# Patient Record
Sex: MALE | Race: WHITE | NOT HISPANIC OR LATINO | Employment: OTHER | URBAN - METROPOLITAN AREA
[De-identification: names, ages, dates, MRNs, and addresses within clinical notes are randomized per-mention and may not be internally consistent; named-entity substitution may affect disease eponyms.]

---

## 2017-01-31 ENCOUNTER — GENERIC CONVERSION - ENCOUNTER (OUTPATIENT)
Dept: OTHER | Facility: OTHER | Age: 72
End: 2017-01-31

## 2017-02-13 ENCOUNTER — GENERIC CONVERSION - ENCOUNTER (OUTPATIENT)
Dept: OTHER | Facility: OTHER | Age: 72
End: 2017-02-13

## 2017-05-11 ENCOUNTER — ALLSCRIPTS OFFICE VISIT (OUTPATIENT)
Dept: OTHER | Facility: OTHER | Age: 72
End: 2017-05-11

## 2017-06-29 ENCOUNTER — GENERIC CONVERSION - ENCOUNTER (OUTPATIENT)
Dept: OTHER | Facility: OTHER | Age: 72
End: 2017-06-29

## 2017-07-13 ENCOUNTER — GENERIC CONVERSION - ENCOUNTER (OUTPATIENT)
Dept: OTHER | Facility: OTHER | Age: 72
End: 2017-07-13

## 2017-09-13 ENCOUNTER — GENERIC CONVERSION - ENCOUNTER (OUTPATIENT)
Dept: OTHER | Facility: OTHER | Age: 72
End: 2017-09-13

## 2017-10-23 DIAGNOSIS — J45.909 UNCOMPLICATED ASTHMA: ICD-10-CM

## 2017-10-23 DIAGNOSIS — E78.2 MIXED HYPERLIPIDEMIA: ICD-10-CM

## 2017-10-23 DIAGNOSIS — Z00.00 ENCOUNTER FOR GENERAL ADULT MEDICAL EXAMINATION WITHOUT ABNORMAL FINDINGS: ICD-10-CM

## 2017-10-23 DIAGNOSIS — G47.33 OBSTRUCTIVE SLEEP APNEA: ICD-10-CM

## 2017-10-23 DIAGNOSIS — E11.9 TYPE 2 DIABETES MELLITUS WITHOUT COMPLICATIONS (HCC): ICD-10-CM

## 2017-10-23 DIAGNOSIS — K21.9 GASTRO-ESOPHAGEAL REFLUX DISEASE WITHOUT ESOPHAGITIS: ICD-10-CM

## 2017-10-23 DIAGNOSIS — I73.9 PERIPHERAL VASCULAR DISEASE (HCC): ICD-10-CM

## 2017-10-23 DIAGNOSIS — K76.0 FATTY (CHANGE OF) LIVER, NOT ELSEWHERE CLASSIFIED: ICD-10-CM

## 2017-10-23 DIAGNOSIS — I10 ESSENTIAL (PRIMARY) HYPERTENSION: ICD-10-CM

## 2017-10-23 DIAGNOSIS — H26.9 CATARACT: ICD-10-CM

## 2017-10-24 ENCOUNTER — ALLSCRIPTS OFFICE VISIT (OUTPATIENT)
Dept: OTHER | Facility: OTHER | Age: 72
End: 2017-10-24

## 2017-10-25 NOTE — PROGRESS NOTES
Assessment  1  Preoperative clearance (V72 84) (Z01 818)   2  Cataract, left eye (366 9) (H26 9)   3  Diabetes mellitus, type 2 (250 00) (E11 9)   4  Hypertension (401 9) (I10)   5  Obstructive sleep apnea (327 23) (G47 33)   6  Peripheral arterial disease (443 9) (I73 9)   7  Asthma (493 90) (J45 909)   8  Fatty liver (571 8) (K76 0)   9  Gastroesophageal reflux disease (530 81) (K21 9)    Plan  Asthma, Cataract, left eye, Diabetes mellitus, type 2, Fatty liver, Gastroesophageal refluxdisease, Hypertension, Obstructive sleep apnea, Peripheral arterial disease    · (1) CBC/ PLT (NO DIFF); Status:Active; Requested YXN:86GIU8574;    · (1) COMPREHENSIVE METABOLIC PANEL; Status:Active; Requested ZCD:68ZWK3259;    · (1) HEMOGLOBIN A1C; Status:Active; Requested IYV:55LHT7074;    · (1) LIPID PANEL, FASTING; Status:Active; Requested TTZ:18UVV2402;    · (1) TSH; Status:Active; Requested PIM:89ASI9020;   Diabetes mellitus, type 2    · Eat a normal well-balanced diet ; Status:Complete;   Done: 55BWY8754 11:26AM   · Call [phone] if: Your blood sugar is higher than 250 ; Status:Complete;   Done:24Oct2017 11:26AM   · Seek Immediate Medical Attention if: There are signs that the blood sugar is too low(hypoglycemia) ; Status:Complete;   Done: 95AWX3042 11:26AM  Hypertension    · Eat no more than 30 grams of fat per day ; Status:Complete;   Done: 43BNU9586 11:26AM   · We encourage you to begin to make lifestyle changes to help control your bloodpressure    These may include losing weight, increasing your activity level, limiting salt inyour diet, decreasing alcohol intake, and eating a diet low in fat and rich in fruitsand vegetables ; Status:Complete;   Done: 98EUH1625 11:26AM  Need for prophylactic vaccination and inoculation against influenza    · Fluzone High-Dose 0 5 ML Intramuscular Suspension Prefilled Syringe    Discussion/Summary  Surgical Clearance: He is at a LOW risk from a cardiovascular standpoint at this time without any additional cardiac testing  Reevaluation needed, if he should present with symptoms prior to surgery/procedure  Surgical clearance faxed to Dr Ceci Castellanos   Hold metformin on the day of surgery  Patient instructed to take his blood pressure medications on the morning of surgery with sips of water  HD flu vaccine today  repeat labs 11/2017  OV 6 months  History of Present Illness  Pre-Op Visit (Brief): The patient is being seen for a preoperative visit  The procedure is a(n) cataract surgery OS scheduled for 10/31/2017 with Dr Arin Chanel  The indication for surgery is cataract OS  Surgical Risk Assessment:    HPI: 81year old male here for pre operative evaluation  cataract surgery OS  medications reviewed  05/2017 see note  history of resistant hypertension blood pressures stable on current multi drug regimen  creatinine 1 04  electrolytes normal  mixed hyperlipidemia on 2 fish oil capsules a day  lipid profile cholesterol 145  TGs 308 increased from 258  TGs 501 in 2015  HDL 30  LDL 53  type 2 DM on Metformin 500 mg BID  A1c 7 0 urine microalbumin 5 4 decreased from 45 on ARB  no hypoglycemic events  mild neuropathy symptoms numbness no pain  last eye exam > 1 year ago  admission 08/2015 for right-sided chest pain associated with increased shortness of breath and accelerated hypertension  He ruled out for MI  EKG no acute EKG changes  nuclear stress test normal  no perfusion abnormalities  EF 67%  He was seen by nephrology  workup for secondary causes of hypertension metanephrine normal at 47  normetanephrine elevated at 183  renin 19 27  aldosterone 2 2 renal artery dopplers 03/2015 no ISIAH  kidneys hypertrophic  2 8 cm cyst left kidney  Review of Systems   Constitutional: no recent weight gain-- and-- no recent weight loss  Eyes: no eyesight problems  ENT: + nasal congestion  , but-- no nasal discharge  Cardiovascular: intermittent leg claudication-- and-- PAD no rest pain   pain left hip and calf pain after walking long distances  11/2016 arterial dopplers LEs  Diffuse heterogeneous plaque throughout both legs  Evidence of right lower extremity occlusive disease in mid SFA  aortoiliac study normal but limited views of proximal common iliac arteries due to body habitus, but-- no chest pain,-- no palpitations-- and-- no extremity edema  Respiratory: asthma stable on Advair  FIOR on CPAP with auto adjustment feature  , but-- no cough,-- no orthopnea,-- no wheezing,-- no shortness of breath during exertion-- and-- no PND  Gastrointestinal: GERD stable on Omeprazole  no dysphagia  EGD 09/2015  colonoscopy 06/2014  1 colon polyp descending colon, diverticulosis and internal hemorrhoids  history of fatty liver with mildly elevated AST and ALT  05/2017 LFTs normal  05/2016 AST 46  ALT 77 , but-- no abdominal pain,-- no nausea,-- no vomiting,-- no constipation,-- no diarrhea-- and-- no blood in stools  Genitourinary: 05/2017 PSA 1 06, but-- no dysuria,-- no urinary hesitancy,-- no incontinence-- and-- no nocturia  Musculoskeletal: no arthralgias-- and-- no myalgias  Integumentary: s/p removal BCC left forehead  Neurological: no headache-- and-- no dizziness  Active Problems  1  Asthma (493 90) (J45 909)   2  Benign positional vertigo (386 11) (H81 10)   3  Degenerative joint disease of hand (715 94) (M19 049)   4  Diabetes mellitus, type 2 (250 00) (E11 9)   5  Eczema (692 9) (L30 9)   6  Fatty liver (571 8) (K76 0)   7  Gastroesophageal reflux disease (530 81) (K21 9)   8  Hypertension (401 9) (I10)   9  Lower back pain (724 2) (M54 5)   10  Lumbar radiculopathy (724 4) (M54 16)   11  Mixed hyperlipidemia (272 2) (E78 2)   12  Obstructive sleep apnea (327 23) (G47 33)   13   Peripheral arterial disease (443 9) (I73 9)    Past Medical History   · History of Accelerated essential hypertension (401 0) (I10)   · History of Diverticulosis (562 10) (K57 90)   · History of chest pain (V13 89) (C51 963)   · History of prostatitis (V13 89) (L15 669)    Surgical History   · History of Cardiovascular Stress Test    Family History  Father    · Family history of Emphysema lung   · Family history of Stroke    Social History   · Former smoker (E10 53) (Z43 834)    Current Meds   1  Advair Diskus 250-50 MCG/DOSE Inhalation Aerosol Powder Breath Activated; USE 1 INHALATION ORALLY    TWICE DAILY; Therapy: 63ZCD5117 to (Evaluate:77Opo4051)  Requested for: 57YRS4990; Last Rx:14Feb2017 Ordered   2  AmLODIPine Besylate 10 MG Oral Tablet; TAKE 1 TABLET DAILY  Requested for: 45Dps2474; Last Rx:14Aug2017 Ordered   3  Aspirin 81 MG TABS; Take 1 tablet daily Recorded   4  Carvedilol 25 MG Oral Tablet; TAKE 1 TABLET TWICE DAILY  Requested for: 01Aug2017; Last Rx:01Aug2017 Ordered   5  Chlorthalidone 25 MG Oral Tablet; TAKE 1 AND 1/2 TABLETS DAILY; Therapy: 11UFY6342 to (Last Rx:85Grz3278)  Requested for: 50LCA6989 Ordered   6  Fish Oil 1000 MG Oral Capsule; TAKE 1 CAPSULE Daily Recorded   7  Folic Acid CAPS; TAKE 1 CAPSULE Daily Recorded   8  Hydrocortisone-Acetic Acid 1-2 % Otic Solution; INSTILL 3 DROPS IN BOTH 4 TIMES DAILY AS NEED; Therapy: 06GNM7080 to (Last Rx:16Jan2017)  Requested for: 02RQK6332 Ordered   9  MetFORMIN HCl - 500 MG Oral Tablet; one tablet twice a day; Therapy: 97CLP1832 to (Evaluate:53Tol6693)  Requested for: 27Ike6609; Last Rx:05Gnw1648 Ordered   10  Omeprazole 40 MG Oral Capsule Delayed Release; Take 1 capsule twice daily  Recorded   11  Systane Ultra SOLN;  Therapy: (Recorded:62Hyl9386) to Recorded   12  Valsartan 320 MG Oral Tablet; TAKE 1 TABLET ONCE DAILY; Therapy: 70Mwi4566 to (Evaluate:68Kbp0323)  Requested for: 01Aug2017; Last  Rx:01Aug2017 Ordered   13  Vitamin B-12 1000 MCG Oral Tablet; Take 1 tablet daily as directed Recorded   14  Vitamin D3 1000 UNIT Oral Tablet; Take as directed Recorded    Allergies  1   No Known Drug Allergies    Vitals   Recorded: 44EFZ8997 10:54AM   Temperature 98 F, Tympanic Heart Rate 54   Respiration 16   Systolic 473, LUE, Sitting   Diastolic 62, LUE, Sitting   Height 5 ft 8 in   Weight 239 lb 4 oz   BMI Calculated 36 38   BSA Calculated 2 21     Physical Exam   Constitutional  General appearance: No acute distress, well appearing and well nourished  Eyes  Conjunctiva and lids: No erythema, swelling or discharge  -- fundi not seen  Ears, Nose, Mouth, and Throat  Otoscopic examination: Tympanic membranes translucent with normal light reflex  Canals patent without erythema  Oropharynx: Normal with no erythema, edema, exudate or lesions  Neck  Neck: Supple, symmetric, trachea midline, no masses  Thyroid: Normal, no thyromegaly  Pulmonary  Auscultation of lungs: Clear to auscultation  no rales or crackles were heard bilaterally  no wheezing  no diminished breath sounds  Cardiovascular  Auscultation of heart: Normal rate and rhythm, normal S1 and S2, no murmurs  Heart sounds: no gallop heard  No pericardial rub  Carotid pulses: 2+ bilaterally  no bruit heard over the right carotid-- and-- no bruit heard over the left carotid  Abdominal aorta: Normal   Abdominal aorta: no bruit heard  Examination of extremities for edema and/or varicosities: Normal    Chest  Chest: Normal   Chest: no tenderness  Abdomen  Abdomen: Non-tender, no masses  Liver and spleen: No hepatomegaly or splenomegaly  Lymphatic  Palpation of lymph nodes in neck: No lymphadenopathy  Musculoskeletal  Inspection/palpation of digits and nails: Normal without clubbing or cyanosis  Skin  Skin and subcutaneous tissue: Normal without rashes or lesions  Psychiatric  Mood and affect: Normal        Results/Data  (1) LYME ANTIBODY PROFILE W/REFLEX TO WESTERN BLOT 22ORM3366 12:00AM Mohinder Aguilar     Test Name Result Flag Reference   LYME IGG 0 38     LYME IGM 0 30       Summary / No summary entered :    No summary entered  Documents attached :    [address] Work - Mohinder Aguilar;  Enc: [id number] - Image Encounter - Mevelyn Faden -    (Family Medicine) (Additional Information Document)  (1) HEMOGLOBIN A1C 53LHO6376 12:00AM Cabreraelyn Faden     Test Name Result Flag Reference   HEMOGLOBIN A1C 7 0     EST  AVG  GLUCOSE 154       Summary / No summary entered :    No summary entered  Documents attached :    [address] Work - Margarita Bosch; Enc: [id number] - Image Encounter - Margarita Roween -    (Family Medicine) (Additional Information Document)  (1) CBC/PLT/DIFF 20NDO0187 12:00AM Mevelyn Faden     Test Name Result Flag Reference   WBC COUNT 7 6     RBC COUNT 5 06     HEMOGLOBIN 14 0     HEMATOCRIT 41 8     MCV 83     MCH 27 7     MCHC 33 6     RDW 13 9     MPV 10 6     PLATELET COUNT 619     NEUTROPHILS RELATIVE PERCENT 54     LYMPHOCYTES RELATIVE PERCENT 34     MONOCYTES RELATIVE PERCENT 5     EOSINOPHILS RELATIVE PERCENT 6     BASOPHILS RELATIVE PERCENT 1     NEUTROPHILS ABSOLUTE COUNT 4 1     LYMPHOCYTES ABSOLUTE COUNT 2 6     MONOCYTES ABSOLUTE COUNT 0 4     EOSINOPHILS ABSOLUTE COUNT 0 4     BASOPHILS ABSOLUTE COUNT 0 1       Summary / No summary entered :    No summary entered  Documents attached :    [address] Work - Margarita Bosch; Enc: [id number] - Image Encounter - Mevelyn Faden -    (Family Medicine) (Additional Information Document)  (1) COMPREHENSIVE METABOLIC PANEL 78RGW8677 50:12YU Cabreraelyn Jaeen     Test Name Result Flag Reference   SODIUM 141     POTASSIUM 4 4     CHLORIDE 101     CARBON DIOXIDE 29     ANION GAP (CALC) 11     BLOOD UREA NITROGEN 22     CREATININE 1 04     CALCIUM 9 5     BILI, TOTAL 0 4     ALK PHOSPHATAS 62     AST(SGOT) 36     ALBUMIN 3 9     TOTAL PROTEIN 7 2     GLUCOSE FASTING 159       Summary / No summary entered :    No summary entered  Documents attached :    [address] Work - Margarita Bosch;  Enc: [id number] - Image Encounter - Mevelyn Jaeen -    (Family Medicine) (Additional Information Document)  (1) LIPID PANEL, FASTING 47EAP7427 12:00AM Mevelyn Faden     Test Name Result Flag Reference CHOLESTEROL 145     HDL,DIRECT 30     LDL CHOLESTEROL CALCULATED 53     TRIGLYCERIDES 308     TCHOL/HDL RATIO 4 83       Summary / No summary entered :    No summary entered  Documents attached :    [address] Work - Donte Stewart; Enc: [id number] - Image Encounter - Donte Jeronimof -    (Family Medicine) (Additional Information Document)  (1) MICROALBUMIN CREATININE RATIO, RANDOM URINE 61EIU9927 12:00AM Donte Stewart     Test Name Result Flag Reference   MICROALBUMIN,URINE 5 4     CREATININE URINE 108 0     MICROALBUMIN/ CREAT RATIO 50       Summary / No summary entered :    No summary entered  Documents attached :    [address] Work - Donte Stewart; Enc: [id number] - Image Encounter - Donte Stewart -    [facility]) (Additional Information Document)  (1) PSA (SCREEN) (Dx V76 44 Screen for Prostate Cancer) 76GHC7516 12:00AM Donte Stewart     Test Name Result Flag Reference   PROSTATE SPECIFIC ANTIGEN 1 06       Summary / No summary entered :    No summary entered  Documents attached :    [address] Work - Donte Stewart; Enc: [id number] - Image Encounter - Donte Jeronimof -    (Family Medicine) (Additional Information Document)  VAS ABDOMINAL AORTA/ILIACS; COMPLETE STUDY 97TZT2960 08:35AM Tanya Tellez Order Number: [id number]   - Patient Instructions: To schedule this appointment, please contact Central Scheduling at [phone]  Test Name Result Flag Reference   VAS ABDOMINAL AORTA/ILIACS; COMPLETE STUDY (Report)     [facility] REPORT  CLINICAL:  Indications: Claudication [I70 219]  Indications: PVD, Unspecified [I73 9]  Pt exp pain left side of abdomen  (like a stitch) which radiated across groin to medial side of left leg while  walking  Symptoms began 2000 but subsided when he stopped smoking  However,  symptoms returned a few years ago and he is now exp  the same discomfort in the  right leg  Risk Factors: The patient has history of COPD and Diabetes (NIDDM (oral  meds))   The patient current BMI is 33, Height (in) is 70 in and Weight (lb) is  230 lb  Small hiatal hernia  Sleep apnea  Clinical  Right Pressure: 155/ mm Hg, Left Pressure: 157/ mm Hg  FINDINGS:    Unilateral    Impression   PSV (cm/s) EDV (cm/s) AP (cm) TRV (cm)   Sup-Amy Ao                95     15   1 5    1 7   Jux Renal Aorta             101      0   1 5    2 0   Ds Inf-Mason Ao              208      0   1 5        Celiac      Not visualized                         SMA Ostial    Not visualized                         Prox  SMA    Not visualized                           Right      Impression   PSV (cm/s) EDV (cm/s) AP (cm)   Prox CHRISTINA                177      0        Dist CHRISTNIA                198      0        Internal Iliac             198      0   0 6   Prox  EIA               217      0   0 5   Dist EIA                148      0   0 6   Prox Renal   Not Visualized                      Left      Impression   PSV (cm/s) EDV (cm/s) AP (cm)   Prox CHRISTINA                182      0   0 9   Dist CHRISTINA                          0 7   Internal Iliac             167      0   0 7   Prox  EIA               148      0   0 6   Dist EIA                106         0 8   Prox Renal   Not Visualized                          CONCLUSION:  Impression  Segments of the aorta and iliac arteries that were visualized appear to be of  normal caliber and patent  The proximal common Iliac arteries were not visualized due to patient body  habitus, over-lying bowel gas, and large abdominal hernia  SIGNATURE:  Electronically Signed by: Leticia Parra MD, RPVI on 2016-12-01 05:36:56 PM     VAS LOWER LIMB ARTERIAL DUPLEX, COMPLETE BILATERAL/GRAFTS [id number] 08:35AM [facility] Order Number: [id number]   - Patient Instructions: To schedule this appointment, please contact Central Scheduling at [phone]       Test Name Result Flag Reference   VAS LOWER LIMB ARTERIAL DUPLEX, COMPLETE BILATERAL/GRAFTS (Report)     [facility] REPORT  CLINICAL:  Indications: PVD, Unspecified [I73 9]  Pt exp pain left side of abdomen  (like a stitch) which radiated across groin to medial side of left leg while  walking  Symptoms began 2000 but subsided when he stopped smoking  However  returned a few years ago and he is now exp  the same discomfort in the right  leg  Risk Factors: The patient has history of COPD and Diabetes (NIDDM (oral meds)  The patient current BMI is 33, Height (in) is 70 in and Weight (lb) is 230 lb  Small hiatal hernia  Sleep apnea    Right Brachial Pressure: 155/ mmHg, Left Brachial Pressure: 157/ mmHg  FINDINGS:    Segment        Right         Left                       Impression PSV EDV PSV EDV   Common Femoral Artery       177  0 203  0   Prox Profunda           212  0 171  0   Prox SFA              147  0 122  0   Mid SFA        <50%    187  0  95  0   Dist SFA              123  0  73  0   Proximal Pop            78  0  71  0   Distal Pop             87  0  64  0   Dist Post Tibial          81  5  76  0   Dist  Ant  Tibial          79  0  39  0         CONCLUSION:  Impression:  RIGHT LOWER LIMB:  This resting evaluation shows evidence of lower extremity arterial occlusive  disease in the mid SFA  There is diffuse heterogeneous plaque throughout the  lower extremity  Ankle/Brachial index:  1 15, normal  Metatarsal pressure 173mmHg  Great toe pressure of  50 mmHg, within the healing range    LEFT LOWER LIMB:  This resting evaluation shows no evidence of significant lower extremity  arterial occlusive disease  There is diffuse heterogeneous plaque throughout the  lower extremity  Ankle/Brachial index:  1 07, normal  Metatarsal pressure 133mmHg  Great toe pressure of  79 mmHg, within the healing range    There is no previous studies      SIGNATURE:  Electronically Signed by: Kathy Jain MD, RPVI on 2016-12-01 05:37:15 PM     Education  Education Items with no Session   Fluzone High-Dose 0 5 ML Intramuscular Suspension Prefilled Syringe; QRHJPHWM:25TXL8847 11:20AM; Counselor: Ronnie Penny; End of Encounter Meds  1  Advair Diskus 250-50 MCG/DOSE Inhalation Aerosol Powder Breath Activated; USE 1 INHALATION ORALLY    TWICE DAILY; Therapy: 41LOT4778 to (Evaluate:64Zjl7828)  Requested for: 95LNY6006; Last Rx:17Mpx4026 Ordered  2  Vitamin D3 1000 UNIT Oral Tablet; Take as directed Recorded  3  MetFORMIN HCl - 500 MG Oral Tablet; one tablet twice a day; Therapy: 19JZV6171 to (Evaluate:50Mxa3364)  Requested for: 85Rsk8297; Last Rx:62Fvg2255 Ordered  4  Hydrocortisone-Acetic Acid 1-2 % Otic Solution; INSTILL 3 DROPS IN BOTH 4 TIMES DAILY AS NEED; Therapy: 88VQE6181 to (Last Rx:16Jan2017)  Requested for: 37VTE8451 Ordered  5  Omeprazole 40 MG Oral Capsule Delayed Release; Take 1 capsule twice daily Recorded  6  Aspirin 81 MG TABS; Take 1 tablet daily Recorded   7  Fish Oil 1000 MG Oral Capsule; TAKE 1 CAPSULE Daily Recorded   8  Folic Acid CAPS; TAKE 1 CAPSULE Daily Recorded   9  Vitamin B-12 1000 MCG Oral Tablet; Take 1 tablet daily as directed Recorded  10  AmLODIPine Besylate 10 MG Oral Tablet; TAKE 1 TABLET DAILY  Requested for:  05Wqi1118; Last Rx:23Qup1741 Ordered   11  Carvedilol 25 MG Oral Tablet; TAKE 1 TABLET TWICE DAILY  Requested for:  38Pfj8939; Last Rx:38Cwr0516 Ordered   12  Chlorthalidone 25 MG Oral Tablet; TAKE 1 AND 1/2 TABLETS DAILY; Therapy: 05ZMY6148 to (Last Rx:25Asq3910)  Requested for: 81BNK5970 Ordered   13  Valsartan 320 MG Oral Tablet; TAKE 1 TABLET ONCE DAILY; Therapy: 94Cid7959 to (Evaluate:72Fhb7075)  Requested for: 80Jgc0597; Last  Rx:64Weg0649 Ordered  14  Systane Ultra SOLN;  Therapy: (Recorded:58Yok9181) to Recorded    Future Appointments    Date/Time Provider Specialty Site   04/30/2018 08:00 AM VARGAS Benedict   Family Medicine [address]     Signatures   Electronically signed by : VARGAS Park ; Oct 24 2017 11:29AM EST                       (Author)

## 2018-01-10 DIAGNOSIS — Z12.5 ENCOUNTER FOR SCREENING FOR MALIGNANT NEOPLASM OF PROSTATE: ICD-10-CM

## 2018-01-10 DIAGNOSIS — E11.9 TYPE 2 DIABETES MELLITUS WITHOUT COMPLICATIONS (HCC): ICD-10-CM

## 2018-01-10 DIAGNOSIS — K76.0 FATTY (CHANGE OF) LIVER, NOT ELSEWHERE CLASSIFIED: ICD-10-CM

## 2018-01-10 DIAGNOSIS — I10 ESSENTIAL (PRIMARY) HYPERTENSION: ICD-10-CM

## 2018-01-10 DIAGNOSIS — Z00.00 ENCOUNTER FOR GENERAL ADULT MEDICAL EXAMINATION WITHOUT ABNORMAL FINDINGS: ICD-10-CM

## 2018-01-10 DIAGNOSIS — G47.33 OBSTRUCTIVE SLEEP APNEA: ICD-10-CM

## 2018-01-10 DIAGNOSIS — K21.9 GASTRO-ESOPHAGEAL REFLUX DISEASE WITHOUT ESOPHAGITIS: ICD-10-CM

## 2018-01-10 DIAGNOSIS — I73.9 PERIPHERAL VASCULAR DISEASE (HCC): ICD-10-CM

## 2018-01-10 DIAGNOSIS — E78.2 MIXED HYPERLIPIDEMIA: ICD-10-CM

## 2018-01-12 VITALS
SYSTOLIC BLOOD PRESSURE: 134 MMHG | DIASTOLIC BLOOD PRESSURE: 62 MMHG | HEIGHT: 68 IN | RESPIRATION RATE: 16 BRPM | TEMPERATURE: 96.6 F | HEART RATE: 68 BPM | WEIGHT: 237 LBS | BODY MASS INDEX: 35.92 KG/M2

## 2018-01-13 VITALS
RESPIRATION RATE: 16 BRPM | HEIGHT: 68 IN | DIASTOLIC BLOOD PRESSURE: 62 MMHG | WEIGHT: 239.25 LBS | BODY MASS INDEX: 36.26 KG/M2 | HEART RATE: 54 BPM | SYSTOLIC BLOOD PRESSURE: 136 MMHG | TEMPERATURE: 98 F

## 2018-01-18 NOTE — CONSULTS
History of Present Illness  Pre-Op Visit (Brief): The patient is being seen for a preoperative visit  The procedure is a(n) cataract surgery OS scheduled for 10/31/2017 with Dr Gianluca Pozo  The indication for surgery is cataract OS  Surgical Risk Assessment:       HPI: 81year old male here for pre operative evaluation  cataract surgery OS  medications reviewed  05/2017 see note  history of resistant hypertension blood pressures stable on current multi drug regimen  creatinine 1 04  electrolytes normal  mixed hyperlipidemia on 2 fish oil capsules a day  lipid profile cholesterol 145  TGs 308 increased from 258  TGs 501 in 2015  HDL 30  LDL 53  type 2 DM on Metformin 500 mg BID  A1c 7 0 urine microalbumin 5 4 decreased from 45 on ARB  no hypoglycemic events  mild neuropathy symptoms numbness no pain  last eye exam > 1 year ago  admission 08/2015 for right-sided chest pain associated with increased shortness of breath and accelerated hypertension  He ruled out for MI  EKG no acute EKG changes  nuclear stress test normal  no perfusion abnormalities  EF 67%  He was seen by nephrology  workup for secondary causes of hypertension metanephrine normal at 47  normetanephrine elevated at 183  renin 19 27  aldosterone 2 2 renal artery dopplers 03/2015 no ISIAH  kidneys hypertrophic  2 8 cm cyst left kidney  Review of Systems    Constitutional: no recent weight gain and no recent weight loss  Eyes: no eyesight problems  ENT: + nasal congestion  , but no nasal discharge  Cardiovascular: intermittent leg claudication and PAD no rest pain  pain left hip and calf pain after walking long distances  11/2016 arterial dopplers LEs  Diffuse heterogeneous plaque throughout both legs  Evidence of right lower extremity occlusive disease in mid SFA  aortoiliac study normal but limited views of proximal common iliac arteries due to body habitus, but no chest pain, no palpitations and no extremity edema     Respiratory: asthma stable on Advair  FIOR on CPAP with auto adjustment feature  , but no cough, no orthopnea, no wheezing, no shortness of breath during exertion and no PND  Gastrointestinal: GERD stable on Omeprazole  no dysphagia  EGD 09/2015  colonoscopy 06/2014  1 colon polyp descending colon, diverticulosis and internal hemorrhoids  history of fatty liver with mildly elevated AST and ALT  05/2017 LFTs normal  05/2016 AST 46  ALT 77 , but no abdominal pain, no nausea, no vomiting, no constipation, no diarrhea and no blood in stools  Genitourinary: 05/2017 PSA 1 06, but no dysuria, no urinary hesitancy, no incontinence and no nocturia  Musculoskeletal: no arthralgias and no myalgias  Integumentary: s/p removal BCC left forehead  Neurological: no headache and no dizziness  Active Problems    1  Asthma (493 90) (J45 909)   2  Benign positional vertigo (386 11) (H81 10)   3  Degenerative joint disease of hand (715 94) (M19 049)   4  Diabetes mellitus, type 2 (250 00) (E11 9)   5  Eczema (692 9) (L30 9)   6  Fatty liver (571 8) (K76 0)   7  Gastroesophageal reflux disease (530 81) (K21 9)   8  Hypertension (401 9) (I10)   9  Lower back pain (724 2) (M54 5)   10  Lumbar radiculopathy (724 4) (M54 16)   11  Mixed hyperlipidemia (272 2) (E78 2)   12  Obstructive sleep apnea (327 23) (G47 33)   13  Peripheral arterial disease (443 9) (I73 9)    Past Medical History    · History of Accelerated essential hypertension (401 0) (I10)   · History of Diverticulosis (562 10) (K57 90)   · History of chest pain (V13 89) (G92 555)   · History of prostatitis (V13 89) (R24 554)    Surgical History    · History of Cardiovascular Stress Test    Family History    · Family history of Emphysema lung   · Family history of Stroke    Social History    · Former smoker (Q55 17) (P37 377)    Current Meds   1  Advair Diskus 250-50 MCG/DOSE Inhalation Aerosol Powder Breath Activated; USE 1   INHALATION ORALLY    TWICE DAILY;    Therapy: 03IBA7072 to (22 016140)  Requested for: 24XQI8474; Last   Rx:92Wlu5298 Ordered   2  AmLODIPine Besylate 10 MG Oral Tablet; TAKE 1 TABLET DAILY  Requested for:   54Yzv6282; Last Rx:75Pxx7029 Ordered   3  Aspirin 81 MG TABS; Take 1 tablet daily Recorded   4  Carvedilol 25 MG Oral Tablet; TAKE 1 TABLET TWICE DAILY  Requested for: 84Fkp5901;   Last Rx:01Aug2017 Ordered   5  Chlorthalidone 25 MG Oral Tablet; TAKE 1 AND 1/2 TABLETS DAILY; Therapy: 07GOZ0199 to (Last Rx:34Cmq0368)  Requested for: 72HBS3511 Ordered   6  Fish Oil 1000 MG Oral Capsule; TAKE 1 CAPSULE Daily Recorded   7  Folic Acid CAPS; TAKE 1 CAPSULE Daily Recorded   8  Hydrocortisone-Acetic Acid 1-2 % Otic Solution; INSTILL 3 DROPS IN BOTH 4 TIMES   DAILY AS NEED; Therapy: 16TQW7660 to (Last Rx:16Jan2017)  Requested for: 85JRV5130 Ordered   9  MetFORMIN HCl - 500 MG Oral Tablet; one tablet twice a day; Therapy: 13ZPT2252 to (Evaluate:57Aww7300)  Requested for: 10Akg2863; Last   Rx:61Vjc0407 Ordered   10  Omeprazole 40 MG Oral Capsule Delayed Release; Take 1 capsule twice daily    Recorded   11  Systane Ultra SOLN;    Therapy: (Recorded:10Aug2015) to Recorded   12  Valsartan 320 MG Oral Tablet; TAKE 1 TABLET ONCE DAILY; Therapy: 51Gtk8424 to (Evaluate:54Bvn1917)  Requested for: 09Eos4553; Last    Rx:01Aug2017 Ordered   13  Vitamin B-12 1000 MCG Oral Tablet; Take 1 tablet daily as directed Recorded   14  Vitamin D3 1000 UNIT Oral Tablet; Take as directed Recorded    Allergies    1  No Known Drug Allergies    Vitals  Signs    Temperature: 98 F, Tympanic  Heart Rate: 54  Respiration: 16  Systolic: 407, LUE, Sitting  Diastolic: 62, LUE, Sitting  Height: 5 ft 8 in  Weight: 239 lb 4 oz  BMI Calculated: 36 38  BSA Calculated: 2 21    Physical Exam    Constitutional   General appearance: No acute distress, well appearing and well nourished  Eyes   Conjunctiva and lids: No erythema, swelling or discharge  fundi not seen     Ears, Nose, Mouth, and Throat Otoscopic examination: Tympanic membranes translucent with normal light reflex  Canals patent without erythema  Oropharynx: Normal with no erythema, edema, exudate or lesions  Neck   Neck: Supple, symmetric, trachea midline, no masses  Thyroid: Normal, no thyromegaly  Pulmonary   Auscultation of lungs: Clear to auscultation  no rales or crackles were heard bilaterally  no wheezing  no diminished breath sounds  Cardiovascular   Auscultation of heart: Normal rate and rhythm, normal S1 and S2, no murmurs  Heart sounds: no gallop heard  No pericardial rub  Carotid pulses: 2+ bilaterally  no bruit heard over the right carotid and no bruit heard over the left carotid  Abdominal aorta: Normal   Abdominal aorta: no bruit heard  Examination of extremities for edema and/or varicosities: Normal     Chest   Chest: Normal   Chest: no tenderness  Abdomen   Abdomen: Non-tender, no masses  Liver and spleen: No hepatomegaly or splenomegaly  Lymphatic   Palpation of lymph nodes in neck: No lymphadenopathy  Musculoskeletal   Inspection/palpation of digits and nails: Normal without clubbing or cyanosis  Skin   Skin and subcutaneous tissue: Normal without rashes or lesions  Psychiatric   Mood and affect: Normal        Results/Data  (1) LYME ANTIBODY PROFILE W/REFLEX TO WESTERN BLOT 49BJH8095 12:00AM [facility]     Test Name Result Flag Reference   LYME IGG 0 38     LYME IGM 0 30       Summary / No summary entered :      No summary entered  Documents attached :      [address] Work - [employer]; Enc: [id number] - Image Encounter - Jeison Hernandez -      (Family Medicine) (Additional Information Document)  (1) HEMOGLOBIN A1C 92VZS2832 12:00AM [facility]     Test Name Result Flag Reference   HEMOGLOBIN A1C 7 0     EST  AVG  GLUCOSE 154       Summary / No summary entered :      No summary entered  Documents attached :      [address] Work - [employer];  Enc: [id number] - Image Encounter - [place] [facility], Tila Jerome -      (Family Medicine) (Additional Information Document)  (1) CBC/PLT/DIFF 04KJY6061 12:00AM [facility]     Test Name Result Flag Reference   WBC COUNT 7 6     RBC COUNT 5 06     HEMOGLOBIN 14 0     HEMATOCRIT 41 8     MCV 83     MCH 27 7     MCHC 33 6     RDW 13 9     MPV 10 6     PLATELET COUNT 214     NEUTROPHILS RELATIVE PERCENT 54     LYMPHOCYTES RELATIVE PERCENT 34     MONOCYTES RELATIVE PERCENT 5     EOSINOPHILS RELATIVE PERCENT 6     BASOPHILS RELATIVE PERCENT 1     NEUTROPHILS ABSOLUTE COUNT 4 1     LYMPHOCYTES ABSOLUTE COUNT 2 6     MONOCYTES ABSOLUTE COUNT 0 4     EOSINOPHILS ABSOLUTE COUNT 0 4     BASOPHILS ABSOLUTE COUNT 0 1       Summary / No summary entered :      No summary entered  Documents attached :      [address] Work - [facility]; Enc: [id number] - Image Encounter - [facility] -      (Family Medicine) (Additional Information Document)  (1) COMPREHENSIVE METABOLIC PANEL 53RPN3101 07:47XH [facility]     Test Name Result Flag Reference   SODIUM 141     POTASSIUM 4 4     CHLORIDE 101     CARBON DIOXIDE 29     ANION GAP (CALC) 11     BLOOD UREA NITROGEN 22     CREATININE 1 04     CALCIUM 9 5     BILI, TOTAL 0 4     ALK PHOSPHATAS 62     AST(SGOT) 36     ALBUMIN 3 9     TOTAL PROTEIN 7 2     GLUCOSE FASTING 159       Summary / No summary entered :      No summary entered  Documents attached :      [address] Work - [facility]; Enc: [id number] - Image Encounter - [facility] -      (Family Medicine) (Additional Information Document)  (1) LIPID PANEL, FASTING 15TUB9476 12:00AM [facility]     Test Name Result Flag Reference   CHOLESTEROL 145     HDL,DIRECT 30     LDL CHOLESTEROL CALCULATED 53     TRIGLYCERIDES 308     TCHOL/HDL RATIO 4 83       Summary / No summary entered :      No summary entered  Documents attached :      [address] Work - [facility];  Enc: [id number] - Image Encounter - [facility] -      [facility]) (Additional Information Document)  (1) MICROALBUMIN CREATININE RATIO, RANDOM URINE 22PBY9003 12:00AM [facility]     Test Name Result Flag Reference   MICROALBUMIN,URINE 5 4     CREATININE URINE 108 0     MICROALBUMIN/ CREAT RATIO 50       Summary / No summary entered :      No summary entered  Documents attached :      [address] Work - [facility]; Enc: [id number] - Image Encounter - [facility] -      [facility]) (Additional Information Document)  (1) PSA (SCREEN) (Dx V76 44 Screen for Prostate Cancer) 35PUC3468 12:00AM [facility]     Test Name Result Flag Reference   PROSTATE SPECIFIC ANTIGEN 1 06       Summary / No summary entered :      No summary entered  Documents attached :      [address] Work - [employer]; Enc: [id number] - Image Encounter - [facility] -      (Family Medicine) (Additional Information Document)  VAS ABDOMINAL AORTA/ILIACS; COMPLETE STUDY 22CVV0758 08:35AM [facility] Order Number: [id number]    - Patient Instructions: To schedule this appointment, please contact Central Scheduling at [phone]  Test Name Result Flag Reference   VAS ABDOMINAL AORTA/ILIACS; COMPLETE STUDY (Report)     [facility] REPORT   CLINICAL:   Indications: Claudication [I70 219]  Indications: PVD, Unspecified [I73 9]  Pt exp pain left side of abdomen   (like a stitch) which radiated across groin to medial side of left leg while   walking  Symptoms began 2000 but subsided when he stopped smoking  However,   symptoms returned a few years ago and he is now exp  the same discomfort in the   right leg  Risk Factors: The patient has history of COPD and Diabetes (NIDDM (oral   meds))  The patient current BMI is 33, Height (in) is 70 in and Weight (lb) is   230 lb  Small hiatal hernia  Sleep apnea  Clinical   Right Pressure: 155/ mm Hg, Left Pressure: 157/ mm Hg        FINDINGS:      Unilateral    Impression   PSV (cm/s) EDV (cm/s) AP (cm) TRV (cm)    Sup-Amy Ao                95     15   1 5    1 7    Jux Renal Aorta             101 0   1 5    2 0    Ds Inf-Mason Ao              208      0   1 5         Celiac      Not visualized                          SMA Ostial    Not visualized                          Prox  SMA    Not visualized                             Right      Impression   PSV (cm/s) EDV (cm/s) AP (cm)    Prox CHRISTINA                177      0         Dist CHRISTINA                198      0         Internal Iliac             198      0   0 6    Prox  EIA               217      0   0 5    Dist EIA                148      0   0 6    Prox Renal   Not Visualized                        Left      Impression   PSV (cm/s) EDV (cm/s) AP (cm)    Prox CHRISTINA                182      0   0 9    Dist CHRISTINA                          0 7    Internal Iliac             167      0   0 7    Prox  EIA               148      0   0 6    Dist EIA                106         0 8    Prox Renal   Not Visualized                              CONCLUSION:   Impression   Segments of the aorta and iliac arteries that were visualized appear to be of   normal caliber and patent  The proximal common Iliac arteries were not visualized due to patient body   habitus, over-lying bowel gas, and large abdominal hernia  SIGNATURE:   Electronically Signed by: Nandini Xiong MD, RPVI on 2016-12-01 05:36:56 PM     VAS LOWER LIMB ARTERIAL DUPLEX, COMPLETE BILATERAL/GRAFTS [id number] 08:35AM [facility] Number: [id number]    - Patient Instructions: To schedule this appointment, please contact Central Scheduling at [phone]  Test Name Result Flag Reference   VAS LOWER LIMB ARTERIAL DUPLEX, COMPLETE BILATERAL/GRAFTS (Report)     [facility] REPORT   CLINICAL:   Indications: PVD, Unspecified [I73 9]  Pt exp pain left side of abdomen   (like a stitch) which radiated across groin to medial side of left leg while   walking  Symptoms began 2000 but subsided when he stopped smoking  However   returned a few years ago and he is now exp   the same discomfort in the right   leg  Risk Factors: The patient has history of COPD and Diabetes (NIDDM (oral meds)  The patient current BMI is 33, Height (in) is 70 in and Weight (lb) is 230 lb  Small hiatal hernia  Sleep apnea      Right Brachial Pressure: 155/ mmHg, Left Brachial Pressure: 157/ mmHg  FINDINGS:      Segment        Right         Left                        Impression PSV EDV PSV EDV    Common Femoral Artery       177  0 203  0    Prox Profunda           212  0 171  0    Prox SFA              147  0 122  0    Mid SFA        <50%    187  0  95  0    Dist SFA              123  0  73  0    Proximal Pop            78  0  71  0    Distal Pop             87  0  64  0    Dist Post Tibial          81  5  76  0    Dist  Ant  Tibial          79  0  39  0             CONCLUSION:   Impression:   RIGHT LOWER LIMB:   This resting evaluation shows evidence of lower extremity arterial occlusive   disease in the mid SFA  There is diffuse heterogeneous plaque throughout the   lower extremity  Ankle/Brachial index:  1 15, normal   Metatarsal pressure 173mmHg  Great toe pressure of  50 mmHg, within the healing range      LEFT LOWER LIMB:   This resting evaluation shows no evidence of significant lower extremity   arterial occlusive disease  There is diffuse heterogeneous plaque throughout the   lower extremity  Ankle/Brachial index:  1 07, normal   Metatarsal pressure 133mmHg   Great toe pressure of  79 mmHg, within the healing range      There is no previous studies  SIGNATURE:   Electronically Signed by: Leticia Parra MD, RPVI on 2016-12-01 05:37:15 PM     Assessment    1  Preoperative clearance (V72 84) (Z01 818)   2  Cataract, left eye (366 9) (H26 9)   3  Diabetes mellitus, type 2 (250 00) (E11 9)   4  Hypertension (401 9) (I10)   5  Obstructive sleep apnea (327 23) (G47 33)   6  Peripheral arterial disease (443 9) (I73 9)   7  Asthma (493 90) (J45 909)   8  Fatty liver (571 8) (K76 0)   9   Gastroesophageal reflux disease (530 81) (K21 9)    Plan  Asthma, Cataract, left eye, Diabetes mellitus, type 2, Fatty liver, Gastroesophageal reflux  disease, Hypertension, Obstructive sleep apnea, Peripheral arterial disease    · (1) CBC/ PLT (NO DIFF); Status:Active; Requested GYF:13KYV2643;    · (1) COMPREHENSIVE METABOLIC PANEL; Status:Active; Requested YED:75RWG2516;    · (1) HEMOGLOBIN A1C; Status:Active; Requested AIU:47WYW2268;    · (1) LIPID PANEL, FASTING; Status:Active; Requested AKS:53MPS7836;    · (1) TSH; Status:Active; Requested QQZ:94QKF7571;   Diabetes mellitus, type 2    · Eat a normal well-balanced diet ; Status:Complete;   Done: 64KDO8677 11:26AM   · Call [phone] if: Your blood sugar is higher than 250 ; Status:Complete;   Done:  03YJG7375 11:26AM   · Seek Immediate Medical Attention if: There are signs that the blood sugar is too low  (hypoglycemia) ; Status:Complete;   Done: 74NOX1818 11:26AM  Hypertension    · Eat no more than 30 grams of fat per day ; Status:Complete;   Done: 20PXP0449 11:26AM   · We encourage you to begin to make lifestyle changes to help control your blood  pressure  These may include losing weight, increasing your activity level, limiting salt in  your diet, decreasing alcohol intake, and eating a diet low in fat and rich in fruits  and vegetables ; Status:Complete;   Done: 66YNZ8011 11:26AM  Need for prophylactic vaccination and inoculation against influenza    · Fluzone High-Dose 0 5 ML Intramuscular Suspension Prefilled Syringe    Discussion/Summary  Surgical Clearance: He is at a LOW risk from a cardiovascular standpoint at this time without any additional cardiac testing  Reevaluation needed, if he should present with symptoms prior to surgery/procedure  Surgical clearance faxed to Dr Timothy Zuniga   Hold metformin on the day of surgery  Patient instructed to take his blood pressure medications on the morning of surgery with sips of water  HD flu vaccine today   repeat labs 11/2017  OV 6 months  Education  Education Items with no Session   Fluzone High-Dose 0 5 ML Intramuscular Suspension Prefilled Syringe;  Provided:  05DJE8341 11:20AM; Counselor: Sravan Bowers; End of Encounter Meds    1  Advair Diskus 250-50 MCG/DOSE Inhalation Aerosol Powder Breath Activated; USE 1   INHALATION ORALLY    TWICE DAILY; Therapy: 07MVL4243 to (Evaluate:65Nww5572)  Requested for: 72FCT9338; Last   Rx:27Ruv4959 Ordered    2  Vitamin D3 1000 UNIT Oral Tablet; Take as directed Recorded    3  MetFORMIN HCl - 500 MG Oral Tablet; one tablet twice a day; Therapy: 41MBL8893 to (Evaluate:89Suw5255)  Requested for: 30Vwd3235; Last   Rx:85Mza3789 Ordered    4  Hydrocortisone-Acetic Acid 1-2 % Otic Solution; INSTILL 3 DROPS IN BOTH 4 TIMES   DAILY AS NEED; Therapy: 36AOM5968 to (Last Rx:16Jan2017)  Requested for: 45GAV0254 Ordered    5  Omeprazole 40 MG Oral Capsule Delayed Release; Take 1 capsule twice daily   Recorded    6  Aspirin 81 MG TABS; Take 1 tablet daily Recorded   7  Fish Oil 1000 MG Oral Capsule; TAKE 1 CAPSULE Daily Recorded   8  Folic Acid CAPS; TAKE 1 CAPSULE Daily Recorded   9  Vitamin B-12 1000 MCG Oral Tablet; Take 1 tablet daily as directed Recorded    10  AmLODIPine Besylate 10 MG Oral Tablet; TAKE 1 TABLET DAILY  Requested for:    17Dij8536; Last Rx:81Lqj4193 Ordered   11  Carvedilol 25 MG Oral Tablet; TAKE 1 TABLET TWICE DAILY  Requested for: 04Ijq8263;    Last Rx:93Xfg8829 Ordered   12  Chlorthalidone 25 MG Oral Tablet; TAKE 1 AND 1/2 TABLETS DAILY; Therapy: 05SMT2504 to (Last Rx:99Kjm1081)  Requested for: 82DNW4070 Ordered   13  Valsartan 320 MG Oral Tablet; TAKE 1 TABLET ONCE DAILY; Therapy: 64Zlg3424 to (Evaluate:64Cyn7717)  Requested for: 67Efn6913; Last    Rx:42Ctc0792 Ordered    14   Systane Ultra SOLN;    Therapy: (Recorded:10Aug2015) to Recorded    Signatures   Electronically signed by : VARGAS Sanders ; Oct 24 2017 11:29AM EST (Author)

## 2018-01-25 ENCOUNTER — TRANSCRIBE ORDERS (OUTPATIENT)
Dept: ADMINISTRATIVE | Facility: HOSPITAL | Age: 73
End: 2018-01-25

## 2018-01-25 DIAGNOSIS — R10.9 STOMACH PAIN: Primary | ICD-10-CM

## 2018-01-31 ENCOUNTER — HOSPITAL ENCOUNTER (OUTPATIENT)
Dept: RADIOLOGY | Facility: MEDICAL CENTER | Age: 73
Discharge: HOME | End: 2018-01-31
Payer: COMMERCIAL

## 2018-01-31 DIAGNOSIS — R10.9 STOMACH PAIN: ICD-10-CM

## 2018-01-31 PROCEDURE — 76705 ECHO EXAM OF ABDOMEN: CPT

## 2018-03-09 ENCOUNTER — TELEPHONE (OUTPATIENT)
Dept: FAMILY MEDICINE CLINIC | Facility: CLINIC | Age: 73
End: 2018-03-09

## 2018-03-09 NOTE — TELEPHONE ENCOUNTER
Patient is requesting refill Amlodipine 10 mg 1 pill daily #90 90 day supply with refills   SWATHI Echavarria

## 2018-03-10 DIAGNOSIS — I10 ESSENTIAL HYPERTENSION: Primary | ICD-10-CM

## 2018-03-10 RX ORDER — AMLODIPINE BESYLATE 10 MG/1
10 TABLET ORAL DAILY
Qty: 90 TABLET | Refills: 3 | Status: SHIPPED | OUTPATIENT
Start: 2018-03-10 | End: 2019-02-15 | Stop reason: SDUPTHER

## 2018-03-10 RX ORDER — AMLODIPINE BESYLATE 10 MG/1
1 TABLET ORAL DAILY
COMMUNITY
End: 2018-03-10 | Stop reason: SDUPTHER

## 2018-04-30 LAB
HBA1C MFR BLD HPLC: 6.5 %
MICROALBUM.,U,RANDOM (HISTORICAL): 1.8 MG/L
MICROALBUMIN/CREATININE RATIO (HISTORICAL): 17 MG/G CREATININE

## 2018-05-07 ENCOUNTER — OFFICE VISIT (OUTPATIENT)
Dept: FAMILY MEDICINE CLINIC | Facility: CLINIC | Age: 73
End: 2018-05-07
Payer: COMMERCIAL

## 2018-05-07 VITALS
TEMPERATURE: 97.9 F | HEART RATE: 64 BPM | HEIGHT: 68 IN | BODY MASS INDEX: 33.92 KG/M2 | SYSTOLIC BLOOD PRESSURE: 140 MMHG | DIASTOLIC BLOOD PRESSURE: 68 MMHG | WEIGHT: 223.8 LBS

## 2018-05-07 DIAGNOSIS — J45.40 MODERATE PERSISTENT ASTHMA WITHOUT COMPLICATION: ICD-10-CM

## 2018-05-07 DIAGNOSIS — Z00.00 MEDICARE ANNUAL WELLNESS VISIT, SUBSEQUENT: Primary | ICD-10-CM

## 2018-05-07 DIAGNOSIS — I10 ESSENTIAL HYPERTENSION: ICD-10-CM

## 2018-05-07 DIAGNOSIS — E11.9 TYPE 2 DIABETES MELLITUS WITHOUT COMPLICATION, WITHOUT LONG-TERM CURRENT USE OF INSULIN (HCC): ICD-10-CM

## 2018-05-07 DIAGNOSIS — E78.2 MIXED HYPERLIPIDEMIA: ICD-10-CM

## 2018-05-07 DIAGNOSIS — J34.2 DEVIATED NASAL SEPTUM: ICD-10-CM

## 2018-05-07 PROBLEM — H26.9 CATARACT, LEFT EYE: Status: RESOLVED | Noted: 2017-10-24 | Resolved: 2018-05-07

## 2018-05-07 PROBLEM — K22.70 BARRETT'S ESOPHAGUS WITHOUT DYSPLASIA: Status: ACTIVE | Noted: 2017-06-05

## 2018-05-07 PROBLEM — K63.5 POLYP OF COLON: Status: RESOLVED | Noted: 2017-06-05 | Resolved: 2018-05-07

## 2018-05-07 PROBLEM — H26.9 CATARACT, LEFT EYE: Status: ACTIVE | Noted: 2017-10-24

## 2018-05-07 PROBLEM — K63.5 POLYP OF COLON: Status: ACTIVE | Noted: 2017-06-05

## 2018-05-07 PROCEDURE — G0439 PPPS, SUBSEQ VISIT: HCPCS | Performed by: FAMILY MEDICINE

## 2018-05-07 PROCEDURE — 1101F PT FALLS ASSESS-DOCD LE1/YR: CPT | Performed by: FAMILY MEDICINE

## 2018-05-07 PROCEDURE — 3725F SCREEN DEPRESSION PERFORMED: CPT | Performed by: FAMILY MEDICINE

## 2018-05-07 PROCEDURE — 99214 OFFICE O/P EST MOD 30 MIN: CPT | Performed by: FAMILY MEDICINE

## 2018-05-07 RX ORDER — FOLIC ACID 20 MG
1 CAPSULE ORAL DAILY
COMMUNITY

## 2018-05-07 RX ORDER — CHLORTHALIDONE 25 MG/1
37.5 TABLET ORAL DAILY
Qty: 135 TABLET | Refills: 3 | Status: SHIPPED | OUTPATIENT
Start: 2018-05-07 | End: 2019-03-25 | Stop reason: SDUPTHER

## 2018-05-07 RX ORDER — CARVEDILOL 25 MG/1
1 TABLET ORAL 2 TIMES DAILY
COMMUNITY
End: 2018-07-10 | Stop reason: SDUPTHER

## 2018-05-07 RX ORDER — EAR PLUGS
1 EACH OTIC (EAR) DAILY
COMMUNITY

## 2018-05-07 RX ORDER — CHLORAL HYDRATE 500 MG
1 CAPSULE ORAL DAILY
COMMUNITY

## 2018-05-07 RX ORDER — VALSARTAN 320 MG/1
1 TABLET ORAL DAILY
COMMUNITY
Start: 2015-08-24 | End: 2018-06-02 | Stop reason: SDUPTHER

## 2018-05-07 RX ORDER — OMEPRAZOLE 40 MG/1
1 CAPSULE, DELAYED RELEASE ORAL 2 TIMES DAILY
COMMUNITY

## 2018-05-07 RX ORDER — CHLORTHALIDONE 25 MG/1
1.5 TABLET ORAL DAILY
COMMUNITY
Start: 2016-02-26 | End: 2018-05-07 | Stop reason: SDUPTHER

## 2018-05-07 RX ORDER — ASPIRIN 81 MG/1
81 TABLET ORAL DAILY
COMMUNITY

## 2018-05-07 NOTE — PROGRESS NOTES
Assessment/Plan:     Diagnoses and all orders for this visit:    Medicare annual wellness visit, subsequent    Essential hypertension  -     chlorthalidone 25 mg tablet; Take 1 5 tablets (37 5 mg total) by mouth daily for 90 days  -     CBC and differential  -     Comprehensive metabolic panel    Type 2 diabetes mellitus without complication, without long-term current use of insulin ([facility])  -     HEMOGLOBIN A1C W/ EAG ESTIMATION    Mixed hyperlipidemia  -     Lipid panel    Moderate persistent asthma without complication    Deviated nasal septum    Other orders  -     fluticasone-salmeterol (ADVAIR DISKUS) 250-50 mcg/dose inhaler; Inhale 1 puff 2 (two) times a day  -     aspirin (ECOTRIN LOW STRENGTH) 81 mg EC tablet; Take 81 mg by mouth daily  -     Omega-3 Fatty Acids (FISH OIL) 1,000 mg; Take 1 capsule by mouth daily  -     fluticasone-salmeterol (ADVAIR) 250-50 mcg/dose inhaler; Inhale 1 puff 2 (two) times a day  -     carvedilol (COREG) 25 mg tablet; Take 1 tablet by mouth 2 (two) times a day  -     Folic Acid 20 MG CAPS; Take 1 capsule by mouth daily  -     metFORMIN (GLUCOPHAGE) 500 mg tablet; Take 1 tablet by mouth 2 (two) times a day  -     omeprazole (PriLOSEC) 40 MG capsule; Take 1 capsule by mouth 2 (two) times a day  -     polyethylene glycol-propylene glycol (SYSTANE ULTRA) 0 4-0 3 %; Apply to eye  -     valsartan (DIOVAN) 320 MG tablet; Take 1 tablet by mouth daily  -     Cyanocobalamin (VITAMIN B-12) 1000 MCG/15ML LIQD; Take 1 tablet by mouth daily  -     Cholecalciferol (VITAMIN D3) 1000 UNIT/SPRAY LIQD; Take by mouth         continue with current medications  I suggested over-the-counter Flonase daily for chronic nasal symptoms  Repeat labs prior to next visit in 6 months  Diet weight loss and exercise  I recommended the shingles vaccine  Yearly flu vaccine  Up-to-date with pneumococcal vaccines  Patient ID: Edilberto Wiggins  is a 72 y o  male  Follow up visit  medications reviewed  history of resistant hypertension blood pressures stable on current multi drug regimen  04/2018 creatinine 1 02  electrolytes normal  Hgb 14  3   mixed hyperlipidemia on 2 fish oil capsules a day  lipid profile cholesterol 161  TGs 300  (TGs 501 in 2015)  HDL 32  LDL 69  LFTs normal  TSH 1 99  type 2 DM on Metformin 500 mg BID  04/2018 A1c 6 5 urine micro albumin 1 8  on ARB  no hypoglycemic events  mild neuropathy symptoms numbness no pain  last eye exam 09/2017  admission 08/2015 for right-sided chest pain associated with increased shortness of breath and accelerated hypertension  He ruled out for MI  EKG no acute EKG changes  nuclear stress test normal  no perfusion abnormalities  EF 67%  He was seen by nephrology  workup for secondary causes of hypertension metanephrine normal at 47  normetanephrine elevated at 183  renin 19 27  aldosterone 2 2 renal artery dopplers 03/2015 no ISIAH  kidneys hypertrophic  2 8 cm cyst left kidney  The following portions of the patient's history were reviewed and updated as appropriate: allergies, current medications, past family history, past medical history, past social history, past surgical history and problem list     Review of Systems   Constitutional: Negative for activity change, appetite change, chills, fever and unexpected weight change  HENT: Negative for congestion, ear pain, postnasal drip, rhinorrhea, sinus pain, sore throat, trouble swallowing and voice change  + chronic nasal congestion  Eyes: Negative for visual disturbance  S/p cataract surgery OS   Respiratory: Negative for cough, shortness of breath and wheezing  Asthma stable on Advair  FIOR on CPAP with auto adjustment feature  Cardiovascular: Negative for chest pain, palpitations and leg swelling  intermittent leg claudication and PAD no rest pain  pain left hip and calf pain after walking long distances  11/2016 arterial dopplers LEs  Diffuse heterogeneous plaque throughout both legs  Evidence of right lower extremity occlusive disease in mid SFA  aortoiliac study normal but limited views of proximal common iliac arteries due to body habitus   Gastrointestinal: Negative for abdominal pain, blood in stool, constipation, diarrhea, nausea and vomiting  GERD stable on Omeprazole  no dysphagia  EGD 09/2015  colonoscopy 06/2014  1 colon polyp descending colon, diverticulosis and internal hemorrhoids  history of fatty liver 04/2018 LFTs normal   01/2018 abdominal ultrasound normal except for mild diffuse fatty infiltration of liver   Genitourinary: Negative for difficulty urinating and urgency  04/2018 PSA 1 34   Musculoskeletal: Negative for arthralgias and myalgias  Skin: Negative for rash  Allergic/Immunologic: Negative for environmental allergies  Neurological: Negative for dizziness, weakness, light-headedness and headaches  Hematological: Negative for adenopathy  Does not bruise/bleed easily  Psychiatric/Behavioral: Negative for dysphoric mood and sleep disturbance  Objective:      /68 (BP Location: Left arm, Patient Position: Sitting, Cuff Size: Large)   Pulse 64   Temp 97 9 °F (36 6 °C)   Ht 5' 7 5" (1 715 m)   Wt 102 kg (223 lb 12 8 oz)   BMI 34 53 kg/m²          Physical Exam   Constitutional: He is oriented to person, place, and time  He appears well-developed and well-nourished  No distress  HENT:   Right Ear: Tympanic membrane normal    Left Ear: Tympanic membrane normal    Mouth/Throat: Oropharynx is clear and moist    + deviated nasal septum    Eyes: Conjunctivae and EOM are normal  Pupils are equal, round, and reactive to light  No scleral icterus  Fundi not seen    Neck: Neck supple  No JVD present  Carotid bruit is not present  No tracheal deviation present  No thyroid mass and no thyromegaly present  Cardiovascular: Normal rate, regular rhythm and normal heart sounds  Exam reveals no gallop    Pulses are weak pulses  No murmur heard  Pulses:       Carotid pulses are 2+ on the right side, and 2+ on the left side  Dorsalis pedis pulses are 1+ on the right side, and 1+ on the left side  Posterior tibial pulses are 1+ on the right side, and 1+ on the left side  Pulmonary/Chest: Effort normal and breath sounds normal  No respiratory distress  He has no wheezes  He has no rales  Abdominal: Soft  Bowel sounds are normal  He exhibits no distension and no mass  There is no hepatosplenomegaly  There is no tenderness  There is no rebound and no guarding  Musculoskeletal: Normal range of motion  He exhibits edema (trace ankle edema  )  Feet:   Right Foot:   Skin Integrity: Negative for ulcer, skin breakdown, erythema, warmth, callus or dry skin  Left Foot:   Skin Integrity: Negative for ulcer, skin breakdown, erythema, warmth, callus or dry skin  Lymphadenopathy:     He has no cervical adenopathy  Neurological: He is alert and oriented to person, place, and time  No cranial nerve deficit  Skin: No rash noted  Psychiatric: He has a normal mood and affect  His behavior is normal    Nursing note and vitals reviewed  Patient's shoes and socks removed  Right Foot/Ankle   Right Foot Inspection  Skin Exam: skin normal and skin intact no dry skin, no warmth, no callus, no erythema, no maceration, no abnormal color, no pre-ulcer, no ulcer and no callus                          Toe Exam: ROM and strength within normal limits  Sensory       Monofilament testing: intact  Vascular  Capillary refills: < 3 seconds  The right DP pulse is 1+  The right PT pulse is 1+       Left Foot/Ankle  Left Foot Inspection  Skin Exam: skin normal and skin intactno dry skin, no warmth, no erythema, no maceration, normal color, no pre-ulcer, no ulcer and no callus                         Toe Exam: ROM and strength within normal limits                   Sensory       Monofilament: intact  Vascular  Capillary refills: < 3 seconds  The left DP pulse is 1+  The left PT pulse is 1+  Assign Risk Category:  No deformity present; No loss of protective sensation; Weak pulses       Risk: 1      Recent Results (from the past 4032 hour(s))   HEMOGLOBIN A1C W/ EAG ESTIMATION    Collection Time: 04/27/18 12:00 AM   Result Value Ref Range    EXT Hemoglobin A1C 6 5    MICROALBUMIN / CREATININE URINE RATIO (HISTORICAL)    Collection Time: 04/27/18 12:00 AM   Result Value Ref Range    MICROALBUM ,U,RANDOM 1 8 mg/L    MICROALBUMIN/CREATININE RATIO 17 mg/g creatinine     AWV Clinical     ISAR:   Previous hospitalizations?:  Yes       Once in a Lifetime Medicare Screening:   EKG performed?:  Yes    AAA screening performed? (if performed, please add date to Health Maintenance): Yes    Results:  11/2016 no AAA       Medicare Screening Tests and Risk Assessment:   AAA Risk Assessment     Tobacco use (males only):   Yes   Age over 72 (males only):  Yes    Osteoporosis Risk Assessment    :  No    Age over 48:  No    HIV Risk Assessment    None indicated:  Yes        Drug and Alcohol Use:   Tobacco use    Cigarettes:  former smoker    Tobacco use duration    Tobacco Cessation Readiness    Alcohol use    Alcohol use:  rare use    Alcohol Treatment Readiness   Illicit Drug Use    Drug use:  never        Diet & Exercise:   Diet   What is your diet?:  Regular   How many servings a day of the following:   Exercise    Do you currently exercise?:  yes    Frequency:  rarely    Type of exercise:  walking       Cognitive Impairment Screening:   Depression screening preformed:  Yes    Geriatric Depression scale score:  5    Depression screening results:  mild to moderate symptoms   Cognitive Impairment Screening    Do you have difficulty learning or retaining new information?:  No Do you have difficulty handling new tasks?:  No   Do you have difficulty with reasoning?:  No Do you have difficulty with spatial ability and orientation?:  No   Do you have difficulty with language?:  No Do you have difficulty with behavior?:  No       Functional Ability/Level of Safety:   Hearing    Hearing difficulties:  No Bilateral:  normal   Left:  normal Right:  normal   Hearing Impairment Assessment    Hearing status:  No impairment   Do your family members ever complain that you turn on the radio or T V  too loudly?:  No Do you find that other people have to repeat themselves when talking to you?:  No   Do you have difficulty hearing while talking on the phone?:  No Has anyone ever told you that you are speaking too loudly when talking with them?:  No   Do you have trouble hearing the doorbell or phone ringing?:  No Do you have difficulty hearing such that you feel frustrated talking to people?:  No   Do you feel sad because you cannot hear well?:  No Do you feel inconvienced due to your hearing problem?:  No   Do you think you would be a happier person if you could hear better?:  No Would you be willing to go for a hearing aid fitting if suggested?:  No   Current Activities    Status:  unlimited ADL's, unlimited driving, unlimited IADL's, unlimited social activities   Help needed with the folllowing:    Using the phone:  No Transportation:  No   Shopping:  No Preparing Meals:  No   Doing Housework:  No Doing Laundry:  No   Managing Medications:  No Managing Money:  No   ADL    Feeding:  Independant   Oral hygiene and Facial grooming:  Independant   Bathing:  Independant   Upper Body Dressing:  Independant   Lower Body Dressing:  Independant   Toileting:  Independant   Bed Mobility:  Independant   Fall Risk   Have you fallen in the last 12 months?:  No Are you unsteady on your feet?:  No    Are you taking any medications that may cause fatigue or dizziness?:  No   Do you have any chronic conditions that may contribute to a fall?:  Diabetes Do you rush to the bathroom potentially risking a fall?:  No   Injury History       Home Safety:   Are there hazards in your environment?: No   If you fell, would you need help to get back up from the ground?:  No Do you have problems or concerns getting in/out of a bed, chair, tub, or toilet?:  No   Do you feel unsteady when walking?:  No Is your activity limited by pain?:  No   Do you have handrails and grab-bars in the home?:  Yes Are emergency numbers kept by the phone and regularly updated?:  Yes   Are you and/or family members aware of the dangers of smoking in bed?:  Yes    Do you have working smoke alarms and fire extinguisher?:  Yes    Have you left the stove on unsupervised?:  No    Home Safety Risk Factors   Unfamilar with surroundings:  No Uneven floors:  No   Stairs or handrail saftey risk:  No Loose rugs:  No   Household clutter:  No Poor household lighting:  No   No grab bars in bathroom:  No Further evaluation needed:  No       Advanced Directives:   Advanced Directives    Living Will:  No Durable POA for healthcare:  No   Advanced directive:  No    Patient's End of Life Decisions        Urinary Incontinence:   Do you have urinary incontinence?:  No        Glaucoma:           Provider Screening     Preventative Screening/Counseling:   Cardiovascular Screening/Counseling:   (Labs Q5 years, EKG optional one-time)   General:  Screening Not Indicated Counseling:  Healthy Diet, Healthy Weight          Diabetes Screening/Counseling:   (2 tests/year if Pre-Diabetes or 1 test/year if no Diabetes)   General:  Screening Current           Colorectal Cancer Screening/Counseling:   (FOBT Q1 yr; Flex Sig Q4 yrs or Q10 yrs after Screening Colonoscopy; Screening Colonoscpy Q2 yrs High Risk or Q10 yrs Low Risk; Barium Enema Q2 yrs High Risk or Q4 yrs Low Risk)   General:  Screening Current           Prostate Cancer Screening/Counseling:   (Annual)    General:  Screening Current          Breast Cancer Screening/Counseling:   (Baseline Age 28 - 43;  Annual Age 36+)         Cervical Cancer Screening/Counseling:   (Annual for High Risk or Childbearing Age with Abnormal Pap in Last 3 yrs; Every 2 all others)         Osteoporosis Screening/Counseling:   (Every 2 Yrs if at risk or more if medically necessary)   General:  Screening Not Indicated           AAA Screening/Counseling:   (Once per Lifetime with risk factors)     Age over 72 (males only):  Yes Tobacco use (males only):  Yes   General:  Screening Current           Glaucoma Screening/Counseling:   (Annual)   General:  Screening Current          HIV Screening/Counseling:   (Voluntary; Once annually for high risk OR 3 times for Pregnancy at diagnosis of IUP; 3rd trimester; and at Labor   General:  Screening Not Indicated           Hepatitis C Screening:             Immunizations:   Influenza (annual):   Influenza UTD This Year   Pneumococcal (Once in a Lifetime):  Lifetime Vaccine Completed   Zostavax (Medicare D Coverage, Pt >72 yo):  Risks & Benefits Discussed       Other Preventative Couseling (Non-Medicare Wellness Visit Required):   nutrition counseling performed, weight reduction was discussed, Increased physical activity counseling given       Referrals (Non-Medicare Wellness Visit Required):       Medical Equipment/Suppliers:             Health Maintenance   Topic Date Due    INFLUENZA VACCINE  09/01/2018    GLAUCOMA SCREENING 67+ YR  09/13/2018    OPHTHALMOLOGY EXAM  09/13/2018    HEMOGLOBIN A1C  10/27/2018    URINE MICROALBUMIN  04/27/2019    Fall Risk  05/07/2019    Depression Screening PHQ-9  05/07/2019    Diabetic Foot Exam  05/07/2019    DTaP,Tdap,and Td Vaccines (2 - Td) 05/07/2028    COLONOSCOPY  05/07/2028    Hepatitis C Screening  Addressed    ABDOMINAL AORTIC ANEURYSM (AAA) SCREEN  Completed    PNEUMOCOCCAL POLYSACCHARIDE VACCINE AGE 72 AND OVER  Completed     Patient Care Team:  Randolph Flores MD as PCP - Sole Robert MD

## 2018-06-02 DIAGNOSIS — I10 ESSENTIAL HYPERTENSION: Primary | ICD-10-CM

## 2018-06-03 PROCEDURE — 4010F ACE/ARB THERAPY RXD/TAKEN: CPT | Performed by: FAMILY MEDICINE

## 2018-06-03 RX ORDER — VALSARTAN 320 MG/1
TABLET ORAL
Qty: 90 TABLET | Refills: 3 | Status: SHIPPED | OUTPATIENT
Start: 2018-06-03 | End: 2019-03-25 | Stop reason: SDUPTHER

## 2018-06-28 DIAGNOSIS — L30.9 DERMATITIS: Primary | ICD-10-CM

## 2018-06-28 RX ORDER — TRIAMCINOLONE ACETONIDE 1 MG/G
CREAM TOPICAL 2 TIMES DAILY
Qty: 80 G | Refills: 3 | Status: SHIPPED | OUTPATIENT
Start: 2018-06-28 | End: 2021-07-19 | Stop reason: SDUPTHER

## 2018-07-10 DIAGNOSIS — I10 ESSENTIAL HYPERTENSION: Primary | ICD-10-CM

## 2018-07-10 RX ORDER — CARVEDILOL 25 MG/1
25 TABLET ORAL 2 TIMES DAILY
Qty: 180 TABLET | Refills: 3 | Status: SHIPPED | OUTPATIENT
Start: 2018-07-10 | End: 2019-06-14 | Stop reason: SDUPTHER

## 2018-07-18 DIAGNOSIS — J44.9 CHRONIC OBSTRUCTIVE PULMONARY DISEASE, UNSPECIFIED COPD TYPE (HCC): Primary | ICD-10-CM

## 2018-08-30 LAB
LEFT EYE DIABETIC RETINOPATHY: NORMAL
RIGHT EYE DIABETIC RETINOPATHY: NORMAL

## 2018-08-30 PROCEDURE — 3072F LOW RISK FOR RETINOPATHY: CPT | Performed by: FAMILY MEDICINE

## 2018-11-12 LAB — HBA1C MFR BLD HPLC: 6.8 %

## 2018-11-19 ENCOUNTER — OFFICE VISIT (OUTPATIENT)
Dept: FAMILY MEDICINE CLINIC | Facility: CLINIC | Age: 73
End: 2018-11-19
Payer: COMMERCIAL

## 2018-11-19 VITALS
DIASTOLIC BLOOD PRESSURE: 58 MMHG | RESPIRATION RATE: 16 BRPM | BODY MASS INDEX: 35.46 KG/M2 | WEIGHT: 234 LBS | TEMPERATURE: 97.4 F | SYSTOLIC BLOOD PRESSURE: 122 MMHG | HEART RATE: 52 BPM | HEIGHT: 68 IN

## 2018-11-19 DIAGNOSIS — G47.33 OBSTRUCTIVE SLEEP APNEA: ICD-10-CM

## 2018-11-19 DIAGNOSIS — I10 ESSENTIAL HYPERTENSION: ICD-10-CM

## 2018-11-19 DIAGNOSIS — K22.70 BARRETT'S ESOPHAGUS WITHOUT DYSPLASIA: ICD-10-CM

## 2018-11-19 DIAGNOSIS — J45.40 MODERATE PERSISTENT ASTHMA WITHOUT COMPLICATION: ICD-10-CM

## 2018-11-19 DIAGNOSIS — K76.0 FATTY LIVER: ICD-10-CM

## 2018-11-19 DIAGNOSIS — I73.9 PERIPHERAL ARTERIAL DISEASE (HCC): ICD-10-CM

## 2018-11-19 DIAGNOSIS — Z23 NEED FOR IMMUNIZATION AGAINST INFLUENZA: ICD-10-CM

## 2018-11-19 DIAGNOSIS — E66.01 CLASS 2 SEVERE OBESITY WITH SERIOUS COMORBIDITY AND BODY MASS INDEX (BMI) OF 36.0 TO 36.9 IN ADULT, UNSPECIFIED OBESITY TYPE (HCC): ICD-10-CM

## 2018-11-19 DIAGNOSIS — E11.42 TYPE 2 DIABETES MELLITUS WITH DIABETIC POLYNEUROPATHY, WITHOUT LONG-TERM CURRENT USE OF INSULIN (HCC): Primary | ICD-10-CM

## 2018-11-19 DIAGNOSIS — K21.9 GASTROESOPHAGEAL REFLUX DISEASE WITHOUT ESOPHAGITIS: ICD-10-CM

## 2018-11-19 DIAGNOSIS — E78.2 MIXED HYPERLIPIDEMIA: ICD-10-CM

## 2018-11-19 DIAGNOSIS — Z12.5 SCREENING FOR PROSTATE CANCER: ICD-10-CM

## 2018-11-19 PROCEDURE — G0008 ADMIN INFLUENZA VIRUS VAC: HCPCS

## 2018-11-19 PROCEDURE — 3078F DIAST BP <80 MM HG: CPT | Performed by: FAMILY MEDICINE

## 2018-11-19 PROCEDURE — 1036F TOBACCO NON-USER: CPT | Performed by: FAMILY MEDICINE

## 2018-11-19 PROCEDURE — 4040F PNEUMOC VAC/ADMIN/RCVD: CPT

## 2018-11-19 PROCEDURE — 3008F BODY MASS INDEX DOCD: CPT | Performed by: FAMILY MEDICINE

## 2018-11-19 PROCEDURE — 90662 IIV NO PRSV INCREASED AG IM: CPT

## 2018-11-19 PROCEDURE — 99214 OFFICE O/P EST MOD 30 MIN: CPT | Performed by: FAMILY MEDICINE

## 2018-11-19 PROCEDURE — 1160F RVW MEDS BY RX/DR IN RCRD: CPT

## 2018-11-19 PROCEDURE — 3074F SYST BP LT 130 MM HG: CPT | Performed by: FAMILY MEDICINE

## 2018-11-19 PROCEDURE — 1160F RVW MEDS BY RX/DR IN RCRD: CPT | Performed by: FAMILY MEDICINE

## 2018-11-19 NOTE — PROGRESS NOTES
Assessment/Plan:         Diagnoses and all orders for this visit:    Type 2 diabetes mellitus with diabetic polyneuropathy, without long-term current use of insulin (HCC)  -     Hemoglobin A1C  -     Microalbumin / creatinine urine ratio    Class 2 severe obesity with serious comorbidity and body mass index (BMI) of 36 0 to 36 9 in adult, unspecified obesity type (HCC)    BMI 36 0-36 9,adult    Essential hypertension  -     CBC and differential  -     Comprehensive metabolic panel    Peripheral arterial disease (HCC)    Mixed hyperlipidemia  -     Lipid panel  -     TSH, 3rd generation with Free T4 reflex    Obstructive sleep apnea    Moderate persistent asthma without complication    Gastroesophageal reflux disease without esophagitis    Kessler's esophagus without dysplasia    Fatty liver    Screening for prostate cancer  -     PSA, Total Screen; Future        Continue with current medications  Repeat labs prior to next visit in 6 months  Flu vaccine today    BMI Counseling: Body mass index is 36 11 kg/m²  Discussed with patient's BMI with him  The BMI is above average  BMI counseling and education was provided to the patient  Nutrition recommendations include reducing portion sizes, decreasing overall calorie intake and reducing intake of cholesterol  Exercise recommendations include exercising 3-5 times per week  Patient ID: Corrinne Crafts  is a 72 y o  male  Follow up visit  medications reviewed  Labs 11/2018 hypertension blood pressures stable on current multi drug regimen  creatinine 1 24  electrolytes normal  Hgb 15 5   mixed hyperlipidemia on 2 fish oil capsules a day  lipid profile cholesterol 179  TGs 326  (TGs 501 in 2015)  HDL 31  LDL 83  LFTs normal except ALT 59  04/2018 TSH 1 99  type 2 DM on Metformin 500 mg BID  A1c 6 8  04/2018  urine micro albumin 1 8  on ARB  no hypoglycemic events  mild neuropathy symptoms numbness no pain   Current with eye exam          The following portions of the patient's history were reviewed and updated as appropriate: allergies, current medications, past family history, past medical history, past social history, past surgical history and problem list     Review of Systems   Constitutional: Negative for appetite change, chills, fever and unexpected weight change  HENT: Positive for congestion  Negative for ear pain, rhinorrhea, sinus pain, sore throat and trouble swallowing          + chronic nasal congestion  Eyes: Negative for visual disturbance  S/p cataract surgery OS   Respiratory: Negative for cough, shortness of breath and wheezing  Asthma stable on Advair  He cut his dose to once a day  FIOR on CPAP with auto adjustment feature  Cardiovascular: Negative for chest pain, palpitations and leg swelling  Admission 08/2015 for right-sided chest pain associated with increased shortness of breath and accelerated hypertension  He ruled out for MI  EKG no acute EKG changes  nuclear stress test normal  no perfusion abnormalities  EF 67%  He was seen by nephrology  workup for secondary causes of hypertension metanephrine normal at 47  normetanephrine elevated at 183  renin 19 27  aldosterone 2 2 renal artery dopplers 03/2015 no ISIAH  kidneys hypertrophic  2 8 cm cyst left kidney  intermittent leg claudication and PAD no rest pain  pain left hip and calf pain after walking long distances  11/2016 arterial dopplers LEs  Diffuse heterogeneous plaque throughout both legs  Evidence of right lower extremity occlusive disease in mid SFA  aortoiliac study normal but limited views of proximal common iliac arteries due to body habitus   Gastrointestinal: Negative for abdominal pain, blood in stool, constipation, diarrhea, nausea and vomiting  GERD stable on Omeprazole  no dysphagia  EGD 09/2015  colonoscopy 06/2014  1 colon polyp descending colon, diverticulosis and internal hemorrhoids   history of fatty liver 04/2018 LFTs normal   01/2018 abdominal ultrasound normal except for mild diffuse fatty infiltration of liver   Genitourinary: Negative for difficulty urinating  04/2018 PSA 1 34   Musculoskeletal: Negative for arthralgias and myalgias  Skin: Negative for rash  Allergic/Immunologic: Negative for environmental allergies  Neurological: Negative for dizziness, weakness and headaches  Hematological: Negative for adenopathy  Does not bruise/bleed easily  Psychiatric/Behavioral: Negative for dysphoric mood and sleep disturbance  Objective:      /58   Pulse (!) 52   Temp (!) 97 4 °F (36 3 °C)   Resp 16   Ht 5' 7 5" (1 715 m)   Wt 106 kg (234 lb)   BMI 36 11 kg/m²          Physical Exam   Constitutional: He is oriented to person, place, and time  He appears well-developed and well-nourished  No distress  HENT:   Right Ear: Tympanic membrane normal    Left Ear: Tympanic membrane normal    Mouth/Throat: Oropharynx is clear and moist    Eyes: Pupils are equal, round, and reactive to light  Conjunctivae and EOM are normal  No scleral icterus  Neck: Normal range of motion  Neck supple  No JVD present  Carotid bruit is not present  No tracheal deviation present  No thyroid mass and no thyromegaly present  Cardiovascular: Normal rate, regular rhythm and normal heart sounds  Exam reveals no gallop  No murmur heard  Pulses:       Carotid pulses are 2+ on the right side, and 2+ on the left side  Pulmonary/Chest: Effort normal and breath sounds normal  No respiratory distress  He has no wheezes  He has no rales  Abdominal: Soft  Bowel sounds are normal  He exhibits no distension and no mass  There is no hepatosplenomegaly  There is no tenderness  There is no rebound and no guarding  Musculoskeletal: Normal range of motion  He exhibits no edema  Lymphadenopathy:     He has no cervical adenopathy  Neurological: He is alert and oriented to person, place, and time  No cranial nerve deficit  Skin: No rash noted  Psychiatric: He has a normal mood and affect  Nursing note and vitals reviewed

## 2018-12-10 DIAGNOSIS — E11.9 TYPE 2 DIABETES MELLITUS WITHOUT COMPLICATION, WITHOUT LONG-TERM CURRENT USE OF INSULIN (HCC): Primary | ICD-10-CM

## 2019-02-15 DIAGNOSIS — I10 ESSENTIAL HYPERTENSION: ICD-10-CM

## 2019-02-15 RX ORDER — AMLODIPINE BESYLATE 10 MG/1
10 TABLET ORAL DAILY
Qty: 90 TABLET | Refills: 3 | Status: SHIPPED | OUTPATIENT
Start: 2019-02-15 | End: 2019-11-22 | Stop reason: SDUPTHER

## 2019-03-25 DIAGNOSIS — I10 ESSENTIAL HYPERTENSION: ICD-10-CM

## 2019-03-25 RX ORDER — VALSARTAN 320 MG/1
320 TABLET ORAL DAILY
Qty: 90 TABLET | Refills: 3 | Status: SHIPPED | OUTPATIENT
Start: 2019-03-25 | End: 2020-03-16 | Stop reason: SDUPTHER

## 2019-03-25 RX ORDER — CHLORTHALIDONE 25 MG/1
37.5 TABLET ORAL DAILY
Qty: 135 TABLET | Refills: 3 | Status: SHIPPED | OUTPATIENT
Start: 2019-03-25 | End: 2020-03-09 | Stop reason: SDUPTHER

## 2019-05-10 LAB
CREAT ?TM UR-SCNC: 240 UMOL/L
EXT MICROALBUMIN URINE RANDOM: 6.4
HBA1C MFR BLD HPLC: 7 %
MICROALBUMIN/CREAT UR: 26.7 MG/G{CREAT}

## 2019-05-10 PROCEDURE — 3061F NEG MICROALBUMINURIA REV: CPT | Performed by: FAMILY MEDICINE

## 2019-05-20 ENCOUNTER — OFFICE VISIT (OUTPATIENT)
Dept: FAMILY MEDICINE CLINIC | Facility: CLINIC | Age: 74
End: 2019-05-20
Payer: COMMERCIAL

## 2019-05-20 VITALS
BODY MASS INDEX: 34.25 KG/M2 | WEIGHT: 226 LBS | SYSTOLIC BLOOD PRESSURE: 114 MMHG | HEART RATE: 64 BPM | TEMPERATURE: 96.8 F | RESPIRATION RATE: 16 BRPM | DIASTOLIC BLOOD PRESSURE: 64 MMHG | HEIGHT: 68 IN

## 2019-05-20 DIAGNOSIS — K21.9 GASTROESOPHAGEAL REFLUX DISEASE WITHOUT ESOPHAGITIS: ICD-10-CM

## 2019-05-20 DIAGNOSIS — K22.70 BARRETT'S ESOPHAGUS WITHOUT DYSPLASIA: ICD-10-CM

## 2019-05-20 DIAGNOSIS — E66.9 OBESITY (BMI 30-39.9): ICD-10-CM

## 2019-05-20 DIAGNOSIS — K76.0 FATTY LIVER: ICD-10-CM

## 2019-05-20 DIAGNOSIS — J06.9 ACUTE URI: Primary | ICD-10-CM

## 2019-05-20 DIAGNOSIS — J45.40 MODERATE PERSISTENT ASTHMA WITHOUT COMPLICATION: ICD-10-CM

## 2019-05-20 DIAGNOSIS — I10 ESSENTIAL HYPERTENSION: ICD-10-CM

## 2019-05-20 DIAGNOSIS — E78.2 MIXED HYPERLIPIDEMIA: ICD-10-CM

## 2019-05-20 DIAGNOSIS — E11.42 TYPE 2 DIABETES MELLITUS WITH DIABETIC POLYNEUROPATHY, WITHOUT LONG-TERM CURRENT USE OF INSULIN (HCC): ICD-10-CM

## 2019-05-20 PROCEDURE — 3078F DIAST BP <80 MM HG: CPT | Performed by: FAMILY MEDICINE

## 2019-05-20 PROCEDURE — 3725F SCREEN DEPRESSION PERFORMED: CPT | Performed by: FAMILY MEDICINE

## 2019-05-20 PROCEDURE — 99214 OFFICE O/P EST MOD 30 MIN: CPT | Performed by: FAMILY MEDICINE

## 2019-05-20 PROCEDURE — 3074F SYST BP LT 130 MM HG: CPT | Performed by: FAMILY MEDICINE

## 2019-05-20 RX ORDER — AZITHROMYCIN 250 MG/1
TABLET, FILM COATED ORAL
Qty: 6 TABLET | Refills: 0 | Status: SHIPPED | OUTPATIENT
Start: 2019-05-20 | End: 2019-05-24

## 2019-05-30 ENCOUNTER — OFFICE VISIT (OUTPATIENT)
Dept: FAMILY MEDICINE CLINIC | Facility: CLINIC | Age: 74
End: 2019-05-30
Payer: COMMERCIAL

## 2019-05-30 VITALS
HEART RATE: 72 BPM | BODY MASS INDEX: 35.16 KG/M2 | SYSTOLIC BLOOD PRESSURE: 124 MMHG | TEMPERATURE: 97.7 F | WEIGHT: 232 LBS | DIASTOLIC BLOOD PRESSURE: 64 MMHG | OXYGEN SATURATION: 94 % | RESPIRATION RATE: 17 BRPM | HEIGHT: 68 IN

## 2019-05-30 DIAGNOSIS — L29.0 PRURITUS ANI: Primary | ICD-10-CM

## 2019-05-30 PROCEDURE — 99213 OFFICE O/P EST LOW 20 MIN: CPT | Performed by: PHYSICIAN ASSISTANT

## 2019-05-30 RX ORDER — DIAPER,BRIEF,INFANT-TODD,DISP
EACH MISCELLANEOUS 2 TIMES DAILY
Qty: 30 G | Refills: 0 | Status: SHIPPED | OUTPATIENT
Start: 2019-05-30 | End: 2019-06-12

## 2019-06-12 ENCOUNTER — OFFICE VISIT (OUTPATIENT)
Dept: FAMILY MEDICINE CLINIC | Facility: CLINIC | Age: 74
End: 2019-06-12
Payer: COMMERCIAL

## 2019-06-12 VITALS
RESPIRATION RATE: 16 BRPM | HEIGHT: 68 IN | SYSTOLIC BLOOD PRESSURE: 164 MMHG | HEART RATE: 57 BPM | DIASTOLIC BLOOD PRESSURE: 66 MMHG | OXYGEN SATURATION: 94 % | TEMPERATURE: 97.9 F | BODY MASS INDEX: 36.07 KG/M2 | WEIGHT: 238 LBS

## 2019-06-12 DIAGNOSIS — K13.70 MOUTH LESION: Primary | ICD-10-CM

## 2019-06-12 PROCEDURE — 3008F BODY MASS INDEX DOCD: CPT | Performed by: FAMILY MEDICINE

## 2019-06-12 PROCEDURE — 99213 OFFICE O/P EST LOW 20 MIN: CPT | Performed by: FAMILY MEDICINE

## 2019-06-12 PROCEDURE — 1101F PT FALLS ASSESS-DOCD LE1/YR: CPT | Performed by: FAMILY MEDICINE

## 2019-06-12 PROCEDURE — 1160F RVW MEDS BY RX/DR IN RCRD: CPT | Performed by: FAMILY MEDICINE

## 2019-06-12 PROCEDURE — 1036F TOBACCO NON-USER: CPT | Performed by: FAMILY MEDICINE

## 2019-06-14 DIAGNOSIS — E11.9 TYPE 2 DIABETES MELLITUS WITHOUT COMPLICATION, WITHOUT LONG-TERM CURRENT USE OF INSULIN (HCC): ICD-10-CM

## 2019-06-14 DIAGNOSIS — I10 ESSENTIAL HYPERTENSION: ICD-10-CM

## 2019-06-14 RX ORDER — CARVEDILOL 25 MG/1
25 TABLET ORAL 2 TIMES DAILY
Qty: 180 TABLET | Refills: 3 | Status: SHIPPED | OUTPATIENT
Start: 2019-06-14 | End: 2020-03-05 | Stop reason: SDUPTHER

## 2019-06-21 DIAGNOSIS — K13.70 MOUTH LESION: ICD-10-CM

## 2019-09-20 ENCOUNTER — TELEPHONE (OUTPATIENT)
Dept: FAMILY MEDICINE CLINIC | Facility: CLINIC | Age: 74
End: 2019-09-20

## 2019-09-20 DIAGNOSIS — K13.79 MOUTH SORE: Primary | ICD-10-CM

## 2019-09-20 NOTE — TELEPHONE ENCOUNTER
Sent to [facility] in error  Called and cancelled RX   Verbally called into [facility]

## 2019-09-20 NOTE — TELEPHONE ENCOUNTER
Patient called stating he was in for an office visit  on 06- for issues with his mouth  He stated the mouth wash that was prescribed for him worked  But now  all  The symptoms came back  Patient is asking if that same mouth wash can be prescribed to him with refills  He would like it sent to [facility] in [address]  Please advise patient when prescription is sent to pharmacy

## 2019-11-13 LAB — HBA1C MFR BLD HPLC: 7 %

## 2019-11-22 ENCOUNTER — OFFICE VISIT (OUTPATIENT)
Dept: FAMILY MEDICINE CLINIC | Facility: CLINIC | Age: 74
End: 2019-11-22
Payer: COMMERCIAL

## 2019-11-22 VITALS
RESPIRATION RATE: 16 BRPM | WEIGHT: 239 LBS | SYSTOLIC BLOOD PRESSURE: 122 MMHG | TEMPERATURE: 97.6 F | DIASTOLIC BLOOD PRESSURE: 60 MMHG | HEIGHT: 68 IN | BODY MASS INDEX: 36.22 KG/M2 | HEART RATE: 56 BPM

## 2019-11-22 DIAGNOSIS — E11.9 TYPE 2 DIABETES MELLITUS WITHOUT COMPLICATION, WITHOUT LONG-TERM CURRENT USE OF INSULIN (HCC): Primary | ICD-10-CM

## 2019-11-22 DIAGNOSIS — K76.0 FATTY LIVER: ICD-10-CM

## 2019-11-22 DIAGNOSIS — K22.70 BARRETT'S ESOPHAGUS WITHOUT DYSPLASIA: ICD-10-CM

## 2019-11-22 DIAGNOSIS — I10 ESSENTIAL HYPERTENSION: ICD-10-CM

## 2019-11-22 DIAGNOSIS — G47.33 OBSTRUCTIVE SLEEP APNEA: ICD-10-CM

## 2019-11-22 DIAGNOSIS — I73.9 PERIPHERAL ARTERIAL DISEASE (HCC): ICD-10-CM

## 2019-11-22 DIAGNOSIS — Z00.00 MEDICARE ANNUAL WELLNESS VISIT, SUBSEQUENT: ICD-10-CM

## 2019-11-22 DIAGNOSIS — K21.9 GASTROESOPHAGEAL REFLUX DISEASE WITHOUT ESOPHAGITIS: ICD-10-CM

## 2019-11-22 DIAGNOSIS — E66.01 CLASS 2 SEVERE OBESITY WITH SERIOUS COMORBIDITY AND BODY MASS INDEX (BMI) OF 36.0 TO 36.9 IN ADULT, UNSPECIFIED OBESITY TYPE (HCC): ICD-10-CM

## 2019-11-22 DIAGNOSIS — J44.9 CHRONIC OBSTRUCTIVE PULMONARY DISEASE, UNSPECIFIED COPD TYPE (HCC): ICD-10-CM

## 2019-11-22 DIAGNOSIS — E78.2 MIXED HYPERLIPIDEMIA: ICD-10-CM

## 2019-11-22 PROBLEM — E66.812 CLASS 2 SEVERE OBESITY WITH SERIOUS COMORBIDITY AND BODY MASS INDEX (BMI) OF 36.0 TO 36.9 IN ADULT (HCC): Status: ACTIVE | Noted: 2019-11-22

## 2019-11-22 PROCEDURE — 1036F TOBACCO NON-USER: CPT | Performed by: FAMILY MEDICINE

## 2019-11-22 PROCEDURE — 99214 OFFICE O/P EST MOD 30 MIN: CPT | Performed by: FAMILY MEDICINE

## 2019-11-22 PROCEDURE — G0439 PPPS, SUBSEQ VISIT: HCPCS | Performed by: FAMILY MEDICINE

## 2019-11-22 PROCEDURE — 1160F RVW MEDS BY RX/DR IN RCRD: CPT | Performed by: FAMILY MEDICINE

## 2019-11-22 RX ORDER — AMLODIPINE BESYLATE 10 MG/1
10 TABLET ORAL DAILY
Qty: 90 TABLET | Refills: 3 | Status: SHIPPED | OUTPATIENT
Start: 2019-11-22 | End: 2021-02-05 | Stop reason: SDUPTHER

## 2019-11-22 NOTE — PROGRESS NOTES
Assessment and Plan:     Problem List Items Addressed This Visit        Digestive    Kessler's esophagus without dysplasia    Gastroesophageal reflux disease    Fatty liver       Endocrine    Diabetes mellitus, type 2 ([facility] [address] ) - Primary    Relevant Orders    Hemoglobin A1C    Microalbumin / creatinine urine ratio       Respiratory    Obstructive sleep apnea    Chronic obstructive pulmonary disease (HCC)       Cardiovascular and Mediastinum    Hypertension    Relevant Medications    amLODIPine (NORVASC) 10 mg tablet    Other Relevant Orders    CBC and differential    Comprehensive metabolic panel    Peripheral arterial disease (HCC)       Other    Mixed hyperlipidemia    Relevant Orders    Lipid panel    Class 2 severe obesity with serious comorbidity and body mass index (BMI) of 36 0 to 36 9 in [facility])      Other Visit Diagnoses     Medicare annual wellness visit, subsequent               Preventive health issues were discussed with patient, and age appropriate screening tests were ordered as noted in patient's After Visit Summary  Personalized health advice and appropriate referrals for health education or preventive services given if needed, as noted in patient's After Visit Summary       History of Present Illness:     Patient presents for Medicare Annual Wellness visit    Patient Care Team:  Cherelle oCsby MD as PCP - MD KAYLAN Brown MD (General Surgery)     Problem List:     Patient Active Problem List   Diagnosis    Diabetes mellitus, type 2 ([facility] [address] )    Mixed hyperlipidemia    Hypertension    Obstructive sleep apnea    Kessler's esophagus without dysplasia    Gastroesophageal reflux disease    Fatty liver    Eczema    Degenerative joint disease of hand    Peripheral arterial disease ([facility] [address] )    Asthma    Chronic obstructive pulmonary disease ([facility] [address] )    Class 2 severe obesity with serious comorbidity and body mass index (BMI) of 36 0 to 36 9 in [facility])      Past Medical and Surgical History:     Past Medical History:   Diagnosis Date    Accelerated essential hypertension     LAST ASSESSED 8/27/15    Benign positional vertigo 8/5/2016    Cataract, left eye 10/24/2017    Diverticulosis     Lumbar radiculopathy 12/7/2016    Polyp of colon 6/5/2017    Overview:  Added automatically from request for surgery [id number]    Prostatitis     LAST ASSESSED 3/17/15     Past Surgical History:   Procedure Laterality Date    CARDIOVASCULAR STRESS TEST        Family History:     Family History   Problem Relation Age of Onset    Emphysema Father         LUNG    Stroke Father       Social History:     Social History     Socioeconomic History    Marital status: /Civil Union     Spouse name: None    Number of children: None    Years of education: None    Highest education level: None   Occupational History    None   Social Needs    Financial resource strain: None    Food insecurity:     Worry: None     Inability: None    Transportation needs:     Medical: None     Non-medical: None   Tobacco Use    Smoking status: Former Smoker    Smokeless tobacco: Never Used   Substance and Sexual Activity    Alcohol use: No    Drug use: No    Sexual activity: None   Lifestyle    Physical activity:     Days per week: None     Minutes per session: None    Stress: None   Relationships    Social connections:     Talks on phone: None     Gets together: None     Attends Taoism service: None     Active member of club or organization: None     Attends meetings of clubs or organizations: None     Relationship status: None    Intimate partner violence:     Fear of current or ex partner: None     Emotionally abused: None     Physically abused: None     Forced sexual activity: None   Other Topics Concern    None   Social History Narrative    None       Medications and Allergies:     Current Outpatient Medications   Medication Sig Dispense Refill    amLODIPine (NORVASC) 10 mg tablet Take 1 tablet (10 mg total) by mouth daily 90 tablet 3    aspirin (ECOTRIN LOW STRENGTH) 81 mg EC tablet Take 81 mg by mouth daily      carvedilol (COREG) 25 mg tablet Take 1 tablet (25 mg total) by mouth 2 (two) times a day 180 tablet 3    chlorthalidone 25 mg tablet Take 1 5 tablets (37 5 mg total) by mouth daily 135 tablet 3    Cholecalciferol (VITAMIN D3) 1000 UNIT/SPRAY LIQD Take by mouth      Cyanocobalamin (VITAMIN B-12) 1000 MCG/15ML LIQD Take 1 tablet by mouth daily      fluticasone-salmeterol (ADVAIR DISKUS) 250-50 mcg/dose inhaler Inhale 1 puff 2 (two) times a day 3 each 3    Folic Acid 20 MG CAPS Take 1 capsule by mouth daily      metFORMIN (GLUCOPHAGE) 500 mg tablet Take 1 tablet (500 mg total) by mouth 2 (two) times a day 90 tablet 3    Omega-3 Fatty Acids (FISH OIL) 1,000 mg Take 1 capsule by mouth daily      omeprazole (PriLOSEC) 40 MG capsule Take 1 capsule by mouth 2 (two) times a day      polyethylene glycol-propylene glycol (SYSTANE ULTRA) 0 4-0 3 % Apply to eye      triamcinolone (KENALOG) 0 1 % cream Apply topically 2 (two) times a day 80 g 3    valsartan (DIOVAN) 320 MG tablet Take 1 tablet (320 mg total) by mouth daily 90 tablet 3     No current facility-administered medications for this visit  No Known Allergies   Immunizations:     Immunization History   Administered Date(s) Administered    H1N1, All Formulations 02/09/2010    Influenza Split High Dose Preservative Free IM 09/23/2013, 10/01/2014, 11/15/2016, 10/24/2017, 11/05/2019    Influenza TIV (IM) 09/06/2011, 09/11/2012    Influenza, high dose seasonal 0 5 mL 11/19/2018, 11/05/2019    Pneumococcal Conjugate 13-Valent 10/12/2015    Pneumococcal Polysaccharide PPV23 09/06/2011, 11/15/2016      Health Maintenance:         Topic Date Due    CRC Screening: Colonoscopy  05/07/2028    Hepatitis C Screening  Addressed     There are no preventive care reminders to display for this patient     Medicare Health Risk Assessment: /60   Pulse 56   Temp 97 6 °F (36 4 °C)   Resp 16   Ht 5' 7 5" (1 715 m)   Wt 108 kg (239 lb)   BMI 36 88 kg/m²      Becca Lovett is here for his Subsequent Wellness visit  Last Medicare Wellness visit information reviewed, patient interviewed and updates made to the record today  Health Risk Assessment:   Patient rates overall health as good  Patient feels that their physical health rating is same  Eyesight was rated as same  Hearing was rated as same  Patient feels that their emotional and mental health rating is same  Pain experienced in the last 7 days has been some  Patient's pain rating has been 3/10  Patient states that he has experienced no weight loss or gain in last 6 months  Fall Risk Screening: In the past year, patient has experienced: no history of falling in past year      Home Safety:  Patient does not have trouble with stairs inside or outside of their home  Patient has working smoke alarms and has no working carbon monoxide detector  Home safety hazards include: not having non-slip bath and/or shower mats  Nutrition:   Current diet is Regular, Diabetic and Limited junk food  Medications:   Patient is currently taking over-the-counter supplements  OTC medications include: see medication list  Patient is able to manage medications  Activities of Daily Living (ADLs)/Instrumental Activities of Daily Living (IADLs):   Walk and transfer into and out of bed and chair?: Yes  Dress and groom yourself?: Yes    Bathe or shower yourself?: Yes    Feed yourself?  Yes  Do your laundry/housekeeping?: Yes  Manage your money, pay your bills and track your expenses?: Yes  Make your own meals?: Yes    Do your own shopping?: Yes    Previous Hospitalizations:   Any hospitalizations or ED visits within the last 12 months?: No      Advance Care Planning:   Living will: No    Advanced directive: No      Cognitive Screening:   Provider or family/friend/caregiver concerned regarding cognition?: No    PREVENTIVE SCREENINGS      Cardiovascular Screening:    General: Screening Not Indicated and History Lipid Disorder      Diabetes Screening:     General: Screening Not Indicated and History Diabetes      Colorectal Cancer Screening:     General: Screening Current      Osteoporosis Screening:    General: Screening Not Indicated      Abdominal Aortic Aneurysm (AAA) Screening:    Risk factors include: age between 73-67 yo and tobacco use        General: Screening Current      Lung Cancer Screening:     General: Risks and Benefits Discussed and Patient Declines      Hepatitis C Screening:    General: Screening Current    Other Counseling Topics:   Calcium and vitamin D intake and regular weightbearing exercise         Drew Cheatham MD

## 2019-11-22 NOTE — PATIENT INSTRUCTIONS

## 2019-11-22 NOTE — PROGRESS NOTES
Assessment/Plan:         Diagnoses and all orders for this visit:    Type 2 diabetes mellitus without complication, without long-term current use of insulin (HCC)  -     Hemoglobin A1C  -     Microalbumin / creatinine urine ratio    Essential hypertension  -     amLODIPine (NORVASC) 10 mg tablet; Take 1 tablet (10 mg total) by mouth daily  -     CBC and differential  -     Comprehensive metabolic panel    Mixed hyperlipidemia  -     Lipid panel    Chronic obstructive pulmonary disease, unspecified COPD type (HCC)    Obstructive sleep apnea    Class 2 severe obesity with serious comorbidity and body mass index (BMI) of 36 0 to 36 9 in adult, unspecified obesity type (HCC)    Peripheral arterial disease (HCC)    Gastroesophageal reflux disease without esophagitis    Kessler's esophagus without dysplasia    Fatty liver    Medicare annual wellness visit, subsequent        Increase fish oil capsules to 4/day  OV 6 months with repeat labs in 6 months  Up to date with flu vaccine  Patient ID: Federico Gonzalez  is a 69 y o  male  Follow up visit  Medications reviewed  Labs 11/2019 see note  Hypertension blood pressures have been stable on current multi drug regimen  Creatinine 1 13  Electrolytes normal  Hgb 14 3  Mixed hyperlipidemia on 2 fish oil capsules a day  11/2019 Lipid profile cholesterol 161, TGs 451 increased from 319  HDL 26  LFTs normal except for AST 49 and  ALT 86 increased from 59  05/2019 TSH 1 52  Type 2 DM on Metformin 500 mg BID    A1c 7 0   05/2019  urine micro albumin 6 4  on ARB  No hypoglycemic events  Mild neuropathy symptoms numbness no pain   Current with eye exam        The following portions of the patient's history were reviewed and updated as appropriate: allergies, current medications, past family history, past medical history, past social history, past surgical history and problem list     Review of Systems   Constitutional: Negative for appetite change, chills, fever and unexpected weight change  HENT: Negative for congestion, ear pain, rhinorrhea, sinus pain, sore throat and trouble swallowing          + chronic nasal congestion  Eyes: Negative for visual disturbance  S/p cataract surgery OS   Respiratory: Negative for cough, shortness of breath and wheezing  Asthma stable on Advair once a day  FIOR on CPAP with auto adjustment feature  Cardiovascular: Negative for chest pain, palpitations and leg swelling  Admission 08/2015 for right-sided chest pain associated with increased shortness of breath and accelerated hypertension  He ruled out for MI  EKG no acute EKG changes  nuclear stress test normal  no perfusion abnormalities  EF 67%  He was seen by nephrology  workup for secondary causes of hypertension metanephrine normal at 47  normetanephrine elevated at 183  renin 19 27  aldosterone 2 2 renal artery dopplers 03/2015 no ISIAH  kidneys hypertrophic  2 8 cm cyst left kidney  intermittent leg claudication and PAD no rest pain  pain left hip and calf pain after walking long distances  11/2016 arterial dopplers LEs  Diffuse heterogeneous plaque throughout both legs  Evidence of right lower extremity occlusive disease in mid SFA  aortoiliac study normal but limited views of proximal common iliac arteries due to body habitus   Gastrointestinal: Negative for abdominal pain, blood in stool, constipation, diarrhea, nausea and vomiting  GERD stable on Omeprazole  no dysphagia  EGD 12/2018  HH and Kessler's   colonoscopy 06/2014  1 colon polyp descending colon, diverticulosis and internal hemorrhoids  history of fatty liver 05/2019 LFTs normal except for ALT 59     01/2018 abdominal ultrasound normal except for mild diffuse fatty infiltration of liver   Endocrine: Negative for polydipsia and polyuria  Genitourinary: Negative for difficulty urinating  05/2019 PSA 1 47   Musculoskeletal: Negative for arthralgias and myalgias     Skin: Negative for rash    Allergic/Immunologic: Negative for environmental allergies  Neurological: Negative for dizziness, weakness, light-headedness and headaches  Hematological: Negative for adenopathy  Does not bruise/bleed easily  Psychiatric/Behavioral: Negative for dysphoric mood and sleep disturbance  Objective:      /60   Pulse 56   Temp 97 6 °F (36 4 °C)   Resp 16   Ht 5' 7 5" (1 715 m)   Wt 108 kg (239 lb)   BMI 36 88 kg/m²          Physical Exam   Constitutional: He is oriented to person, place, and time  He appears well-developed and well-nourished  No distress  HENT:   Right Ear: Tympanic membrane normal    Left Ear: Tympanic membrane normal    Mouth/Throat: Oropharynx is clear and moist    Eyes: Pupils are equal, round, and reactive to light  Conjunctivae and EOM are normal  No scleral icterus  Neck: No JVD present  Carotid bruit is not present  No tracheal deviation present  No thyroid mass and no thyromegaly present  Cardiovascular: Normal rate, regular rhythm and normal heart sounds  Exam reveals no gallop  No murmur heard  Pulses:       Carotid pulses are 2+ on the right side, and 2+ on the left side  Pulmonary/Chest: Effort normal and breath sounds normal  No respiratory distress  He has no wheezes  He has no rales  Abdominal: Soft  Bowel sounds are normal  He exhibits no distension, no abdominal bruit and no mass  There is no hepatosplenomegaly  There is no tenderness  There is no rebound and no guarding  Musculoskeletal: Normal range of motion  He exhibits no edema  Lymphadenopathy:     He has no cervical adenopathy  Neurological: He is alert and oriented to person, place, and time  No cranial nerve deficit  Skin: No rash noted  No cyanosis  Nails show no clubbing  Psychiatric: He has a normal mood and affect  Nursing note and vitals reviewed

## 2019-12-19 DIAGNOSIS — E11.9 TYPE 2 DIABETES MELLITUS WITHOUT COMPLICATION, WITHOUT LONG-TERM CURRENT USE OF INSULIN (HCC): ICD-10-CM

## 2019-12-19 NOTE — TELEPHONE ENCOUNTER
rx sent to  for approval   When filled in June, quantity was incorrect, so I cancelled rx and resent it correctly

## 2019-12-19 NOTE — TELEPHONE ENCOUNTER
Patient called requesting refill on Metformin 500 mg 180 tabs with refills  He stated last time it was sent as 90 tabs and its suppose to be 180 tabs   [facility] [employer]

## 2020-01-08 DIAGNOSIS — J44.9 CHRONIC OBSTRUCTIVE PULMONARY DISEASE, UNSPECIFIED COPD TYPE (HCC): ICD-10-CM

## 2020-02-17 ENCOUNTER — TRANSCRIBE ORDERS (OUTPATIENT)
Dept: ADMINISTRATIVE | Facility: HOSPITAL | Age: 75
End: 2020-02-17

## 2020-02-17 DIAGNOSIS — R10.9 ACUTE ABDOMINAL PAIN: Primary | ICD-10-CM

## 2020-02-27 ENCOUNTER — HOSPITAL ENCOUNTER (OUTPATIENT)
Dept: RADIOLOGY | Facility: MEDICAL CENTER | Age: 75
Discharge: HOME | End: 2020-02-27
Payer: COMMERCIAL

## 2020-02-27 DIAGNOSIS — R10.9 ACUTE ABDOMINAL PAIN: ICD-10-CM

## 2020-02-27 PROCEDURE — 74177 CT ABD & PELVIS W/CONTRAST: CPT

## 2020-02-27 RX ADMIN — IOHEXOL 100 ML: 350 INJECTION, SOLUTION INTRAVENOUS at 10:15

## 2020-03-05 DIAGNOSIS — I10 ESSENTIAL HYPERTENSION: ICD-10-CM

## 2020-03-05 RX ORDER — CARVEDILOL 25 MG/1
25 TABLET ORAL 2 TIMES DAILY
Qty: 180 TABLET | Refills: 3 | Status: SHIPPED | OUTPATIENT
Start: 2020-03-05 | End: 2021-03-10 | Stop reason: SDUPTHER

## 2020-03-05 NOTE — TELEPHONE ENCOUNTER
Patient called requesting refill on Carcedilol 25 mg 90 day with refill [id number] [place]

## 2020-03-09 DIAGNOSIS — I10 ESSENTIAL HYPERTENSION: ICD-10-CM

## 2020-03-09 RX ORDER — CHLORTHALIDONE 25 MG/1
37.5 TABLET ORAL DAILY
Qty: 135 TABLET | Refills: 3 | Status: SHIPPED | OUTPATIENT
Start: 2020-03-09 | End: 2021-04-05 | Stop reason: SDUPTHER

## 2020-03-16 DIAGNOSIS — I10 ESSENTIAL HYPERTENSION: ICD-10-CM

## 2020-03-16 RX ORDER — VALSARTAN 320 MG/1
320 TABLET ORAL DAILY
Qty: 90 TABLET | Refills: 3 | Status: SHIPPED | OUTPATIENT
Start: 2020-03-16 | End: 2020-03-18 | Stop reason: SDUPTHER

## 2020-03-18 DIAGNOSIS — I10 ESSENTIAL HYPERTENSION: ICD-10-CM

## 2020-03-18 PROCEDURE — 4010F ACE/ARB THERAPY RXD/TAKEN: CPT | Performed by: FAMILY MEDICINE

## 2020-03-18 RX ORDER — VALSARTAN 320 MG/1
320 TABLET ORAL DAILY
Qty: 90 TABLET | Refills: 3 | Status: SHIPPED | OUTPATIENT
Start: 2020-03-18 | End: 2021-02-17 | Stop reason: SDUPTHER

## 2020-03-18 NOTE — TELEPHONE ENCOUNTER
Patient is asking for a 90 day supply of Valsartan 320 mg to go to [address] [place] [address] [id number]  He called [facility] and they have valsartan available  Mail order was on back order

## 2020-05-26 ENCOUNTER — APPOINTMENT (OUTPATIENT)
Dept: LAB | Facility: MEDICAL CENTER | Age: 75
End: 2020-05-26
Payer: COMMERCIAL

## 2020-05-26 LAB
ALBUMIN SERPL BCP-MCNC: 4 G/DL (ref 3.5–5)
ALP SERPL-CCNC: 47 U/L (ref 46–116)
ALT SERPL W P-5'-P-CCNC: 72 U/L (ref 12–78)
ANION GAP SERPL CALCULATED.3IONS-SCNC: 6 MMOL/L (ref 4–13)
AST SERPL W P-5'-P-CCNC: 39 U/L (ref 5–45)
BASOPHILS # BLD AUTO: 0.08 THOUSANDS/ΜL (ref 0–0.1)
BASOPHILS NFR BLD AUTO: 1 % (ref 0–1)
BILIRUB SERPL-MCNC: 0.6 MG/DL (ref 0.2–1)
BUN SERPL-MCNC: 26 MG/DL (ref 5–25)
CALCIUM SERPL-MCNC: 10 MG/DL (ref 8.3–10.1)
CHLORIDE SERPL-SCNC: 103 MMOL/L (ref 100–108)
CHOLEST SERPL-MCNC: 177 MG/DL (ref 50–200)
CO2 SERPL-SCNC: 26 MMOL/L (ref 21–32)
CREAT SERPL-MCNC: 1.12 MG/DL (ref 0.6–1.3)
CREAT UR-MCNC: 68.5 MG/DL
EOSINOPHIL # BLD AUTO: 0.39 THOUSAND/ΜL (ref 0–0.61)
EOSINOPHIL NFR BLD AUTO: 6 % (ref 0–6)
ERYTHROCYTE [DISTWIDTH] IN BLOOD BY AUTOMATED COUNT: 13.8 % (ref 11.6–15.1)
EST. AVERAGE GLUCOSE BLD GHB EST-MCNC: 151 MG/DL
GFR SERPL CREATININE-BSD FRML MDRD: 65 ML/MIN/1.73SQ M
GLUCOSE P FAST SERPL-MCNC: 152 MG/DL (ref 65–99)
HBA1C MFR BLD: 6.9 %
HCT VFR BLD AUTO: 40.9 % (ref 36.5–49.3)
HDLC SERPL-MCNC: 33 MG/DL
HGB BLD-MCNC: 13.7 G/DL (ref 12–17)
IMM GRANULOCYTES # BLD AUTO: 0.05 THOUSAND/UL (ref 0–0.2)
IMM GRANULOCYTES NFR BLD AUTO: 1 % (ref 0–2)
LDLC SERPL CALC-MCNC: 67 MG/DL (ref 0–100)
LYMPHOCYTES # BLD AUTO: 1.78 THOUSANDS/ΜL (ref 0.6–4.47)
LYMPHOCYTES NFR BLD AUTO: 28 % (ref 14–44)
MCH RBC QN AUTO: 28.4 PG (ref 26.8–34.3)
MCHC RBC AUTO-ENTMCNC: 33.5 G/DL (ref 31.4–37.4)
MCV RBC AUTO: 85 FL (ref 82–98)
MICROALBUMIN UR-MCNC: 42.4 MG/L (ref 0–20)
MICROALBUMIN/CREAT 24H UR: 62 MG/G CREATININE (ref 0–30)
MONOCYTES # BLD AUTO: 0.36 THOUSAND/ΜL (ref 0.17–1.22)
MONOCYTES NFR BLD AUTO: 6 % (ref 4–12)
NEUTROPHILS # BLD AUTO: 3.8 THOUSANDS/ΜL (ref 1.85–7.62)
NEUTS SEG NFR BLD AUTO: 58 % (ref 43–75)
NONHDLC SERPL-MCNC: 144 MG/DL
NRBC BLD AUTO-RTO: 0 /100 WBCS
PLATELET # BLD AUTO: 158 THOUSANDS/UL (ref 149–390)
PMV BLD AUTO: 12.4 FL (ref 8.9–12.7)
POTASSIUM SERPL-SCNC: 3.8 MMOL/L (ref 3.5–5.3)
PROT SERPL-MCNC: 7.7 G/DL (ref 6.4–8.2)
RBC # BLD AUTO: 4.82 MILLION/UL (ref 3.88–5.62)
SODIUM SERPL-SCNC: 135 MMOL/L (ref 136–145)
TRIGL SERPL-MCNC: 383 MG/DL
WBC # BLD AUTO: 6.46 THOUSAND/UL (ref 4.31–10.16)

## 2020-05-26 PROCEDURE — 36415 COLL VENOUS BLD VENIPUNCTURE: CPT | Performed by: FAMILY MEDICINE

## 2020-05-26 PROCEDURE — 85025 COMPLETE CBC W/AUTO DIFF WBC: CPT | Performed by: FAMILY MEDICINE

## 2020-05-26 PROCEDURE — 80061 LIPID PANEL: CPT | Performed by: FAMILY MEDICINE

## 2020-05-26 PROCEDURE — 80053 COMPREHEN METABOLIC PANEL: CPT | Performed by: FAMILY MEDICINE

## 2020-05-26 PROCEDURE — 83036 HEMOGLOBIN GLYCOSYLATED A1C: CPT | Performed by: FAMILY MEDICINE

## 2020-05-26 PROCEDURE — 3044F HG A1C LEVEL LT 7.0%: CPT | Performed by: FAMILY MEDICINE

## 2020-05-26 PROCEDURE — 3060F POS MICROALBUMINURIA REV: CPT | Performed by: FAMILY MEDICINE

## 2020-05-26 PROCEDURE — 82043 UR ALBUMIN QUANTITATIVE: CPT | Performed by: FAMILY MEDICINE

## 2020-05-26 PROCEDURE — 82570 ASSAY OF URINE CREATININE: CPT | Performed by: FAMILY MEDICINE

## 2020-06-02 ENCOUNTER — OFFICE VISIT (OUTPATIENT)
Dept: FAMILY MEDICINE CLINIC | Facility: CLINIC | Age: 75
End: 2020-06-02
Payer: COMMERCIAL

## 2020-06-02 VITALS
TEMPERATURE: 96.6 F | BODY MASS INDEX: 36.53 KG/M2 | HEART RATE: 60 BPM | SYSTOLIC BLOOD PRESSURE: 132 MMHG | RESPIRATION RATE: 16 BRPM | HEIGHT: 68 IN | WEIGHT: 241 LBS | DIASTOLIC BLOOD PRESSURE: 66 MMHG

## 2020-06-02 DIAGNOSIS — J44.9 CHRONIC OBSTRUCTIVE PULMONARY DISEASE, UNSPECIFIED COPD TYPE (HCC): ICD-10-CM

## 2020-06-02 DIAGNOSIS — E78.2 MIXED HYPERLIPIDEMIA: ICD-10-CM

## 2020-06-02 DIAGNOSIS — I25.10 CORONARY ARTERY CALCIFICATION SEEN ON CT SCAN: ICD-10-CM

## 2020-06-02 DIAGNOSIS — I10 ESSENTIAL HYPERTENSION: ICD-10-CM

## 2020-06-02 DIAGNOSIS — K22.70 BARRETT'S ESOPHAGUS WITHOUT DYSPLASIA: ICD-10-CM

## 2020-06-02 DIAGNOSIS — K76.0 FATTY LIVER: ICD-10-CM

## 2020-06-02 DIAGNOSIS — G47.33 OBSTRUCTIVE SLEEP APNEA: ICD-10-CM

## 2020-06-02 DIAGNOSIS — E11.42 TYPE 2 DIABETES MELLITUS WITH DIABETIC POLYNEUROPATHY, WITHOUT LONG-TERM CURRENT USE OF INSULIN (HCC): Primary | ICD-10-CM

## 2020-06-02 DIAGNOSIS — K21.9 GASTROESOPHAGEAL REFLUX DISEASE WITHOUT ESOPHAGITIS: ICD-10-CM

## 2020-06-02 DIAGNOSIS — I73.9 PERIPHERAL ARTERIAL DISEASE (HCC): ICD-10-CM

## 2020-06-02 DIAGNOSIS — R80.9 MICROALBUMINURIA DUE TO TYPE 2 DIABETES MELLITUS (HCC): ICD-10-CM

## 2020-06-02 DIAGNOSIS — E11.29 MICROALBUMINURIA DUE TO TYPE 2 DIABETES MELLITUS (HCC): ICD-10-CM

## 2020-06-02 PROCEDURE — 4040F PNEUMOC VAC/ADMIN/RCVD: CPT | Performed by: FAMILY MEDICINE

## 2020-06-02 PROCEDURE — 1036F TOBACCO NON-USER: CPT | Performed by: FAMILY MEDICINE

## 2020-06-02 PROCEDURE — 99214 OFFICE O/P EST MOD 30 MIN: CPT | Performed by: FAMILY MEDICINE

## 2020-06-02 PROCEDURE — 1160F RVW MEDS BY RX/DR IN RCRD: CPT | Performed by: FAMILY MEDICINE

## 2020-06-02 PROCEDURE — 3066F NEPHROPATHY DOC TX: CPT | Performed by: FAMILY MEDICINE

## 2020-06-02 PROCEDURE — 3008F BODY MASS INDEX DOCD: CPT | Performed by: FAMILY MEDICINE

## 2020-06-02 PROCEDURE — 3078F DIAST BP <80 MM HG: CPT | Performed by: FAMILY MEDICINE

## 2020-06-02 PROCEDURE — 3044F HG A1C LEVEL LT 7.0%: CPT | Performed by: FAMILY MEDICINE

## 2020-06-02 PROCEDURE — 3060F POS MICROALBUMINURIA REV: CPT | Performed by: FAMILY MEDICINE

## 2020-06-02 PROCEDURE — 3075F SYST BP GE 130 - 139MM HG: CPT | Performed by: FAMILY MEDICINE

## 2020-06-02 RX ORDER — ATORVASTATIN CALCIUM 40 MG/1
40 TABLET, FILM COATED ORAL DAILY
Qty: 90 TABLET | Refills: 3 | Status: SHIPPED | OUTPATIENT
Start: 2020-06-02 | End: 2021-05-27

## 2020-09-21 ENCOUNTER — TELEMEDICINE (OUTPATIENT)
Dept: FAMILY MEDICINE CLINIC | Facility: CLINIC | Age: 75
End: 2020-09-21
Payer: COMMERCIAL

## 2020-09-21 DIAGNOSIS — R10.9 ABDOMINAL PRESSURE: ICD-10-CM

## 2020-09-21 DIAGNOSIS — Z12.5 SCREENING FOR PROSTATE CANCER: Primary | ICD-10-CM

## 2020-09-21 PROCEDURE — 99214 OFFICE O/P EST MOD 30 MIN: CPT | Performed by: PHYSICIAN ASSISTANT

## 2020-09-21 NOTE — PROGRESS NOTES
Virtual Brief Visit    Assessment/Plan:    Problem List Items Addressed This Visit     None      Visit Diagnoses     Screening for prostate cancer    -  Primary    Relevant Orders    PSA, Total Screen    Abdominal pressure          Discussed possible etiology including inguinal hernias, prostatitis, diverticulitis  Seen at [facility] 7 months prior for similar pain  CT (2/2020) showed incidental prostate enlargement , small fat containing umbilical hernia and inguinal hernias  Discussed possible Adverse drug effects with statins  Discussed unlikely for statins to be increasing his BP  - Patient will try 2 week drug holiday from lipitor  - Ordered repeat PSA test  PSA of 1 4 (5/2019)  Patient will complete previously ordered A1c, CMP, CBC, lipid panel and microalbumin creatinine ratio  Reason for visit is   Chief Complaint   Patient presents with    Virtual Brief Visit        Encounter provider Karis Jim PA-C    Provider located at [place] [address]  [phone]    Recent Visits  No visits were found meeting these conditions  Showing recent visits within past 7 days and meeting all other requirements     Today's Visits  Date Type Provider Dept   09/21/20 Telemedicine Karis Jim PA-C [facility]   Showing today's visits and meeting all other requirements     Future Appointments  No visits were found meeting these conditions  Showing future appointments within next 150 days and meeting all other requirements        After connecting through telephone, the patient was identified by name and date of birth  Chinmay Fields  was informed that this is a telemedicine visit and that the visit is being conducted through telephone  My office door was closed  No one else was in the room  He acknowledged consent and understanding of privacy and security of the platform   The patient has agreed to participate and understands he can discontinue the visit at any time  Patient is aware this is a billable service  Subjective    Chuck Cho  is a 70 y o  male for Intermittent lower abdominal pressure for 3 weeks  Worse with standing, resolved with sitting  The pressure radiates into back and hips  Occasinally feeling SOB during same 3 weeks  Denies any burning with urination, odor or blood  No change in BM, blood in stool or coffee ground appearance  He is concerned with his prostate due to a recent CT showing an enlarged prostate  He is requesting PSA test to be completed with his labwork  3 days ago he stopped Lipitor and the pressure/pain has largely resolved  BP today of 140/78, Previously elevated BP reading in 180s/80s         Past Medical History:   Diagnosis Date    Accelerated essential hypertension     LAST ASSESSED 8/27/15    Benign positional vertigo 8/5/2016    Cataract, left eye 10/24/2017    Diverticulosis     Lumbar radiculopathy 12/7/2016    Polyp of colon 6/5/2017    Overview:  Added automatically from request for surgery [id number]    Prostatitis     LAST ASSESSED 3/17/15       Past Surgical History:   Procedure Laterality Date    CARDIOVASCULAR STRESS TEST         Current Outpatient Medications   Medication Sig Dispense Refill    amLODIPine (NORVASC) 10 mg tablet Take 1 tablet (10 mg total) by mouth daily 90 tablet 3    aspirin (ECOTRIN LOW STRENGTH) 81 mg EC tablet Take 81 mg by mouth daily      atorvastatin (LIPITOR) 40 mg tablet Take 1 tablet (40 mg total) by mouth daily 90 tablet 3    carvedilol (COREG) 25 mg tablet Take 1 tablet (25 mg total) by mouth 2 (two) times a day 180 tablet 3    chlorthalidone 25 mg tablet Take 1 5 tablets (37 5 mg total) by mouth daily 135 tablet 3    Cholecalciferol (VITAMIN D3) 1000 UNIT/SPRAY LIQD Take by mouth      Cyanocobalamin (VITAMIN B-12) 1000 MCG/15ML LIQD Take 1 tablet by mouth daily      fluticasone-salmeterol (ADVAIR DISKUS) 250-50 mcg/dose inhaler Inhale 1 puff 2 (two) times a day 3 each 3    Folic Acid 20 MG CAPS Take 1 capsule by mouth daily      metFORMIN (GLUCOPHAGE) 500 mg tablet Take 1 tablet (500 mg total) by mouth 2 (two) times a day 180 tablet 3    Omega-3 Fatty Acids (FISH OIL) 1,000 mg Take 1 capsule by mouth daily      omeprazole (PriLOSEC) 40 MG capsule Take 1 capsule by mouth 2 (two) times a day      polyethylene glycol-propylene glycol (SYSTANE ULTRA) 0 4-0 3 % Apply to eye      triamcinolone (KENALOG) 0 1 % cream Apply topically 2 (two) times a day 80 g 3    valsartan (DIOVAN) 320 MG tablet Take 1 tablet (320 mg total) by mouth daily 90 tablet 3     No current facility-administered medications for this visit  No Known Allergies    Review of Systems   Constitutional: Negative for activity change, fatigue, fever and unexpected weight change  HENT: Negative for rhinorrhea and sore throat  Respiratory: Negative for cough, shortness of breath and wheezing  Cardiovascular: Negative for chest pain, palpitations and leg swelling  Gastrointestinal: Negative for abdominal pain, constipation, diarrhea, nausea and vomiting  Genitourinary: Negative for decreased urine volume, discharge, dysuria, frequency, hematuria, penile pain, testicular pain and urgency  Musculoskeletal: Negative for back pain, neck pain and neck stiffness  Skin: Negative for rash  There were no vitals filed for this visit  I spent 20 minutes directly with the patient during this visit    [id number]  acknowledges that he has consented to an online visit or consultation  He understands that the online visit is based solely on information provided by him, and that, in the absence of a face-to-face physical evaluation by the physician, the diagnosis he receives is both limited and provisional in terms of accuracy and completeness   This is not intended to replace a full medical face-to-face evaluation by the physician  Bud Loo  understands and accepts these terms

## 2020-09-22 ENCOUNTER — APPOINTMENT (OUTPATIENT)
Dept: LAB | Facility: MEDICAL CENTER | Age: 75
End: 2020-09-22
Payer: COMMERCIAL

## 2020-09-22 DIAGNOSIS — Z12.5 SCREENING FOR PROSTATE CANCER: ICD-10-CM

## 2020-09-22 LAB
ALBUMIN SERPL BCP-MCNC: 4 G/DL (ref 3.5–5)
ALP SERPL-CCNC: 55 U/L (ref 46–116)
ALT SERPL W P-5'-P-CCNC: 70 U/L (ref 12–78)
ANION GAP SERPL CALCULATED.3IONS-SCNC: 10 MMOL/L (ref 4–13)
AST SERPL W P-5'-P-CCNC: 40 U/L (ref 5–45)
BASOPHILS # BLD AUTO: 0.1 THOUSANDS/ΜL (ref 0–0.1)
BASOPHILS NFR BLD AUTO: 1 % (ref 0–1)
BILIRUB SERPL-MCNC: 0.66 MG/DL (ref 0.2–1)
BUN SERPL-MCNC: 25 MG/DL (ref 5–25)
CALCIUM SERPL-MCNC: 10.3 MG/DL (ref 8.3–10.1)
CHLORIDE SERPL-SCNC: 101 MMOL/L (ref 100–108)
CHOLEST SERPL-MCNC: 146 MG/DL (ref 50–200)
CO2 SERPL-SCNC: 28 MMOL/L (ref 21–32)
CREAT SERPL-MCNC: 1.18 MG/DL (ref 0.6–1.3)
CREAT UR-MCNC: 116 MG/DL
EOSINOPHIL # BLD AUTO: 0.51 THOUSAND/ΜL (ref 0–0.61)
EOSINOPHIL NFR BLD AUTO: 6 % (ref 0–6)
ERYTHROCYTE [DISTWIDTH] IN BLOOD BY AUTOMATED COUNT: 14.1 % (ref 11.6–15.1)
EST. AVERAGE GLUCOSE BLD GHB EST-MCNC: 252 MG/DL
GFR SERPL CREATININE-BSD FRML MDRD: 61 ML/MIN/1.73SQ M
GLUCOSE P FAST SERPL-MCNC: 219 MG/DL (ref 65–99)
HBA1C MFR BLD: 10.4 %
HCT VFR BLD AUTO: 42.3 % (ref 36.5–49.3)
HDLC SERPL-MCNC: 36 MG/DL
HGB BLD-MCNC: 13.6 G/DL (ref 12–17)
IMM GRANULOCYTES # BLD AUTO: 0.08 THOUSAND/UL (ref 0–0.2)
IMM GRANULOCYTES NFR BLD AUTO: 1 % (ref 0–2)
LDLC SERPL CALC-MCNC: 42 MG/DL (ref 0–100)
LYMPHOCYTES # BLD AUTO: 2.48 THOUSANDS/ΜL (ref 0.6–4.47)
LYMPHOCYTES NFR BLD AUTO: 31 % (ref 14–44)
MCH RBC QN AUTO: 28.5 PG (ref 26.8–34.3)
MCHC RBC AUTO-ENTMCNC: 32.2 G/DL (ref 31.4–37.4)
MCV RBC AUTO: 89 FL (ref 82–98)
MICROALBUMIN UR-MCNC: 172 MG/L (ref 0–20)
MICROALBUMIN/CREAT 24H UR: 148 MG/G CREATININE (ref 0–30)
MONOCYTES # BLD AUTO: 0.51 THOUSAND/ΜL (ref 0.17–1.22)
MONOCYTES NFR BLD AUTO: 6 % (ref 4–12)
NEUTROPHILS # BLD AUTO: 4.32 THOUSANDS/ΜL (ref 1.85–7.62)
NEUTS SEG NFR BLD AUTO: 55 % (ref 43–75)
NONHDLC SERPL-MCNC: 110 MG/DL
NRBC BLD AUTO-RTO: 0 /100 WBCS
PLATELET # BLD AUTO: 167 THOUSANDS/UL (ref 149–390)
PMV BLD AUTO: 12.6 FL (ref 8.9–12.7)
POTASSIUM SERPL-SCNC: 4.5 MMOL/L (ref 3.5–5.3)
PROT SERPL-MCNC: 7.9 G/DL (ref 6.4–8.2)
PSA SERPL-MCNC: 1.5 NG/ML (ref 0–4)
RBC # BLD AUTO: 4.77 MILLION/UL (ref 3.88–5.62)
SODIUM SERPL-SCNC: 139 MMOL/L (ref 136–145)
TRIGL SERPL-MCNC: 339 MG/DL
WBC # BLD AUTO: 8 THOUSAND/UL (ref 4.31–10.16)

## 2020-09-22 PROCEDURE — G0103 PSA SCREENING: HCPCS

## 2020-09-22 PROCEDURE — 36415 COLL VENOUS BLD VENIPUNCTURE: CPT | Performed by: FAMILY MEDICINE

## 2020-09-22 PROCEDURE — 80053 COMPREHEN METABOLIC PANEL: CPT | Performed by: FAMILY MEDICINE

## 2020-09-22 PROCEDURE — 82043 UR ALBUMIN QUANTITATIVE: CPT | Performed by: FAMILY MEDICINE

## 2020-09-22 PROCEDURE — 85025 COMPLETE CBC W/AUTO DIFF WBC: CPT | Performed by: FAMILY MEDICINE

## 2020-09-22 PROCEDURE — 80061 LIPID PANEL: CPT | Performed by: FAMILY MEDICINE

## 2020-09-22 PROCEDURE — 3046F HEMOGLOBIN A1C LEVEL >9.0%: CPT | Performed by: PHYSICIAN ASSISTANT

## 2020-09-22 PROCEDURE — 82570 ASSAY OF URINE CREATININE: CPT | Performed by: FAMILY MEDICINE

## 2020-09-22 PROCEDURE — 83036 HEMOGLOBIN GLYCOSYLATED A1C: CPT | Performed by: FAMILY MEDICINE

## 2020-09-22 PROCEDURE — 3060F POS MICROALBUMINURIA REV: CPT | Performed by: PHYSICIAN ASSISTANT

## 2020-09-24 ENCOUNTER — TELEPHONE (OUTPATIENT)
Dept: FAMILY MEDICINE CLINIC | Facility: CLINIC | Age: 75
End: 2020-09-24

## 2020-09-24 NOTE — TELEPHONE ENCOUNTER
Patient called back and I gave message  Patient did move up his apt to November  He stated he did have a lot of sugar in the last three months  He stated can he try to cut out sugar for the next couple of months instead of increasing dosage or does he have to increase  Please advise  He stated he wanted to try and bring that number down on his own

## 2020-09-24 NOTE — TELEPHONE ENCOUNTER
36+3    Presenting for NST and BP check for concerns of developing pre-eclampsia. BP normal today. Asymptomatic, denies headache, blurry vision, shortness of breath, epigastric pain.  Labs on 4/28 significant for protein:creatinine 1,075, other labs within normal limits with Hgb 11.9, plt 210, Cr 0.71    NST reactive and reassuring today.    RTC on Tuesday. Discussed if her BP were to be elevated at that time would recommend induction of labor    Rachelle Magaña MD    Call re labs  His A1c increased from 6 9 to 10 4-goal less than 7 0  he is on Metformin 500 mg BID  Dose of Metformin can be increased to 1,000 mg BID with food and/or add a 2nd medication for his diabetes  He has an appt 12/2019  This could be moved up if he wants to go over treatment options       Recent Results (from the past 168 hour(s))   CBC and differential    Collection Time: 09/22/20  7:09 AM   Result Value Ref Range    WBC 8 00 4 31 - 10 16 Thousand/uL    RBC 4 77 3 88 - 5 62 Million/uL    Hemoglobin 13 6 12 0 - 17 0 g/dL    Hematocrit 42 3 36 5 - 49 3 %    MCV 89 82 - 98 fL    MCH 28 5 26 8 - 34 3 pg    MCHC 32 2 31 4 - 37 4 g/dL    RDW 14 1 11 6 - 15 1 %    MPV 12 6 8 9 - 12 7 fL    Platelets 478 763 - 346 Thousands/uL    nRBC 0 /100 WBCs    Neutrophils Relative 55 43 - 75 %    Immat GRANS % 1 0 - 2 %    Lymphocytes Relative 31 14 - 44 %    Monocytes Relative 6 4 - 12 %    Eosinophils Relative 6 0 - 6 %    Basophils Relative 1 0 - 1 %    Neutrophils Absolute 4 32 1 85 - 7 62 Thousands/µL    Immature Grans Absolute 0 08 0 00 - 0 20 Thousand/uL    Lymphocytes Absolute 2 48 0 60 - 4 47 Thousands/µL    Monocytes Absolute 0 51 0 17 - 1 22 Thousand/µL    Eosinophils Absolute 0 51 0 00 - 0 61 Thousand/µL    Basophils Absolute 0 10 0 00 - 0 10 Thousands/µL   Comprehensive metabolic panel    Collection Time: 09/22/20  7:09 AM   Result Value Ref Range    Sodium 139 136 - 145 mmol/L    Potassium 4 5 3 5 - 5 3 mmol/L    Chloride 101 100 - 108 mmol/L    CO2 28 21 - 32 mmol/L    ANION GAP 10 4 - 13 mmol/L    BUN 25 5 - 25 mg/dL    Creatinine 1 18 0 60 - 1 30 mg/dL    Glucose, Fasting 219 (H) 65 - 99 mg/dL    Calcium 10 3 (H) 8 3 - 10 1 mg/dL    AST 40 5 - 45 U/L    ALT 70 12 - 78 U/L    Alkaline Phosphatase 55 46 - 116 U/L    Total Protein 7 9 6 4 - 8 2 g/dL    Albumin 4 0 3 5 - 5 0 g/dL    Total Bilirubin 0 66 0 20 - 1 00 mg/dL    eGFR 61 ml/min/1 73sq m   Hemoglobin A1C    Collection Time: 09/22/20  7:09 AM   Result Value Ref Range    Hemoglobin A1C 10 4 (H) Normal 3 8-5 6%; PreDiabetic 5 7-6 4%; Diabetic >=6 5%; Glycemic control for adults with diabetes <7 0% %     mg/dl   Lipid panel    Collection Time: 09/22/20  7:09 AM   Result Value Ref Range    Cholesterol 146 50 - 200 mg/dL    Triglycerides 339 (H) <=150 mg/dL    HDL, Direct 36 (L) >=40 mg/dL    LDL Calculated 42 0 - 100 mg/dL    Non-HDL-Chol (CHOL-HDL) 110 mg/dl   Microalbumin / creatinine urine ratio    Collection Time: 09/22/20  7:09 AM   Result Value Ref Range    Creatinine, Ur 116 0 mg/dL    Microalbum  ,U,Random 172 0 (H) 0 0 - 20 0 mg/L    Microalb Creat Ratio 148 (H) 0 - 30 mg/g creatinine   PSA, Total Screen    Collection Time: 09/22/20  7:09 AM   Result Value Ref Range    PSA 1 5 0 0 - 4 0 ng/mL     Hemoglobin A1C   Date Value Ref Range Status   09/22/2020 10 4 (H) Normal 3 8-5 6%; PreDiabetic 5 7-6 4%; Diabetic >=6 5%; Glycemic control for adults with diabetes <7 0% % Final   05/26/2020 6 9 (H) Normal 3 8-5 6%; PreDiabetic 5 7-6 4%;  Diabetic >=6 5%; Glycemic control for adults with diabetes <7 0% % Final   11/13/2019 7 0  Final   05/10/2019 7 0 (H) <5 7 % Final     Comment:     Reference Range  Non-diabetic                     <5 7  Pre-diabetic                     5 7-6 4  Diabetic                         >=6 5  ADA target for diabetic control  <=7   05/10/2019 7 0  Final   11/12/2018 6 8 (H) <5 7 % Final     Comment:     Reference Range  Non-diabetic                     <5 7  Pre-diabetic                     5 7-6 4  Diabetic                         >=6 5  ADA target for diabetic control  <=7   11/12/2018 6 8  Final   04/27/2018 6 5 (H) <5 7 % Final     Comment:     Reference Range  Non-diabetic                     <5 7  Pre-diabetic                     5 7-6 4  Diabetic                         >=6 5  ADA target for diabetic control  <=7       Current Outpatient Medications:     amLODIPine (NORVASC) 10 mg tablet, Take 1 tablet (10 mg total) by mouth daily, Disp: 90 tablet, Rfl: 3    aspirin (ECOTRIN LOW STRENGTH) 81 mg EC tablet, Take 81 mg by mouth daily, Disp: , Rfl:     atorvastatin (LIPITOR) 40 mg tablet, Take 1 tablet (40 mg total) by mouth daily, Disp: 90 tablet, Rfl: 3    carvedilol (COREG) 25 mg tablet, Take 1 tablet (25 mg total) by mouth 2 (two) times a day, Disp: 180 tablet, Rfl: 3    chlorthalidone 25 mg tablet, Take 1 5 tablets (37 5 mg total) by mouth daily, Disp: 135 tablet, Rfl: 3    Cholecalciferol (VITAMIN D3) 1000 UNIT/SPRAY LIQD, Take by mouth, Disp: , Rfl:     Cyanocobalamin (VITAMIN B-12) 1000 MCG/15ML LIQD, Take 1 tablet by mouth daily, Disp: , Rfl:     fluticasone-salmeterol (ADVAIR DISKUS) 250-50 mcg/dose inhaler, Inhale 1 puff 2 (two) times a day, Disp: 3 each, Rfl: 3    Folic Acid 20 MG CAPS, Take 1 capsule by mouth daily, Disp: , Rfl:     metFORMIN (GLUCOPHAGE) 500 mg tablet, Take 1 tablet (500 mg total) by mouth 2 (two) times a day, Disp: 180 tablet, Rfl: 3    Omega-3 Fatty Acids (FISH OIL) 1,000 mg, Take 1 capsule by mouth daily, Disp: , Rfl:     omeprazole (PriLOSEC) 40 MG capsule, Take 1 capsule by mouth 2 (two) times a day, Disp: , Rfl:     polyethylene glycol-propylene glycol (SYSTANE ULTRA) 0 4-0 3 %, Apply to eye, Disp: , Rfl:     triamcinolone (KENALOG) 0 1 % cream, Apply topically 2 (two) times a day, Disp: 80 g, Rfl: 3    valsartan (DIOVAN) 320 MG tablet, Take 1 tablet (320 mg total) by mouth daily, Disp: 90 tablet, Rfl: 3

## 2020-09-25 ENCOUNTER — TELEPHONE (OUTPATIENT)
Dept: FAMILY MEDICINE CLINIC | Facility: CLINIC | Age: 75
End: 2020-09-25

## 2020-09-25 DIAGNOSIS — E11.42 TYPE 2 DIABETES MELLITUS WITH DIABETIC POLYNEUROPATHY, WITHOUT LONG-TERM CURRENT USE OF INSULIN (HCC): Primary | ICD-10-CM

## 2020-09-25 NOTE — TELEPHONE ENCOUNTER
Patient has agreed to the increased dose of Metformin to 1000 mg,BID  He states he thought about it and agrees with the change

## 2020-09-29 ENCOUNTER — TELEPHONE (OUTPATIENT)
Dept: FAMILY MEDICINE CLINIC | Facility: CLINIC | Age: 75
End: 2020-09-29

## 2020-09-29 NOTE — TELEPHONE ENCOUNTER
Call he was on Metformin 500 mg BID  His dose was increased to 1,000 mg BID  He can go back on Metformin 500 mg BID and we could add a second medication  He could be referred for diabetic education as well         Lab Results   Component Value Date    HGBA1C 10 4 (H) 09/22/2020

## 2020-09-29 NOTE — TELEPHONE ENCOUNTER
Patient phoned stating since increasing dose of Metformin he has developed diarrhea  So far this morning he took one 500 mg tab  Asked if he should just resume one tablet q d

## 2020-09-29 NOTE — TELEPHONE ENCOUNTER
Spoke with patient, gave message  He will go back on the metformin 500 BID, he does not want to add another medication or see diabetic education  He said he has an appointment with you 11/03/2020 and he wants to try to lower it on his own  He has been "eating anything and everything" and he is going to stop that

## 2020-10-28 ENCOUNTER — VBI (OUTPATIENT)
Dept: ADMINISTRATIVE | Facility: OTHER | Age: 75
End: 2020-10-28

## 2020-11-23 PROCEDURE — 88305 TISSUE EXAM BY PATHOLOGIST: CPT | Performed by: PATHOLOGY

## 2020-11-24 ENCOUNTER — TELEPHONE (OUTPATIENT)
Dept: FAMILY MEDICINE CLINIC | Facility: CLINIC | Age: 75
End: 2020-11-24

## 2020-11-24 DIAGNOSIS — E11.42 TYPE 2 DIABETES MELLITUS WITH DIABETIC POLYNEUROPATHY, WITHOUT LONG-TERM CURRENT USE OF INSULIN (HCC): ICD-10-CM

## 2020-11-25 ENCOUNTER — LAB REQUISITION (OUTPATIENT)
Dept: LAB | Facility: HOSPITAL | Age: 75
End: 2020-11-25
Payer: COMMERCIAL

## 2020-11-25 ENCOUNTER — OFFICE VISIT (OUTPATIENT)
Dept: FAMILY MEDICINE CLINIC | Facility: CLINIC | Age: 75
End: 2020-11-25
Payer: COMMERCIAL

## 2020-11-25 VITALS
BODY MASS INDEX: 36.68 KG/M2 | HEART RATE: 62 BPM | TEMPERATURE: 97.5 F | HEIGHT: 68 IN | SYSTOLIC BLOOD PRESSURE: 118 MMHG | WEIGHT: 242 LBS | DIASTOLIC BLOOD PRESSURE: 64 MMHG | RESPIRATION RATE: 16 BRPM

## 2020-11-25 DIAGNOSIS — K22.70 BARRETT'S ESOPHAGUS WITHOUT DYSPLASIA: ICD-10-CM

## 2020-11-25 DIAGNOSIS — K76.0 FATTY LIVER: ICD-10-CM

## 2020-11-25 DIAGNOSIS — I73.9 PERIPHERAL ARTERIAL DISEASE (HCC): ICD-10-CM

## 2020-11-25 DIAGNOSIS — K21.9 GASTROESOPHAGEAL REFLUX DISEASE WITHOUT ESOPHAGITIS: ICD-10-CM

## 2020-11-25 DIAGNOSIS — E66.01 CLASS 2 SEVERE OBESITY WITH SERIOUS COMORBIDITY AND BODY MASS INDEX (BMI) OF 36.0 TO 36.9 IN ADULT, UNSPECIFIED OBESITY TYPE (HCC): ICD-10-CM

## 2020-11-25 DIAGNOSIS — E11.42 TYPE 2 DIABETES MELLITUS WITH DIABETIC POLYNEUROPATHY, WITHOUT LONG-TERM CURRENT USE OF INSULIN (HCC): Primary | ICD-10-CM

## 2020-11-25 DIAGNOSIS — I10 ESSENTIAL HYPERTENSION: ICD-10-CM

## 2020-11-25 DIAGNOSIS — E11.29 MICROALBUMINURIA DUE TO TYPE 2 DIABETES MELLITUS (HCC): ICD-10-CM

## 2020-11-25 DIAGNOSIS — R80.9 MICROALBUMINURIA DUE TO TYPE 2 DIABETES MELLITUS (HCC): ICD-10-CM

## 2020-11-25 DIAGNOSIS — E78.2 MIXED HYPERLIPIDEMIA: ICD-10-CM

## 2020-11-25 DIAGNOSIS — D48.5 NEOPLASM OF UNCERTAIN BEHAVIOR OF SKIN: ICD-10-CM

## 2020-11-25 DIAGNOSIS — Z00.00 MEDICARE ANNUAL WELLNESS VISIT, SUBSEQUENT: ICD-10-CM

## 2020-11-25 PROBLEM — J44.9 CHRONIC OBSTRUCTIVE PULMONARY DISEASE (HCC): Status: RESOLVED | Noted: 2019-11-22 | Resolved: 2020-11-25

## 2020-11-25 LAB — SL AMB POCT HEMOGLOBIN AIC: 8.7 (ref ?–6.5)

## 2020-11-25 PROCEDURE — T1015 CLINIC SERVICE: HCPCS | Performed by: FAMILY MEDICINE

## 2020-11-25 PROCEDURE — 3725F SCREEN DEPRESSION PERFORMED: CPT | Performed by: FAMILY MEDICINE

## 2020-11-25 PROCEDURE — 3052F HG A1C>EQUAL 8.0%<EQUAL 9.0%: CPT | Performed by: FAMILY MEDICINE

## 2020-11-25 PROCEDURE — 3288F FALL RISK ASSESSMENT DOCD: CPT | Performed by: FAMILY MEDICINE

## 2020-11-25 PROCEDURE — 1160F RVW MEDS BY RX/DR IN RCRD: CPT | Performed by: FAMILY MEDICINE

## 2020-11-25 PROCEDURE — G0438 PPPS, INITIAL VISIT: HCPCS | Performed by: FAMILY MEDICINE

## 2020-11-25 PROCEDURE — 3008F BODY MASS INDEX DOCD: CPT | Performed by: FAMILY MEDICINE

## 2020-11-25 PROCEDURE — 1036F TOBACCO NON-USER: CPT | Performed by: FAMILY MEDICINE

## 2020-11-25 PROCEDURE — 99214 OFFICE O/P EST MOD 30 MIN: CPT | Performed by: FAMILY MEDICINE

## 2020-11-25 PROCEDURE — 1100F PTFALLS ASSESS-DOCD GE2>/YR: CPT | Performed by: FAMILY MEDICINE

## 2020-11-25 PROCEDURE — 1125F AMNT PAIN NOTED PAIN PRSNT: CPT | Performed by: FAMILY MEDICINE

## 2020-11-25 PROCEDURE — 1170F FXNL STATUS ASSESSED: CPT | Performed by: FAMILY MEDICINE

## 2020-11-25 PROCEDURE — 83036 HEMOGLOBIN GLYCOSYLATED A1C: CPT | Performed by: FAMILY MEDICINE

## 2020-11-25 PROCEDURE — 3078F DIAST BP <80 MM HG: CPT | Performed by: FAMILY MEDICINE

## 2020-11-25 PROCEDURE — 3066F NEPHROPATHY DOC TX: CPT | Performed by: FAMILY MEDICINE

## 2020-11-25 PROCEDURE — 3074F SYST BP LT 130 MM HG: CPT | Performed by: FAMILY MEDICINE

## 2020-12-14 ENCOUNTER — VBI (OUTPATIENT)
Dept: ADMINISTRATIVE | Facility: OTHER | Age: 75
End: 2020-12-14

## 2021-01-08 DIAGNOSIS — J44.9 CHRONIC OBSTRUCTIVE PULMONARY DISEASE, UNSPECIFIED COPD TYPE (HCC): ICD-10-CM

## 2021-02-05 ENCOUNTER — TELEPHONE (OUTPATIENT)
Dept: FAMILY MEDICINE CLINIC | Facility: CLINIC | Age: 76
End: 2021-02-05

## 2021-02-05 DIAGNOSIS — I10 ESSENTIAL HYPERTENSION: ICD-10-CM

## 2021-02-05 RX ORDER — AMLODIPINE BESYLATE 10 MG/1
10 TABLET ORAL DAILY
Qty: 90 TABLET | Refills: 3 | Status: SHIPPED | OUTPATIENT
Start: 2021-02-05 | End: 2022-02-14 | Stop reason: SDUPTHER

## 2021-02-13 DIAGNOSIS — Z23 ENCOUNTER FOR IMMUNIZATION: ICD-10-CM

## 2021-02-17 DIAGNOSIS — I10 ESSENTIAL HYPERTENSION: ICD-10-CM

## 2021-02-17 RX ORDER — VALSARTAN 320 MG/1
320 TABLET ORAL DAILY
Qty: 90 TABLET | Refills: 3 | Status: SHIPPED | OUTPATIENT
Start: 2021-02-17 | End: 2022-03-29 | Stop reason: SDUPTHER

## 2021-02-19 ENCOUNTER — IMMUNIZATIONS (OUTPATIENT)
Dept: FAMILY MEDICINE CLINIC | Facility: HOSPITAL | Age: 76
End: 2021-02-19

## 2021-02-19 DIAGNOSIS — Z23 ENCOUNTER FOR IMMUNIZATION: Primary | ICD-10-CM

## 2021-02-19 PROCEDURE — 0001A SARS-COV-2 / COVID-19 MRNA VACCINE (PFIZER-BIONTECH) 30 MCG: CPT

## 2021-02-19 PROCEDURE — 91300 SARS-COV-2 / COVID-19 MRNA VACCINE (PFIZER-BIONTECH) 30 MCG: CPT

## 2021-03-01 ENCOUNTER — LAB (OUTPATIENT)
Dept: LAB | Facility: MEDICAL CENTER | Age: 76
End: 2021-03-01
Payer: COMMERCIAL

## 2021-03-01 LAB
EST. AVERAGE GLUCOSE BLD GHB EST-MCNC: 192 MG/DL
HBA1C MFR BLD: 8.3 %

## 2021-03-01 PROCEDURE — 36415 COLL VENOUS BLD VENIPUNCTURE: CPT | Performed by: FAMILY MEDICINE

## 2021-03-01 PROCEDURE — 83036 HEMOGLOBIN GLYCOSYLATED A1C: CPT | Performed by: FAMILY MEDICINE

## 2021-03-10 DIAGNOSIS — I10 ESSENTIAL HYPERTENSION: ICD-10-CM

## 2021-03-11 ENCOUNTER — IMMUNIZATIONS (OUTPATIENT)
Dept: FAMILY MEDICINE CLINIC | Facility: HOSPITAL | Age: 76
End: 2021-03-11

## 2021-03-11 DIAGNOSIS — Z23 ENCOUNTER FOR IMMUNIZATION: Primary | ICD-10-CM

## 2021-03-11 PROCEDURE — 91300 SARS-COV-2 / COVID-19 MRNA VACCINE (PFIZER-BIONTECH) 30 MCG: CPT

## 2021-03-11 PROCEDURE — 0002A SARS-COV-2 / COVID-19 MRNA VACCINE (PFIZER-BIONTECH) 30 MCG: CPT

## 2021-03-11 RX ORDER — CARVEDILOL 25 MG/1
25 TABLET ORAL 2 TIMES DAILY
Qty: 180 TABLET | Refills: 3 | Status: SHIPPED | OUTPATIENT
Start: 2021-03-11 | End: 2022-03-21 | Stop reason: SDUPTHER

## 2021-04-05 DIAGNOSIS — I10 ESSENTIAL HYPERTENSION: ICD-10-CM

## 2021-04-05 RX ORDER — CHLORTHALIDONE 25 MG/1
37.5 TABLET ORAL DAILY
Qty: 135 TABLET | Refills: 3 | Status: SHIPPED | OUTPATIENT
Start: 2021-04-05 | End: 2022-04-25 | Stop reason: SDUPTHER

## 2021-04-06 ENCOUNTER — VBI (OUTPATIENT)
Dept: ADMINISTRATIVE | Facility: OTHER | Age: 76
End: 2021-04-06

## 2021-05-14 ENCOUNTER — TELEPHONE (OUTPATIENT)
Dept: SLEEP CENTER | Facility: CLINIC | Age: 76
End: 2021-05-14

## 2021-05-14 ENCOUNTER — TELEPHONE (OUTPATIENT)
Dept: FAMILY MEDICINE CLINIC | Facility: CLINIC | Age: 76
End: 2021-05-14

## 2021-05-14 NOTE — TELEPHONE ENCOUNTER
[facility] voice [facility] from [employer] [facility] looking for [facility] study results for patient  Per [employer], no studies found in Epic or [employer]  [address] [facility] [place] and spoke to [place]  Advised sleep study may be in the old system but I do not have access  I will have one of our staff members who has access check the system and will call her back  Perry Jeffers or Jomar Mcnair, can you check old system for sleep study?

## 2021-05-14 NOTE — TELEPHONE ENCOUNTER
Received fax from  [facility] a copy of Sleep study for patient  Reviewed records in Epic and [employer], no sleep record found  Called patient and he stated that he had sleep study done years ago in [facility]  Left message at [facility], inquiring if they have record of sleep study

## 2021-05-17 NOTE — TELEPHONE ENCOUNTER
5/17 I called [facility] and fax previous sleep study at [phone]  lc

## 2021-05-18 ENCOUNTER — APPOINTMENT (OUTPATIENT)
Dept: LAB | Facility: MEDICAL CENTER | Age: 76
End: 2021-05-18
Payer: COMMERCIAL

## 2021-05-18 ENCOUNTER — TRANSCRIBE ORDERS (OUTPATIENT)
Dept: ADMINISTRATIVE | Facility: HOSPITAL | Age: 76
End: 2021-05-18

## 2021-05-18 DIAGNOSIS — E78.2 MIXED HYPERLIPIDEMIA: ICD-10-CM

## 2021-05-18 DIAGNOSIS — E11.29 MICROALBUMINURIA DUE TO TYPE 2 DIABETES MELLITUS (HCC): ICD-10-CM

## 2021-05-18 DIAGNOSIS — E11.42 TYPE 2 DIABETES MELLITUS WITH DIABETIC POLYNEUROPATHY, WITHOUT LONG-TERM CURRENT USE OF INSULIN (HCC): ICD-10-CM

## 2021-05-18 DIAGNOSIS — R80.9 MICROALBUMINURIA DUE TO TYPE 2 DIABETES MELLITUS (HCC): ICD-10-CM

## 2021-05-18 DIAGNOSIS — I10 HYPERTENSION, ESSENTIAL: Primary | ICD-10-CM

## 2021-05-18 LAB
ALBUMIN SERPL BCP-MCNC: 3.8 G/DL (ref 3.5–5)
ALP SERPL-CCNC: 64 U/L (ref 46–116)
ALT SERPL W P-5'-P-CCNC: 46 U/L (ref 12–78)
ANION GAP SERPL CALCULATED.3IONS-SCNC: 7 MMOL/L (ref 4–13)
AST SERPL W P-5'-P-CCNC: 28 U/L (ref 5–45)
BASOPHILS # BLD AUTO: 0.08 THOUSANDS/ΜL (ref 0–0.1)
BASOPHILS NFR BLD AUTO: 1 % (ref 0–1)
BILIRUB SERPL-MCNC: 0.57 MG/DL (ref 0.2–1)
BUN SERPL-MCNC: 24 MG/DL (ref 5–25)
CALCIUM SERPL-MCNC: 10 MG/DL (ref 8.3–10.1)
CHLORIDE SERPL-SCNC: 104 MMOL/L (ref 100–108)
CHOLEST SERPL-MCNC: 108 MG/DL (ref 50–200)
CO2 SERPL-SCNC: 28 MMOL/L (ref 21–32)
CREAT SERPL-MCNC: 1.15 MG/DL (ref 0.6–1.3)
CREAT UR-MCNC: 89.2 MG/DL
EOSINOPHIL # BLD AUTO: 0.47 THOUSAND/ΜL (ref 0–0.61)
EOSINOPHIL NFR BLD AUTO: 6 % (ref 0–6)
ERYTHROCYTE [DISTWIDTH] IN BLOOD BY AUTOMATED COUNT: 14 % (ref 11.6–15.1)
EST. AVERAGE GLUCOSE BLD GHB EST-MCNC: 200 MG/DL
GFR SERPL CREATININE-BSD FRML MDRD: 62 ML/MIN/1.73SQ M
GLUCOSE P FAST SERPL-MCNC: 202 MG/DL (ref 65–99)
HBA1C MFR BLD: 8.6 %
HCT VFR BLD AUTO: 41.7 % (ref 36.5–49.3)
HDLC SERPL-MCNC: 31 MG/DL
HGB BLD-MCNC: 13.6 G/DL (ref 12–17)
IMM GRANULOCYTES # BLD AUTO: 0.05 THOUSAND/UL (ref 0–0.2)
IMM GRANULOCYTES NFR BLD AUTO: 1 % (ref 0–2)
LDLC SERPL CALC-MCNC: 21 MG/DL (ref 0–100)
LYMPHOCYTES # BLD AUTO: 1.87 THOUSANDS/ΜL (ref 0.6–4.47)
LYMPHOCYTES NFR BLD AUTO: 26 % (ref 14–44)
MCH RBC QN AUTO: 27.5 PG (ref 26.8–34.3)
MCHC RBC AUTO-ENTMCNC: 32.6 G/DL (ref 31.4–37.4)
MCV RBC AUTO: 84 FL (ref 82–98)
MICROALBUMIN UR-MCNC: 73.6 MG/L (ref 0–20)
MICROALBUMIN/CREAT 24H UR: 83 MG/G CREATININE (ref 0–30)
MONOCYTES # BLD AUTO: 0.44 THOUSAND/ΜL (ref 0.17–1.22)
MONOCYTES NFR BLD AUTO: 6 % (ref 4–12)
NEUTROPHILS # BLD AUTO: 4.4 THOUSANDS/ΜL (ref 1.85–7.62)
NEUTS SEG NFR BLD AUTO: 60 % (ref 43–75)
NONHDLC SERPL-MCNC: 77 MG/DL
NRBC BLD AUTO-RTO: 0 /100 WBCS
PLATELET # BLD AUTO: 169 THOUSANDS/UL (ref 149–390)
PMV BLD AUTO: 13 FL (ref 8.9–12.7)
POTASSIUM SERPL-SCNC: 4.9 MMOL/L (ref 3.5–5.3)
PROT SERPL-MCNC: 7.6 G/DL (ref 6.4–8.2)
RBC # BLD AUTO: 4.94 MILLION/UL (ref 3.88–5.62)
SODIUM SERPL-SCNC: 139 MMOL/L (ref 136–145)
TRIGL SERPL-MCNC: 278 MG/DL
TSH SERPL DL<=0.05 MIU/L-ACNC: 2.14 UIU/ML (ref 0.36–3.74)
WBC # BLD AUTO: 7.31 THOUSAND/UL (ref 4.31–10.16)

## 2021-05-18 PROCEDURE — 84443 ASSAY THYROID STIM HORMONE: CPT | Performed by: FAMILY MEDICINE

## 2021-05-18 PROCEDURE — 3066F NEPHROPATHY DOC TX: CPT | Performed by: FAMILY MEDICINE

## 2021-05-18 PROCEDURE — 82043 UR ALBUMIN QUANTITATIVE: CPT | Performed by: FAMILY MEDICINE

## 2021-05-18 PROCEDURE — 80061 LIPID PANEL: CPT | Performed by: FAMILY MEDICINE

## 2021-05-18 PROCEDURE — 36415 COLL VENOUS BLD VENIPUNCTURE: CPT | Performed by: FAMILY MEDICINE

## 2021-05-18 PROCEDURE — 85025 COMPLETE CBC W/AUTO DIFF WBC: CPT | Performed by: FAMILY MEDICINE

## 2021-05-18 PROCEDURE — 3060F POS MICROALBUMINURIA REV: CPT | Performed by: FAMILY MEDICINE

## 2021-05-18 PROCEDURE — 83036 HEMOGLOBIN GLYCOSYLATED A1C: CPT | Performed by: FAMILY MEDICINE

## 2021-05-18 PROCEDURE — 80053 COMPREHEN METABOLIC PANEL: CPT | Performed by: FAMILY MEDICINE

## 2021-05-18 PROCEDURE — 3052F HG A1C>EQUAL 8.0%<EQUAL 9.0%: CPT | Performed by: FAMILY MEDICINE

## 2021-05-18 PROCEDURE — 82570 ASSAY OF URINE CREATININE: CPT | Performed by: FAMILY MEDICINE

## 2021-05-20 ENCOUNTER — RA CDI HCC (OUTPATIENT)
Dept: OTHER | Facility: HOSPITAL | Age: 76
End: 2021-05-20

## 2021-05-20 NOTE — PROGRESS NOTES
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

## 2021-05-24 PROBLEM — E11.29 TYPE 2 DIABETES MELLITUS WITH OTHER DIABETIC KIDNEY COMPLICATION (HCC): Status: ACTIVE | Noted: 2021-05-24

## 2021-05-24 PROBLEM — E66.01 MORBID (SEVERE) OBESITY DUE TO EXCESS CALORIES (HCC): Status: ACTIVE | Noted: 2021-05-24

## 2021-05-24 PROBLEM — E11.65 TYPE 2 DIABETES MELLITUS WITH HYPERGLYCEMIA (HCC): Status: ACTIVE | Noted: 2021-05-24

## 2021-05-24 PROBLEM — R80.9 PROTEINURIA, UNSPECIFIED: Status: ACTIVE | Noted: 2021-05-24

## 2021-05-24 PROBLEM — E11.42 TYPE 2 DIABETES MELLITUS WITH DIABETIC POLYNEUROPATHY (HCC): Status: ACTIVE | Noted: 2021-05-24

## 2021-05-27 ENCOUNTER — OFFICE VISIT (OUTPATIENT)
Dept: FAMILY MEDICINE CLINIC | Facility: CLINIC | Age: 76
End: 2021-05-27

## 2021-05-27 VITALS
WEIGHT: 235 LBS | HEART RATE: 56 BPM | BODY MASS INDEX: 35.61 KG/M2 | HEIGHT: 68 IN | DIASTOLIC BLOOD PRESSURE: 60 MMHG | RESPIRATION RATE: 16 BRPM | SYSTOLIC BLOOD PRESSURE: 128 MMHG | TEMPERATURE: 97.8 F

## 2021-05-27 DIAGNOSIS — J44.9 CHRONIC OBSTRUCTIVE PULMONARY DISEASE, UNSPECIFIED COPD TYPE (HCC): ICD-10-CM

## 2021-05-27 DIAGNOSIS — E11.29 MICROALBUMINURIA DUE TO TYPE 2 DIABETES MELLITUS (HCC): ICD-10-CM

## 2021-05-27 DIAGNOSIS — K22.70 BARRETT'S ESOPHAGUS WITHOUT DYSPLASIA: ICD-10-CM

## 2021-05-27 DIAGNOSIS — E66.01 CLASS 2 SEVERE OBESITY WITH SERIOUS COMORBIDITY AND BODY MASS INDEX (BMI) OF 36.0 TO 36.9 IN ADULT, UNSPECIFIED OBESITY TYPE (HCC): ICD-10-CM

## 2021-05-27 DIAGNOSIS — I73.9 PERIPHERAL ARTERIAL DISEASE (HCC): ICD-10-CM

## 2021-05-27 DIAGNOSIS — R80.9 MICROALBUMINURIA DUE TO TYPE 2 DIABETES MELLITUS (HCC): ICD-10-CM

## 2021-05-27 DIAGNOSIS — I10 ESSENTIAL HYPERTENSION: ICD-10-CM

## 2021-05-27 DIAGNOSIS — K76.0 FATTY LIVER: ICD-10-CM

## 2021-05-27 DIAGNOSIS — E78.2 MIXED HYPERLIPIDEMIA: ICD-10-CM

## 2021-05-27 DIAGNOSIS — E11.42 TYPE 2 DIABETES MELLITUS WITH DIABETIC POLYNEUROPATHY, WITHOUT LONG-TERM CURRENT USE OF INSULIN (HCC): Primary | ICD-10-CM

## 2021-05-27 DIAGNOSIS — K21.9 GASTROESOPHAGEAL REFLUX DISEASE WITHOUT ESOPHAGITIS: ICD-10-CM

## 2021-05-27 PROBLEM — D3A.8 NEUROENDOCRINE TUMOR: Status: ACTIVE | Noted: 2021-05-27

## 2021-05-27 PROBLEM — K44.9 DIAPHRAGMATIC HERNIA: Status: ACTIVE | Noted: 2021-05-27

## 2021-05-27 PROBLEM — K11.6: Status: ACTIVE | Noted: 2021-05-27

## 2021-05-27 PROBLEM — E11.40 DIABETIC NEUROPATHY ASSOCIATED WITH TYPE 2 DIABETES MELLITUS (HCC): Status: ACTIVE | Noted: 2021-05-24

## 2021-05-27 PROBLEM — E11.65 TYPE 2 DIABETES MELLITUS WITH HYPERGLYCEMIA (HCC): Status: RESOLVED | Noted: 2021-05-24 | Resolved: 2021-05-27

## 2021-05-27 PROCEDURE — 3078F DIAST BP <80 MM HG: CPT | Performed by: FAMILY MEDICINE

## 2021-05-27 PROCEDURE — 3725F SCREEN DEPRESSION PERFORMED: CPT | Performed by: FAMILY MEDICINE

## 2021-05-27 PROCEDURE — 99214 OFFICE O/P EST MOD 30 MIN: CPT | Performed by: FAMILY MEDICINE

## 2021-05-27 PROCEDURE — 1036F TOBACCO NON-USER: CPT | Performed by: FAMILY MEDICINE

## 2021-05-27 PROCEDURE — 1160F RVW MEDS BY RX/DR IN RCRD: CPT | Performed by: FAMILY MEDICINE

## 2021-05-27 PROCEDURE — 3074F SYST BP LT 130 MM HG: CPT | Performed by: FAMILY MEDICINE

## 2021-05-27 PROCEDURE — 3008F BODY MASS INDEX DOCD: CPT | Performed by: FAMILY MEDICINE

## 2021-05-27 RX ORDER — ATORVASTATIN CALCIUM 20 MG/1
20 TABLET, FILM COATED ORAL DAILY
Qty: 90 TABLET | Refills: 3 | Status: SHIPPED | OUTPATIENT
Start: 2021-05-27

## 2021-07-02 ENCOUNTER — TELEPHONE (OUTPATIENT)
Dept: FAMILY MEDICINE CLINIC | Facility: CLINIC | Age: 76
End: 2021-07-02

## 2021-07-02 NOTE — TELEPHONE ENCOUNTER
Received fax from [facility] stating patient needs a f/u eval since he recevied a new CPAP for his FIOR   The f/u must be between 8/1/21 and 9

## 2021-07-19 DIAGNOSIS — L30.9 DERMATITIS: ICD-10-CM

## 2021-07-19 RX ORDER — TRIAMCINOLONE ACETONIDE 1 MG/G
CREAM TOPICAL 2 TIMES DAILY
Qty: 80 G | Refills: 3 | Status: SHIPPED | OUTPATIENT
Start: 2021-07-19

## 2021-07-26 DIAGNOSIS — E11.42 TYPE 2 DIABETES MELLITUS WITH DIABETIC POLYNEUROPATHY, WITHOUT LONG-TERM CURRENT USE OF INSULIN (HCC): ICD-10-CM

## 2021-07-26 NOTE — TELEPHONE ENCOUNTER
Patient called  He stated he is taking 3 tabs of the 50 0mg of Metformin  He is asking if 800 mg tabs twice a day can be sent in for him ( 1600 mg total a day)   Please advise

## 2021-09-01 ENCOUNTER — VBI (OUTPATIENT)
Dept: ADMINISTRATIVE | Facility: OTHER | Age: 76
End: 2021-09-01

## 2021-09-15 ENCOUNTER — RA CDI HCC (OUTPATIENT)
Dept: OTHER | Facility: HOSPITAL | Age: 76
End: 2021-09-15

## 2021-09-15 NOTE — PROGRESS NOTES
[facility]  coding opportunities       Chart reviewed, no opportunity found: CHART REVIEWED, NO OPPORTUNITY FOUND                        Patients insurance company: [employer] (Medicare Advantage and Commercial)

## 2021-09-22 ENCOUNTER — OFFICE VISIT (OUTPATIENT)
Dept: FAMILY MEDICINE CLINIC | Facility: CLINIC | Age: 76
End: 2021-09-22
Payer: COMMERCIAL

## 2021-09-22 VITALS
RESPIRATION RATE: 16 BRPM | WEIGHT: 236 LBS | SYSTOLIC BLOOD PRESSURE: 150 MMHG | DIASTOLIC BLOOD PRESSURE: 64 MMHG | HEART RATE: 62 BPM | BODY MASS INDEX: 35.77 KG/M2 | HEIGHT: 68 IN | TEMPERATURE: 97.6 F | OXYGEN SATURATION: 98 %

## 2021-09-22 DIAGNOSIS — Z23 ENCOUNTER FOR IMMUNIZATION: ICD-10-CM

## 2021-09-22 DIAGNOSIS — G47.33 OSA (OBSTRUCTIVE SLEEP APNEA): Primary | ICD-10-CM

## 2021-09-22 PROCEDURE — 3078F DIAST BP <80 MM HG: CPT | Performed by: FAMILY MEDICINE

## 2021-09-22 PROCEDURE — G0008 ADMIN INFLUENZA VIRUS VAC: HCPCS

## 2021-09-22 PROCEDURE — 3008F BODY MASS INDEX DOCD: CPT | Performed by: FAMILY MEDICINE

## 2021-09-22 PROCEDURE — 99213 OFFICE O/P EST LOW 20 MIN: CPT | Performed by: FAMILY MEDICINE

## 2021-09-22 PROCEDURE — 3077F SYST BP >= 140 MM HG: CPT | Performed by: FAMILY MEDICINE

## 2021-09-22 PROCEDURE — 1160F RVW MEDS BY RX/DR IN RCRD: CPT | Performed by: FAMILY MEDICINE

## 2021-09-22 PROCEDURE — 1036F TOBACCO NON-USER: CPT | Performed by: FAMILY MEDICINE

## 2021-09-22 PROCEDURE — 90662 IIV NO PRSV INCREASED AG IM: CPT

## 2021-09-22 NOTE — PROGRESS NOTES
Assessment/Plan:         Diagnoses and all orders for this visit:    FIOR (obstructive sleep apnea)    Encounter for immunization  -     influenza vaccine, high-dose, PF 0 7 mL (FLUZONE HIGH-DOSE)            Continue with current use of CPAP  Office visit 11/2021 with labs at that time  Flu vaccine today  Patient ID: Shelley Lopez  is a 78 y o  male  Follow up visit for update on FIOR/CPAP usage  FIOR on CPAP with auto adjustment feature  He has a new mask and new CPAP device  He is currently benefiting from his current use of CPAP with restorative sleep and no excessive daytime tiredness   Asthma stable on Advair once a day  Last labs 05/2021  Type 2 DM 05/2021 A1c 8 6 increased from 8 3  He was Metformin 500 mg BID at that time  Side effects on 1,000 mg BID  He was switched to 850 mg BID  05/2021 urine micro albumin 73 6 decreased from 172  on ARB  No hypoglycemic events  Mild neuropathy symptoms numbness- no pain  Eye exam 08/2018 no diabetic retinopathy  Hypertension blood pressures have been stable on current multi drug regimen  05/2021 creatinine 1 15  Electrolytes normal  Hgb 13 6  Andrew Watson Mixed hyperlipidemia on 2 fish oil capsules a day  05/2021  Lipid profile cholesterol 108 TGs 278 decreased from 339  HDL 31  LDL 21  LFTs normal  TSH 2 140  02/2020 CT scan abdomen and pelvis ordered by his surgeon for recurrent abdominal pain  Incidental finding significant triple-vessel coronary artery calcification  No pericardial effusion  Scattered sub 3 mm central lobular calcified and noncalcified pulmonary nodules  Atherosclerotic changes of aorta no aneurysm  Patient was started Atorvastatin 40 mg a day but developed side effects- abdominal pain-now on Atorvastatin 20 mg every other day         Recent Results (from the past 3360 hour(s))   Comprehensive metabolic panel    Collection Time: 05/18/21  8:18 AM   Result Value Ref Range    Sodium 139 136 - 145 mmol/L    Potassium 4 9 3 5 - 5 3 mmol/L Chloride 104 100 - 108 mmol/L    CO2 28 21 - 32 mmol/L    ANION GAP 7 4 - 13 mmol/L    BUN 24 5 - 25 mg/dL    Creatinine 1 15 0 60 - 1 30 mg/dL    Glucose, Fasting 202 (H) 65 - 99 mg/dL    Calcium 10 0 8 3 - 10 1 mg/dL    AST 28 5 - 45 U/L    ALT 46 12 - 78 U/L    Alkaline Phosphatase 64 46 - 116 U/L    Total Protein 7 6 6 4 - 8 2 g/dL    Albumin 3 8 3 5 - 5 0 g/dL    Total Bilirubin 0 57 0 20 - 1 00 mg/dL    eGFR 62 ml/min/1 73sq m   CBC and differential    Collection Time: 05/18/21  8:18 AM   Result Value Ref Range    WBC 7 31 4 31 - 10 16 Thousand/uL    RBC 4 94 3 88 - 5 62 Million/uL    Hemoglobin 13 6 12 0 - 17 0 g/dL    Hematocrit 41 7 36 5 - 49 3 %    MCV 84 82 - 98 fL    MCH 27 5 26 8 - 34 3 pg    MCHC 32 6 31 4 - 37 4 g/dL    RDW 14 0 11 6 - 15 1 %    MPV 13 0 (H) 8 9 - 12 7 fL    Platelets 750 547 - 889 Thousands/uL    nRBC 0 /100 WBCs    Neutrophils Relative 60 43 - 75 %    Immat GRANS % 1 0 - 2 %    Lymphocytes Relative 26 14 - 44 %    Monocytes Relative 6 4 - 12 %    Eosinophils Relative 6 0 - 6 %    Basophils Relative 1 0 - 1 %    Neutrophils Absolute 4 40 1 85 - 7 62 Thousands/µL    Immature Grans Absolute 0 05 0 00 - 0 20 Thousand/uL    Lymphocytes Absolute 1 87 0 60 - 4 47 Thousands/µL    Monocytes Absolute 0 44 0 17 - 1 22 Thousand/µL    Eosinophils Absolute 0 47 0 00 - 0 61 Thousand/µL    Basophils Absolute 0 08 0 00 - 0 10 Thousands/µL   Hemoglobin A1C    Collection Time: 05/18/21  8:18 AM   Result Value Ref Range    Hemoglobin A1C 8 6 (H) Normal 3 8-5 6%; PreDiabetic 5 7-6 4%;  Diabetic >=6 5%; Glycemic control for adults with diabetes <7 0% %     mg/dl   Lipid panel    Collection Time: 05/18/21  8:18 AM   Result Value Ref Range    Cholesterol 108 50 - 200 mg/dL    Triglycerides 278 (H) <=150 mg/dL    HDL, Direct 31 (L) >=40 mg/dL    LDL Calculated 21 0 - 100 mg/dL    Non-HDL-Chol (CHOL-HDL) 77 mg/dl   Microalbumin / creatinine urine ratio    Collection Time: 05/18/21  8:18 AM   Result Value Ref Range    Creatinine, Ur 89 2 mg/dL    Microalbum  ,U,Random 73 6 (H) 0 0 - 20 0 mg/L    Microalb Creat Ratio 83 (H) 0 - 30 mg/g creatinine   TSH, 3rd generation with Free T4 reflex    Collection Time: 05/18/21  8:18 AM   Result Value Ref Range    TSH 3RD GENERATON 2 140 0 358 - 3 740 uIU/mL         The following portions of the patient's history were reviewed and updated as appropriate: allergies, current medications, past family history, past medical history, past social history, past surgical history and problem list     Review of Systems   Constitutional: Negative for appetite change, chills, fever and unexpected weight change  HENT: Negative for congestion, ear pain, rhinorrhea, sinus pain, sore throat and trouble swallowing          + chronic nasal congestion  Eyes: Negative for visual disturbance  S/p cataract surgery OS   Respiratory: Positive for apnea  Negative for cough, shortness of breath and wheezing  See HPI    Cardiovascular: Negative for chest pain, palpitations and leg swelling  See HPI  PAD with intermittent leg claudications  no rest pain  pain left hip and calf pain after walking long distances  11/2016 arterial dopplers LEs  Diffuse heterogeneous plaque throughout both legs  Evidence of right lower extremity occlusive disease in mid SFA  aortoiliac study normal but limited views of proximal common iliac arteries due to body habitus admission 08/2015 for right-sided chest pain associated with increased shortness of breath and accelerated hypertension  He ruled out for MI  EKG no acute EKG changes  nuclear stress test normal  no perfusion abnormalities  EF 67%  He was seen by nephrology  workup for secondary causes of hypertension metanephrine normal at 47  normetanephrine elevated at 183  renin 19 27  aldosterone 2 2 renal artery dopplers 03/2015 no ISIAH  kidneys hypertrophic  2 8 cm cyst left kidney      Gastrointestinal: Negative for abdominal pain, blood in stool, constipation, diarrhea, nausea and vomiting  GERD stable on Omeprazole  No dysphagia  EGD 12/2018  HH and Kessler's   03/2020 EGD duodenal polyp  Well-differentiated neuroendocrine tumor low-grade  Status post polypectomy  02/2020 CT scan abdomen and pelvis 3 8 cm exophytic left renal intra pole cyst   Atherosclerotic changes of abdominal aorta no aneurysm  Enlarged prostate  Small fat containing umbilical hernia  Small fat containing inguinal hernias  History of fatty liver 05/2021 LFTs normal   01/2018 abdominal ultrasound normal except for mild diffuse fatty infiltration of liver colonoscopy 06/2014  1 colon polyp descending colon, diverticulosis and internal hemorrhoids  Lab Results       Component                Value               Date                       ALT                      46                  05/18/2021                 AST                      28                  05/18/2021                 ALKPHOS                  64                  05/18/2021                 BILITOT                  0 57                05/18/2021                       AST (U/L)       Date                     Value                 05/26/2020               39                    02/27/2015               53 (H)                09/23/2014               71 (H)                03/18/2014               57 (H)             ALT (U/L)       Date                     Value                 05/26/2020               72                    02/27/2015               87 (H)                09/23/2014               116 (H)               03/18/2014               93 (H)                Endocrine: Negative for polydipsia and polyuria  Genitourinary: Negative for difficulty urinating          Lab Results       Component                Value               Date                       PSA                      1 5                 09/22/2020                 PSA                      0 7                 02/27/2015                 PSA 0 7                 03/18/2014                      Musculoskeletal: Negative for arthralgias and myalgias  Skin: Negative for rash  Allergic/Immunologic: Negative for environmental allergies  Neurological: Negative for dizziness, weakness, light-headedness and headaches  Hematological: Negative for adenopathy  Does not bruise/bleed easily  Psychiatric/Behavioral: Negative for dysphoric mood and sleep disturbance  Objective:    /64   Pulse 62   Temp 97 6 °F (36 4 °C)   Resp 16   Ht 5' 7 5" (1 715 m)   Wt 107 kg (236 lb)   SpO2 98%   BMI 36 42 kg/m²       BP Readings from Last 3 Encounters:   09/22/21 150/64   05/27/21 128/60   11/25/20 118/64       Wt Readings from Last 3 Encounters:   09/22/21 107 kg (236 lb)   05/27/21 107 kg (235 lb)   11/25/20 110 kg (242 lb)        Physical Exam  Vitals and nursing note reviewed  Constitutional:       General: He is not in acute distress  Appearance: He is well-developed  HENT:      Right Ear: Tympanic membrane and ear canal normal       Left Ear: Tympanic membrane and ear canal normal       Mouth/Throat:      Comments: Mallampati class III  Eyes:      General: No scleral icterus  Conjunctiva/sclera: Conjunctivae normal    Neck:      Thyroid: No thyroid mass or thyromegaly  Vascular: No carotid bruit or JVD  Trachea: No tracheal deviation  Cardiovascular:      Rate and Rhythm: Normal rate and regular rhythm  Pulses:           Carotid pulses are 2+ on the right side and 2+ on the left side  Heart sounds: Normal heart sounds  No murmur heard  No gallop  Pulmonary:      Effort: Pulmonary effort is normal  No respiratory distress  Breath sounds: Normal breath sounds  No wheezing or rales  Musculoskeletal:      Right lower leg: No edema  Left lower leg: No edema  Lymphadenopathy:      Cervical: No cervical adenopathy  Upper Body:      Right upper body: No supraclavicular adenopathy  Left upper body: No supraclavicular adenopathy  Skin:     Nails: There is no clubbing  Neurological:      Mental Status: He is alert and oriented to person, place, and time     Psychiatric:         Mood and Affect: Mood normal          Behavior: Behavior normal

## 2021-10-13 ENCOUNTER — VBI (OUTPATIENT)
Dept: ADMINISTRATIVE | Facility: OTHER | Age: 76
End: 2021-10-13

## 2021-11-18 ENCOUNTER — APPOINTMENT (OUTPATIENT)
Dept: LAB | Facility: MEDICAL CENTER | Age: 76
End: 2021-11-18
Payer: COMMERCIAL

## 2021-11-18 ENCOUNTER — RA CDI HCC (OUTPATIENT)
Dept: OTHER | Facility: HOSPITAL | Age: 76
End: 2021-11-18

## 2021-11-18 ENCOUNTER — VBI (OUTPATIENT)
Dept: ADMINISTRATIVE | Facility: OTHER | Age: 76
End: 2021-11-18

## 2021-11-18 LAB
ALBUMIN SERPL BCP-MCNC: 3.8 G/DL (ref 3.5–5)
ALP SERPL-CCNC: 55 U/L (ref 46–116)
ALT SERPL W P-5'-P-CCNC: 41 U/L (ref 12–78)
ANION GAP SERPL CALCULATED.3IONS-SCNC: 7 MMOL/L (ref 4–13)
AST SERPL W P-5'-P-CCNC: 26 U/L (ref 5–45)
BASOPHILS # BLD AUTO: 0.07 THOUSANDS/ΜL (ref 0–0.1)
BASOPHILS NFR BLD AUTO: 1 % (ref 0–1)
BILIRUB SERPL-MCNC: 1.09 MG/DL (ref 0.2–1)
BUN SERPL-MCNC: 20 MG/DL (ref 5–25)
CALCIUM SERPL-MCNC: 9.8 MG/DL (ref 8.3–10.1)
CHLORIDE SERPL-SCNC: 104 MMOL/L (ref 100–108)
CHOLEST SERPL-MCNC: 105 MG/DL (ref 50–200)
CO2 SERPL-SCNC: 28 MMOL/L (ref 21–32)
CREAT SERPL-MCNC: 1.08 MG/DL (ref 0.6–1.3)
CREAT UR-MCNC: 128 MG/DL
EOSINOPHIL # BLD AUTO: 0.51 THOUSAND/ΜL (ref 0–0.61)
EOSINOPHIL NFR BLD AUTO: 7 % (ref 0–6)
ERYTHROCYTE [DISTWIDTH] IN BLOOD BY AUTOMATED COUNT: 14.2 % (ref 11.6–15.1)
EST. AVERAGE GLUCOSE BLD GHB EST-MCNC: 157 MG/DL
GFR SERPL CREATININE-BSD FRML MDRD: 67 ML/MIN/1.73SQ M
GLUCOSE P FAST SERPL-MCNC: 136 MG/DL (ref 65–99)
HBA1C MFR BLD: 7.1 %
HCT VFR BLD AUTO: 41.5 % (ref 36.5–49.3)
HDLC SERPL-MCNC: 31 MG/DL
HGB BLD-MCNC: 13.6 G/DL (ref 12–17)
IMM GRANULOCYTES # BLD AUTO: 0.05 THOUSAND/UL (ref 0–0.2)
IMM GRANULOCYTES NFR BLD AUTO: 1 % (ref 0–2)
LDLC SERPL CALC-MCNC: 32 MG/DL (ref 0–100)
LYMPHOCYTES # BLD AUTO: 1.88 THOUSANDS/ΜL (ref 0.6–4.47)
LYMPHOCYTES NFR BLD AUTO: 27 % (ref 14–44)
MCH RBC QN AUTO: 27.8 PG (ref 26.8–34.3)
MCHC RBC AUTO-ENTMCNC: 32.8 G/DL (ref 31.4–37.4)
MCV RBC AUTO: 85 FL (ref 82–98)
MICROALBUMIN UR-MCNC: 139 MG/L (ref 0–20)
MICROALBUMIN/CREAT 24H UR: 109 MG/G CREATININE (ref 0–30)
MONOCYTES # BLD AUTO: 0.43 THOUSAND/ΜL (ref 0.17–1.22)
MONOCYTES NFR BLD AUTO: 6 % (ref 4–12)
NEUTROPHILS # BLD AUTO: 4.04 THOUSANDS/ΜL (ref 1.85–7.62)
NEUTS SEG NFR BLD AUTO: 58 % (ref 43–75)
NONHDLC SERPL-MCNC: 74 MG/DL
NRBC BLD AUTO-RTO: 0 /100 WBCS
PLATELET # BLD AUTO: 175 THOUSANDS/UL (ref 149–390)
PMV BLD AUTO: 12.5 FL (ref 8.9–12.7)
POTASSIUM SERPL-SCNC: 4.6 MMOL/L (ref 3.5–5.3)
PROT SERPL-MCNC: 7.6 G/DL (ref 6.4–8.2)
RBC # BLD AUTO: 4.9 MILLION/UL (ref 3.88–5.62)
SODIUM SERPL-SCNC: 139 MMOL/L (ref 136–145)
TRIGL SERPL-MCNC: 209 MG/DL
WBC # BLD AUTO: 6.98 THOUSAND/UL (ref 4.31–10.16)

## 2021-11-18 PROCEDURE — 83036 HEMOGLOBIN GLYCOSYLATED A1C: CPT | Performed by: FAMILY MEDICINE

## 2021-11-18 PROCEDURE — 36415 COLL VENOUS BLD VENIPUNCTURE: CPT | Performed by: FAMILY MEDICINE

## 2021-11-18 PROCEDURE — 82043 UR ALBUMIN QUANTITATIVE: CPT | Performed by: FAMILY MEDICINE

## 2021-11-18 PROCEDURE — 82570 ASSAY OF URINE CREATININE: CPT | Performed by: FAMILY MEDICINE

## 2021-11-18 PROCEDURE — 80061 LIPID PANEL: CPT | Performed by: FAMILY MEDICINE

## 2021-11-18 PROCEDURE — 80053 COMPREHEN METABOLIC PANEL: CPT | Performed by: FAMILY MEDICINE

## 2021-11-18 PROCEDURE — 85025 COMPLETE CBC W/AUTO DIFF WBC: CPT | Performed by: FAMILY MEDICINE

## 2021-12-09 PROCEDURE — 4010F ACE/ARB THERAPY RXD/TAKEN: CPT | Performed by: FAMILY MEDICINE

## 2021-12-14 ENCOUNTER — OFFICE VISIT (OUTPATIENT)
Dept: FAMILY MEDICINE CLINIC | Facility: CLINIC | Age: 76
End: 2021-12-14
Payer: COMMERCIAL

## 2021-12-14 VITALS
WEIGHT: 233 LBS | TEMPERATURE: 95.7 F | HEART RATE: 56 BPM | RESPIRATION RATE: 16 BRPM | BODY MASS INDEX: 34.51 KG/M2 | SYSTOLIC BLOOD PRESSURE: 138 MMHG | HEIGHT: 69 IN | DIASTOLIC BLOOD PRESSURE: 60 MMHG

## 2021-12-14 DIAGNOSIS — K22.70 BARRETT'S ESOPHAGUS WITHOUT DYSPLASIA: ICD-10-CM

## 2021-12-14 DIAGNOSIS — I10 PRIMARY HYPERTENSION: ICD-10-CM

## 2021-12-14 DIAGNOSIS — G47.33 OSA (OBSTRUCTIVE SLEEP APNEA): ICD-10-CM

## 2021-12-14 DIAGNOSIS — E11.42 DIABETIC POLYNEUROPATHY ASSOCIATED WITH TYPE 2 DIABETES MELLITUS (HCC): ICD-10-CM

## 2021-12-14 DIAGNOSIS — E78.2 MIXED HYPERLIPIDEMIA: ICD-10-CM

## 2021-12-14 DIAGNOSIS — Z13.89 ENCOUNTER FOR SCREENING FOR OTHER DISORDER: ICD-10-CM

## 2021-12-14 DIAGNOSIS — K21.9 GASTROESOPHAGEAL REFLUX DISEASE WITHOUT ESOPHAGITIS: ICD-10-CM

## 2021-12-14 DIAGNOSIS — K76.0 FATTY LIVER: ICD-10-CM

## 2021-12-14 DIAGNOSIS — Z00.00 MEDICARE ANNUAL WELLNESS VISIT, SUBSEQUENT: ICD-10-CM

## 2021-12-14 DIAGNOSIS — I73.9 PAD (PERIPHERAL ARTERY DISEASE) (HCC): ICD-10-CM

## 2021-12-14 DIAGNOSIS — E11.29 TYPE 2 DIABETES MELLITUS WITH MICROALBUMINURIA, WITHOUT LONG-TERM CURRENT USE OF INSULIN (HCC): Primary | ICD-10-CM

## 2021-12-14 DIAGNOSIS — R80.9 TYPE 2 DIABETES MELLITUS WITH MICROALBUMINURIA, WITHOUT LONG-TERM CURRENT USE OF INSULIN (HCC): Primary | ICD-10-CM

## 2021-12-14 DIAGNOSIS — J44.9 CHRONIC OBSTRUCTIVE PULMONARY DISEASE, UNSPECIFIED COPD TYPE (HCC): ICD-10-CM

## 2021-12-14 PROBLEM — E66.01 CLASS 2 SEVERE OBESITY WITH SERIOUS COMORBIDITY AND BODY MASS INDEX (BMI) OF 36.0 TO 36.9 IN ADULT (HCC): Status: RESOLVED | Noted: 2019-11-22 | Resolved: 2021-12-14

## 2021-12-14 PROBLEM — E66.812 CLASS 2 SEVERE OBESITY WITH SERIOUS COMORBIDITY AND BODY MASS INDEX (BMI) OF 36.0 TO 36.9 IN ADULT (HCC): Status: RESOLVED | Noted: 2019-11-22 | Resolved: 2021-12-14

## 2021-12-14 PROBLEM — E66.01 MORBID (SEVERE) OBESITY DUE TO EXCESS CALORIES (HCC): Status: RESOLVED | Noted: 2021-05-24 | Resolved: 2021-12-14

## 2021-12-14 PROCEDURE — G0439 PPPS, SUBSEQ VISIT: HCPCS | Performed by: FAMILY MEDICINE

## 2021-12-14 PROCEDURE — 3075F SYST BP GE 130 - 139MM HG: CPT | Performed by: FAMILY MEDICINE

## 2021-12-14 PROCEDURE — 1125F AMNT PAIN NOTED PAIN PRSNT: CPT | Performed by: FAMILY MEDICINE

## 2021-12-14 PROCEDURE — 1101F PT FALLS ASSESS-DOCD LE1/YR: CPT | Performed by: FAMILY MEDICINE

## 2021-12-14 PROCEDURE — 1170F FXNL STATUS ASSESSED: CPT | Performed by: FAMILY MEDICINE

## 2021-12-14 PROCEDURE — 3725F SCREEN DEPRESSION PERFORMED: CPT | Performed by: FAMILY MEDICINE

## 2021-12-14 PROCEDURE — 1036F TOBACCO NON-USER: CPT | Performed by: FAMILY MEDICINE

## 2021-12-14 PROCEDURE — 99214 OFFICE O/P EST MOD 30 MIN: CPT | Performed by: FAMILY MEDICINE

## 2021-12-14 PROCEDURE — 1160F RVW MEDS BY RX/DR IN RCRD: CPT | Performed by: FAMILY MEDICINE

## 2021-12-14 PROCEDURE — 3008F BODY MASS INDEX DOCD: CPT | Performed by: FAMILY MEDICINE

## 2021-12-14 PROCEDURE — 3066F NEPHROPATHY DOC TX: CPT | Performed by: FAMILY MEDICINE

## 2021-12-14 PROCEDURE — G0444 DEPRESSION SCREEN ANNUAL: HCPCS | Performed by: FAMILY MEDICINE

## 2021-12-14 PROCEDURE — 3288F FALL RISK ASSESSMENT DOCD: CPT | Performed by: FAMILY MEDICINE

## 2021-12-14 PROCEDURE — 3078F DIAST BP <80 MM HG: CPT | Performed by: FAMILY MEDICINE

## 2021-12-20 ENCOUNTER — VBI (OUTPATIENT)
Dept: ADMINISTRATIVE | Facility: OTHER | Age: 76
End: 2021-12-20

## 2021-12-28 ENCOUNTER — APPOINTMENT (OUTPATIENT)
Dept: LAB | Facility: MEDICAL CENTER | Age: 76
End: 2021-12-28
Payer: COMMERCIAL

## 2021-12-28 DIAGNOSIS — N40.0 BENIGN PROSTATIC HYPERPLASIA, UNSPECIFIED WHETHER LOWER URINARY TRACT SYMPTOMS PRESENT: ICD-10-CM

## 2021-12-28 LAB
BACTERIA UR QL AUTO: NORMAL /HPF
BILIRUB UR QL STRIP: NEGATIVE
CLARITY UR: CLEAR
COLOR UR: YELLOW
CREAT UR-MCNC: 127 MG/DL
GLUCOSE UR STRIP-MCNC: NEGATIVE MG/DL
HGB UR QL STRIP.AUTO: NEGATIVE
HYALINE CASTS #/AREA URNS LPF: NORMAL /LPF
KETONES UR STRIP-MCNC: NEGATIVE MG/DL
LEUKOCYTE ESTERASE UR QL STRIP: NEGATIVE
MICROALBUMIN UR-MCNC: 61.9 MG/L (ref 0–20)
MICROALBUMIN/CREAT 24H UR: 49 MG/G CREATININE (ref 0–30)
NITRITE UR QL STRIP: NEGATIVE
NON-SQ EPI CELLS URNS QL MICRO: NORMAL /HPF
PH UR STRIP.AUTO: 6 [PH]
PROT UR STRIP-MCNC: ABNORMAL MG/DL
PSA SERPL-MCNC: 1.9 NG/ML (ref 0–4)
RBC #/AREA URNS AUTO: NORMAL /HPF
SP GR UR STRIP.AUTO: 1.02 (ref 1–1.03)
UROBILINOGEN UR QL STRIP.AUTO: 0.2 E.U./DL
WBC #/AREA URNS AUTO: NORMAL /HPF

## 2021-12-28 PROCEDURE — 3060F POS MICROALBUMINURIA REV: CPT | Performed by: FAMILY MEDICINE

## 2021-12-28 PROCEDURE — 84153 ASSAY OF PSA TOTAL: CPT

## 2021-12-28 PROCEDURE — 82570 ASSAY OF URINE CREATININE: CPT | Performed by: FAMILY MEDICINE

## 2021-12-28 PROCEDURE — 81001 URINALYSIS AUTO W/SCOPE: CPT

## 2021-12-28 PROCEDURE — 82043 UR ALBUMIN QUANTITATIVE: CPT | Performed by: FAMILY MEDICINE

## 2022-01-04 ENCOUNTER — VBI (OUTPATIENT)
Dept: ADMINISTRATIVE | Facility: OTHER | Age: 77
End: 2022-01-04

## 2022-01-26 DIAGNOSIS — J44.9 CHRONIC OBSTRUCTIVE PULMONARY DISEASE, UNSPECIFIED COPD TYPE (HCC): ICD-10-CM

## 2022-02-14 DIAGNOSIS — I10 ESSENTIAL HYPERTENSION: ICD-10-CM

## 2022-02-14 RX ORDER — AMLODIPINE BESYLATE 10 MG/1
10 TABLET ORAL DAILY
Qty: 90 TABLET | Refills: 3 | Status: SHIPPED | OUTPATIENT
Start: 2022-02-14

## 2022-03-21 DIAGNOSIS — I10 ESSENTIAL HYPERTENSION: ICD-10-CM

## 2022-03-21 RX ORDER — CARVEDILOL 25 MG/1
25 TABLET ORAL 2 TIMES DAILY
Qty: 180 TABLET | Refills: 3 | Status: SHIPPED | OUTPATIENT
Start: 2022-03-21

## 2022-03-29 DIAGNOSIS — I10 ESSENTIAL HYPERTENSION: ICD-10-CM

## 2022-03-29 RX ORDER — VALSARTAN 320 MG/1
320 TABLET ORAL DAILY
Qty: 90 TABLET | Refills: 3 | Status: SHIPPED | OUTPATIENT
Start: 2022-03-29

## 2022-04-25 DIAGNOSIS — I10 ESSENTIAL HYPERTENSION: ICD-10-CM

## 2022-04-25 RX ORDER — CHLORTHALIDONE 25 MG/1
37.5 TABLET ORAL DAILY
Qty: 135 TABLET | Refills: 3 | Status: SHIPPED | OUTPATIENT
Start: 2022-04-25

## 2022-04-29 ENCOUNTER — VBI (OUTPATIENT)
Dept: ADMINISTRATIVE | Facility: OTHER | Age: 77
End: 2022-04-29

## 2022-06-14 ENCOUNTER — APPOINTMENT (OUTPATIENT)
Dept: LAB | Facility: MEDICAL CENTER | Age: 77
End: 2022-06-14
Payer: COMMERCIAL

## 2022-06-14 DIAGNOSIS — E78.2 MIXED HYPERLIPIDEMIA: ICD-10-CM

## 2022-06-14 DIAGNOSIS — R80.9 TYPE 2 DIABETES MELLITUS WITH MICROALBUMINURIA, WITHOUT LONG-TERM CURRENT USE OF INSULIN (HCC): ICD-10-CM

## 2022-06-14 DIAGNOSIS — E11.29 TYPE 2 DIABETES MELLITUS WITH MICROALBUMINURIA, WITHOUT LONG-TERM CURRENT USE OF INSULIN (HCC): ICD-10-CM

## 2022-06-14 DIAGNOSIS — I10 PRIMARY HYPERTENSION: ICD-10-CM

## 2022-06-14 LAB
ALBUMIN SERPL BCP-MCNC: 3.6 G/DL (ref 3.5–5)
ALP SERPL-CCNC: 62 U/L (ref 46–116)
ALT SERPL W P-5'-P-CCNC: 37 U/L (ref 12–78)
ANION GAP SERPL CALCULATED.3IONS-SCNC: 2 MMOL/L (ref 4–13)
AST SERPL W P-5'-P-CCNC: 23 U/L (ref 5–45)
BASOPHILS # BLD AUTO: 0.09 THOUSANDS/ΜL (ref 0–0.1)
BASOPHILS NFR BLD AUTO: 1 % (ref 0–1)
BILIRUB SERPL-MCNC: 0.42 MG/DL (ref 0.2–1)
BUN SERPL-MCNC: 22 MG/DL (ref 5–25)
CALCIUM SERPL-MCNC: 9.8 MG/DL (ref 8.3–10.1)
CHLORIDE SERPL-SCNC: 106 MMOL/L (ref 100–108)
CHOLEST SERPL-MCNC: 114 MG/DL
CO2 SERPL-SCNC: 30 MMOL/L (ref 21–32)
CREAT SERPL-MCNC: 1.14 MG/DL (ref 0.6–1.3)
EOSINOPHIL # BLD AUTO: 0.65 THOUSAND/ΜL (ref 0–0.61)
EOSINOPHIL NFR BLD AUTO: 9 % (ref 0–6)
ERYTHROCYTE [DISTWIDTH] IN BLOOD BY AUTOMATED COUNT: 14.3 % (ref 11.6–15.1)
EST. AVERAGE GLUCOSE BLD GHB EST-MCNC: 154 MG/DL
GFR SERPL CREATININE-BSD FRML MDRD: 62 ML/MIN/1.73SQ M
GLUCOSE P FAST SERPL-MCNC: 154 MG/DL (ref 65–99)
HBA1C MFR BLD: 7 %
HCT VFR BLD AUTO: 41.1 % (ref 36.5–49.3)
HDLC SERPL-MCNC: 31 MG/DL
HGB BLD-MCNC: 13 G/DL (ref 12–17)
IMM GRANULOCYTES # BLD AUTO: 0.05 THOUSAND/UL (ref 0–0.2)
IMM GRANULOCYTES NFR BLD AUTO: 1 % (ref 0–2)
LDLC SERPL CALC-MCNC: 46 MG/DL (ref 0–100)
LYMPHOCYTES # BLD AUTO: 1.89 THOUSANDS/ΜL (ref 0.6–4.47)
LYMPHOCYTES NFR BLD AUTO: 25 % (ref 14–44)
MCH RBC QN AUTO: 26.5 PG (ref 26.8–34.3)
MCHC RBC AUTO-ENTMCNC: 31.6 G/DL (ref 31.4–37.4)
MCV RBC AUTO: 84 FL (ref 82–98)
MONOCYTES # BLD AUTO: 0.45 THOUSAND/ΜL (ref 0.17–1.22)
MONOCYTES NFR BLD AUTO: 6 % (ref 4–12)
NEUTROPHILS # BLD AUTO: 4.47 THOUSANDS/ΜL (ref 1.85–7.62)
NEUTS SEG NFR BLD AUTO: 58 % (ref 43–75)
NONHDLC SERPL-MCNC: 83 MG/DL
NRBC BLD AUTO-RTO: 0 /100 WBCS
PLATELET # BLD AUTO: 176 THOUSANDS/UL (ref 149–390)
PMV BLD AUTO: 12.5 FL (ref 8.9–12.7)
POTASSIUM SERPL-SCNC: 4.3 MMOL/L (ref 3.5–5.3)
PROT SERPL-MCNC: 7.5 G/DL (ref 6.4–8.2)
RBC # BLD AUTO: 4.9 MILLION/UL (ref 3.88–5.62)
SODIUM SERPL-SCNC: 138 MMOL/L (ref 136–145)
TRIGL SERPL-MCNC: 185 MG/DL
TSH SERPL DL<=0.05 MIU/L-ACNC: 1.98 UIU/ML (ref 0.45–4.5)
WBC # BLD AUTO: 7.6 THOUSAND/UL (ref 4.31–10.16)

## 2022-06-14 PROCEDURE — 80061 LIPID PANEL: CPT | Performed by: FAMILY MEDICINE

## 2022-06-14 PROCEDURE — 80053 COMPREHEN METABOLIC PANEL: CPT | Performed by: FAMILY MEDICINE

## 2022-06-14 PROCEDURE — 3060F POS MICROALBUMINURIA REV: CPT | Performed by: FAMILY MEDICINE

## 2022-06-14 PROCEDURE — 85025 COMPLETE CBC W/AUTO DIFF WBC: CPT | Performed by: FAMILY MEDICINE

## 2022-06-14 PROCEDURE — 36415 COLL VENOUS BLD VENIPUNCTURE: CPT | Performed by: FAMILY MEDICINE

## 2022-06-14 PROCEDURE — 3066F NEPHROPATHY DOC TX: CPT | Performed by: FAMILY MEDICINE

## 2022-06-14 PROCEDURE — 83036 HEMOGLOBIN GLYCOSYLATED A1C: CPT | Performed by: FAMILY MEDICINE

## 2022-06-14 PROCEDURE — 3051F HG A1C>EQUAL 7.0%<8.0%: CPT | Performed by: FAMILY MEDICINE

## 2022-06-14 PROCEDURE — 84443 ASSAY THYROID STIM HORMONE: CPT | Performed by: FAMILY MEDICINE

## 2022-06-16 ENCOUNTER — RA CDI HCC (OUTPATIENT)
Dept: OTHER | Facility: HOSPITAL | Age: 77
End: 2022-06-16

## 2022-06-16 NOTE — PROGRESS NOTES
[facility]  coding opportunities          Chart Reviewed number of suggestions sent to Provider: 1     Patients Insurance     Medicare Insurance: [employer] [facility] Advantage        [id number]

## 2022-06-21 ENCOUNTER — VBI (OUTPATIENT)
Dept: ADMINISTRATIVE | Facility: OTHER | Age: 77
End: 2022-06-21

## 2022-06-23 ENCOUNTER — OFFICE VISIT (OUTPATIENT)
Dept: FAMILY MEDICINE CLINIC | Facility: CLINIC | Age: 77
End: 2022-06-23
Payer: COMMERCIAL

## 2022-06-23 VITALS
HEART RATE: 58 BPM | TEMPERATURE: 97.3 F | BODY MASS INDEX: 35.1 KG/M2 | HEIGHT: 69 IN | SYSTOLIC BLOOD PRESSURE: 118 MMHG | DIASTOLIC BLOOD PRESSURE: 72 MMHG | RESPIRATION RATE: 16 BRPM | WEIGHT: 237 LBS

## 2022-06-23 DIAGNOSIS — D3A.8 NEUROENDOCRINE TUMOR: ICD-10-CM

## 2022-06-23 DIAGNOSIS — I10 PRIMARY HYPERTENSION: ICD-10-CM

## 2022-06-23 DIAGNOSIS — G47.33 OSA (OBSTRUCTIVE SLEEP APNEA): ICD-10-CM

## 2022-06-23 DIAGNOSIS — E11.42 DIABETIC POLYNEUROPATHY ASSOCIATED WITH TYPE 2 DIABETES MELLITUS (HCC): ICD-10-CM

## 2022-06-23 DIAGNOSIS — K22.70 BARRETT'S ESOPHAGUS WITHOUT DYSPLASIA: ICD-10-CM

## 2022-06-23 DIAGNOSIS — R80.9 TYPE 2 DIABETES MELLITUS WITH MICROALBUMINURIA, WITHOUT LONG-TERM CURRENT USE OF INSULIN (HCC): Primary | ICD-10-CM

## 2022-06-23 DIAGNOSIS — K76.0 FATTY LIVER: ICD-10-CM

## 2022-06-23 DIAGNOSIS — E78.2 MIXED HYPERLIPIDEMIA: ICD-10-CM

## 2022-06-23 DIAGNOSIS — J44.9 CHRONIC OBSTRUCTIVE PULMONARY DISEASE, UNSPECIFIED COPD TYPE (HCC): ICD-10-CM

## 2022-06-23 DIAGNOSIS — E11.29 TYPE 2 DIABETES MELLITUS WITH MICROALBUMINURIA, WITHOUT LONG-TERM CURRENT USE OF INSULIN (HCC): Primary | ICD-10-CM

## 2022-06-23 DIAGNOSIS — I73.9 PAD (PERIPHERAL ARTERY DISEASE) (HCC): ICD-10-CM

## 2022-06-23 PROCEDURE — 99214 OFFICE O/P EST MOD 30 MIN: CPT | Performed by: FAMILY MEDICINE

## 2022-06-23 PROCEDURE — 1036F TOBACCO NON-USER: CPT | Performed by: FAMILY MEDICINE

## 2022-06-23 PROCEDURE — 3078F DIAST BP <80 MM HG: CPT | Performed by: FAMILY MEDICINE

## 2022-06-23 PROCEDURE — 1160F RVW MEDS BY RX/DR IN RCRD: CPT | Performed by: FAMILY MEDICINE

## 2022-06-23 PROCEDURE — 3074F SYST BP LT 130 MM HG: CPT | Performed by: FAMILY MEDICINE

## 2022-06-23 PROCEDURE — 3008F BODY MASS INDEX DOCD: CPT | Performed by: FAMILY MEDICINE

## 2022-06-23 NOTE — PROGRESS NOTES
Assessment/Plan:         Diagnoses and all orders for this visit:    Type 2 diabetes mellitus with microalbuminuria, without long-term current use of insulin (HCC)  -     Hemoglobin A1C  -     Microalbumin / creatinine urine ratio; Future    Diabetic polyneuropathy associated with type 2 diabetes mellitus (HCC)    Primary hypertension  -     CBC and differential  -     Comprehensive metabolic panel    Mixed hyperlipidemia  -     Lipid panel    PAD (peripheral artery disease) (HCC)    Chronic obstructive pulmonary disease, unspecified COPD type (HCC)    FIOR (obstructive sleep apnea)    Fatty liver    Kessler's esophagus without dysplasia    Neuroendocrine tumor          Continue with current medications  OV 6 months with repeat labs  He is due for an eye exam  He completed 4th COV 19 vaccine  BMI Counseling: Body mass index is 35 kg/m²  The BMI is above normal  Nutrition recommendations include reducing portion sizes, decreasing overall calorie intake, consuming healthier snacks, moderation in carbohydrate intake, reducing intake of saturated fat and trans fat and reducing intake of cholesterol  Exercise recommendations include exercising 3-5 times per week  Patient ID: Aurea Mendoza  is a 78 y o  male  Follow up visit  Medications reviewed  Labs 06/2022 see note  Type 2 DM   on Metformin 850 mg BID  Side effects on 1,000 mg BID    A1c 7 1  Urine micro albumin 96 1 decreased from  139  on ARB  No hypoglycemic events  Mild neuropathy symptoms numbness- no pain  Lat eye exam 08/2018 no diabetic retinopathy  Hypertension blood pressures have been stable on current multi drug regimen creatinine 1 14 GFR 62  Electrolytes normal  Hgb 13 0  Prior Nephrology evaluation and workup for secondary causes of Hypertension- metanephrine normal at 47  normetanephrine elevated at 183  renin 19 27  aldosterone 2 2 renal artery dopplers 03/2015 no ISIAH  kidneys hypertrophic  2 8 cm cyst left kidney    Mixed hyperlipidemia and PAD on Atorvastatin 20 mg/day-side effects from 40 mg dose- and 2 fish oil capsules a day  Lipid profile cholesterol 114  TGs 185 decreased from 209  HDL 31  LDL 46  LFTs normal  TSH 1 980  02/2020 CT scan abdomen and pelvis ordered by his surgeon for recurrent abdominal pain  Incidental finding significant triple-vessel coronary artery calcification  No pericardial effusion  Scattered sub 3 mm central lobular calcified and noncalcified pulmonary nodules  Atherosclerotic changes of aorta no aneurysm        Recent Results (from the past 672 hour(s))   Comprehensive metabolic panel    Collection Time: 06/14/22  8:44 AM   Result Value Ref Range    Sodium 138 136 - 145 mmol/L    Potassium 4 3 3 5 - 5 3 mmol/L    Chloride 106 100 - 108 mmol/L    CO2 30 21 - 32 mmol/L    ANION GAP 2 (L) 4 - 13 mmol/L    BUN 22 5 - 25 mg/dL    Creatinine 1 14 0 60 - 1 30 mg/dL    Glucose, Fasting 154 (H) 65 - 99 mg/dL    Calcium 9 8 8 3 - 10 1 mg/dL    AST 23 5 - 45 U/L    ALT 37 12 - 78 U/L    Alkaline Phosphatase 62 46 - 116 U/L    Total Protein 7 5 6 4 - 8 2 g/dL    Albumin 3 6 3 5 - 5 0 g/dL    Total Bilirubin 0 42 0 20 - 1 00 mg/dL    eGFR 62 ml/min/1 73sq m   CBC and differential    Collection Time: 06/14/22  8:44 AM   Result Value Ref Range    WBC 7 60 4 31 - 10 16 Thousand/uL    RBC 4 90 3 88 - 5 62 Million/uL    Hemoglobin 13 0 12 0 - 17 0 g/dL    Hematocrit 41 1 36 5 - 49 3 %    MCV 84 82 - 98 fL    MCH 26 5 (L) 26 8 - 34 3 pg    MCHC 31 6 31 4 - 37 4 g/dL    RDW 14 3 11 6 - 15 1 %    MPV 12 5 8 9 - 12 7 fL    Platelets 648 647 - 622 Thousands/uL    nRBC 0 /100 WBCs    Neutrophils Relative 58 43 - 75 %    Immat GRANS % 1 0 - 2 %    Lymphocytes Relative 25 14 - 44 %    Monocytes Relative 6 4 - 12 %    Eosinophils Relative 9 (H) 0 - 6 %    Basophils Relative 1 0 - 1 %    Neutrophils Absolute 4 47 1 85 - 7 62 Thousands/µL    Immature Grans Absolute 0 05 0 00 - 0 20 Thousand/uL    Lymphocytes Absolute 1 89 0 60 - 4 47 Thousands/µL    Monocytes Absolute 0 45 0 17 - 1 22 Thousand/µL    Eosinophils Absolute 0 65 (H) 0 00 - 0 61 Thousand/µL    Basophils Absolute 0 09 0 00 - 0 10 Thousands/µL   Lipid panel    Collection Time: 06/14/22  8:44 AM   Result Value Ref Range    Cholesterol 114 See Comment mg/dL    Triglycerides 185 (H) See Comment mg/dL    HDL, Direct 31 (L) >=40 mg/dL    LDL Calculated 46 0 - 100 mg/dL    Non-HDL-Chol (CHOL-HDL) 83 mg/dl   Hemoglobin A1C    Collection Time: 06/14/22  8:44 AM   Result Value Ref Range    Hemoglobin A1C 7 0 (H) Normal 3 8-5 6%; PreDiabetic 5 7-6 4%; Diabetic >=6 5%; Glycemic control for adults with diabetes <7 0% %     mg/dl   TSH, 3rd generation with Free T4 reflex    Collection Time: 06/14/22  8:44 AM   Result Value Ref Range    TSH 3RD GENERATON 1 980 0 450 - 4 500 uIU/mL   Microalbumin / creatinine urine ratio    Collection Time: 06/14/22  8:44 AM   Result Value Ref Range    Creatinine, Ur 83 3 mg/dL    Microalbum  ,U,Random 96 1 (H) 0 0 - 20 0 mg/L    Microalb Creat Ratio 115 (H) 0 - 30 mg/g creatinine             The following portions of the patient's history were reviewed and updated as appropriate: allergies, current medications, past family history, past medical history, past social history, past surgical history and problem list     Review of Systems   Constitutional: Negative for appetite change, chills, fever and unexpected weight change  HENT: Negative for congestion, ear pain, rhinorrhea, sinus pain, sore throat and trouble swallowing          + chronic nasal congestion  Eyes: Negative for visual disturbance  S/p cataract surgery OS   Respiratory: Positive for apnea  Negative for cough, shortness of breath and wheezing  FIOR/CPAP usage  FIOR on CPAP with auto adjustment feature    He is currently benefiting from his current use of CPAP with restorative sleep and no excessive daytime tiredness   COPD stable on Advair once a day   Cardiovascular: Negative for chest pain, palpitations and leg swelling  See HPI  PAD with intermittent leg claudications  no rest pain  pain left hip and calf pain after walking long distances  11/2016 arterial dopplers LEs  Diffuse heterogeneous plaque throughout both legs  Evidence of right lower extremity occlusive disease in mid SFA  aortoiliac study normal but limited views of proximal common iliac arteries due to body habitus admission 08/2015 for right-sided chest pain associated with increased shortness of breath and accelerated hypertension  He ruled out for MI  EKG no acute EKG changes  nuclear stress test normal  no perfusion abnormalities  EF 67%  Gastrointestinal: Negative for abdominal pain, blood in stool, constipation, diarrhea, nausea and vomiting  GERD/Kessler's  stable on Omeprazole  No dysphagia  Repeat EGD 12/2021 02/2020 CT scan abdomen and pelvis 3 8 cm exophytic left renal intra pole cyst   Atherosclerotic changes of abdominal aorta no aneurysm  Enlarged prostate  Small fat containing umbilical hernia  Small fat containing inguinal hernias  Well-differentiated neuroendocrine tumor low-grade  Fatty liver 06/2022 LFTs normal  Albumin 3 6  Platelets 227,747  57/2797 abdominal ultrasound normal except for mild diffuse fatty infiltration of liver  Colonoscopy 06/2014  1 colon polyp descending colon, diverticulosis and internal hemorrhoids  Lab Results       Component                Value               Date                       ALT                      37                  06/14/2022                 AST                      23                  06/14/2022                 ALKPHOS                  62                  06/14/2022                 BILITOT                  0 42                06/14/2022                                                Endocrine: Negative for polydipsia and polyuria  Genitourinary: Negative for difficulty urinating          Lab Results       Component Value               Date                       PSA                      1 5                 09/22/2020                 PSA                      0 7                 02/27/2015                 PSA                      0 7                 03/18/2014                      Musculoskeletal: Negative for arthralgias and myalgias  Skin: Negative for rash  Allergic/Immunologic: Negative for environmental allergies  Neurological: Negative for dizziness, weakness, light-headedness and headaches  Hematological: Negative for adenopathy  Does not bruise/bleed easily  Psychiatric/Behavioral: Negative for dysphoric mood and sleep disturbance  Objective:    /72   Pulse 58   Temp (!) 97 3 °F (36 3 °C)   Resp 16   Ht 5' 9" (1 753 m)   Wt 108 kg (237 lb)   BMI 35 00 kg/m²     BP Readings from Last 3 Encounters:   06/23/22 118/72   12/14/21 138/60   09/22/21 150/64     Wt Readings from Last 3 Encounters:   06/23/22 108 kg (237 lb)   12/14/21 106 kg (233 lb)   09/22/21 107 kg (236 lb)          Physical Exam  Vitals and nursing note reviewed  Constitutional:       General: He is not in acute distress  Appearance: He is well-developed  HENT:      Right Ear: Tympanic membrane and ear canal normal       Left Ear: Tympanic membrane and ear canal normal    Eyes:      General: No scleral icterus  Extraocular Movements: Extraocular movements intact  Conjunctiva/sclera: Conjunctivae normal       Pupils: Pupils are equal, round, and reactive to light  Neck:      Thyroid: No thyroid mass or thyromegaly  Vascular: No carotid bruit or JVD  Trachea: No tracheal deviation  Cardiovascular:      Rate and Rhythm: Normal rate and regular rhythm  Pulses:           Carotid pulses are 2+ on the right side and 2+ on the left side  Heart sounds: Normal heart sounds  No murmur heard  No gallop  Pulmonary:      Effort: Pulmonary effort is normal  No respiratory distress        Breath sounds: Normal breath sounds  No wheezing or rales  Chest:   Breasts:      Right: No supraclavicular adenopathy  Left: No supraclavicular adenopathy  Abdominal:      General: Bowel sounds are normal  There is no distension or abdominal bruit  Palpations: Abdomen is soft  There is no hepatomegaly, splenomegaly or mass  Tenderness: There is no abdominal tenderness  There is no guarding or rebound  Musculoskeletal:      Right lower leg: No edema  Left lower leg: No edema  Lymphadenopathy:      Cervical: No cervical adenopathy  Upper Body:      Right upper body: No supraclavicular adenopathy  Left upper body: No supraclavicular adenopathy  Skin:     Findings: No rash  Nails: There is no clubbing  Neurological:      General: No focal deficit present  Mental Status: He is alert and oriented to person, place, and time     Psychiatric:         Mood and Affect: Mood normal

## 2022-06-23 NOTE — PATIENT INSTRUCTIONS
10% - bad control"> 10% - bad control,Hemoglobin A1c (HbA1c) greater than 10% indicating poor diabetic control,Haemoglobin A1c greater than 10% indicating poor diabetic control">   Diabetes Mellitus Type 2 in Adults, Ambulatory Care   GENERAL INFORMATION:   Diabetes mellitus type 2  is a disease that affects how your body uses glucose (sugar)  Insulin helps move sugar out of the blood so it can be used for energy  Normally, when the blood sugar level increases, the pancreas makes more insulin  Type 2 diabetes develops because either the body cannot make enough insulin, or it cannot use the insulin correctly  After many years, your pancreas may stop making insulin  Common symptoms include the following:   · More hunger or thirst than usual     · Frequent urination     · Weight loss without trying     · Blurred vision  Seek immediate care for the following symptoms:   · Severe abdominal pain, or pain that spreads to your back  You may also be vomiting  · Trouble staying awake or focusing    · Shaking or sweating    · Blurred or double vision    · Breath has a fruity, sweet smell    · Breathing is deep and labored, or rapid and shallow    · Heartbeat is fast and weak  Treatment for diabetes mellitus type 2  includes keeping your blood sugar at a normal level  You must eat the right foods, and exercise regularly  You may also need medicine if you cannot control your blood sugar level with nutrition and exercise  Manage diabetes mellitus type 2:   · Check your blood sugar level  You will be taught how to check a small drop of blood in a glucose monitor  Ask your healthcare provider when and how often to check during the day  Ask your healthcare provider what your blood sugar levels should be when you check them  · Keep track of carbohydrates (sugar and starchy foods)  Your blood sugar level can get too high if you eat too many carbohydrates   Your dietitian will help you plan meals and snacks that have the right amount of carbohydrates  · Eat low-fat foods  Some examples are skinless chicken and low-fat milk  · Eat less sodium (salt)  Some examples of high-sodium foods to limit are soy sauce, potato chips, and soup  Do not add salt to food you cook  Limit your use of table salt  · Eat high-fiber foods  Foods that are a good source of fiber include vegetables, whole grain bread, and beans  · Limit alcohol  Alcohol affects your blood sugar level and can make it harder to manage your diabetes  Women should limit alcohol to 1 drink a day  Men should limit alcohol to 2 drinks a day  A drink of alcohol is 12 ounces of beer, 5 ounces of wine, or 1½ ounces of liquor  · Get regular exercise  Exercise can help keep your blood sugar level steady, decrease your risk of heart disease, and help you lose weight  Exercise for at least 30 minutes, 5 days a week  Include muscle strengthening activities 2 days each week  Work with your healthcare provider to create an exercise plan  · Check your feet each day  for injuries or open sores  Ask your healthcare provider for activities you can do if you have an open sore  · Quit smoking  If you smoke, it is never too late to quit  Smoking can worsen the problems that may occur with diabetes  Ask your healthcare provider for information about how to stop smoking if you are having trouble quitting  · Ask about your weight:  Ask healthcare providers if you need to lose weight, and how much to lose  Ask them to help you with a weight loss program  Even a 10 to 15 pound weight loss can help you manage your blood sugar level  · Carry medical alert identification  Wear medical alert jewelry or carry a card that says you have diabetes  Ask your healthcare provider where to get these items  · Ask about vaccines  Diabetes puts you at risk of serious illness if you get the flu, pneumonia, or hepatitis   Ask your healthcare provider if you should get a flu, pneumonia, or hepatitis B vaccine, and when to get the vaccine  Follow up with your healthcare provider as directed:  Write down your questions so you remember to ask them during your visits  CARE AGREEMENT:   You have the right to help plan your care  Learn about your health condition and how it may be treated  Discuss treatment options with your caregivers to decide what care you want to receive  You always have the right to refuse treatment  The above information is an  only  It is not intended as medical advice for individual conditions or treatments  Talk to your doctor, nurse or pharmacist before following any medical regimen to see if it is safe and effective for you  © 2014 [address] is for End User's use only and may not be sold, redistributed or otherwise used for commercial purposes  All illustrations and images included in CareNotes® are the copyrighted property of [employer] D [employer]  or Lavon Hayes

## 2022-07-18 DIAGNOSIS — E11.42 TYPE 2 DIABETES MELLITUS WITH DIABETIC POLYNEUROPATHY, WITHOUT LONG-TERM CURRENT USE OF INSULIN (HCC): ICD-10-CM

## 2022-08-16 ENCOUNTER — VBI (OUTPATIENT)
Dept: ADMINISTRATIVE | Facility: OTHER | Age: 77
End: 2022-08-16

## 2022-09-01 ENCOUNTER — VBI (OUTPATIENT)
Dept: ADMINISTRATIVE | Facility: OTHER | Age: 77
End: 2022-09-01

## 2022-10-17 DIAGNOSIS — E78.2 MIXED HYPERLIPIDEMIA: ICD-10-CM

## 2022-10-17 DIAGNOSIS — E11.42 TYPE 2 DIABETES MELLITUS WITH DIABETIC POLYNEUROPATHY, WITHOUT LONG-TERM CURRENT USE OF INSULIN (HCC): ICD-10-CM

## 2022-10-17 RX ORDER — ATORVASTATIN CALCIUM 20 MG/1
20 TABLET, FILM COATED ORAL DAILY
Qty: 90 TABLET | Refills: 3 | Status: SHIPPED | OUTPATIENT
Start: 2022-10-17

## 2022-10-28 ENCOUNTER — VBI (OUTPATIENT)
Dept: ADMINISTRATIVE | Facility: OTHER | Age: 77
End: 2022-10-28

## 2022-11-09 ENCOUNTER — VBI (OUTPATIENT)
Dept: ADMINISTRATIVE | Facility: OTHER | Age: 77
End: 2022-11-09

## 2022-12-08 ENCOUNTER — VBI (OUTPATIENT)
Dept: ADMINISTRATIVE | Facility: OTHER | Age: 77
End: 2022-12-08

## 2022-12-23 ENCOUNTER — RA CDI HCC (OUTPATIENT)
Dept: OTHER | Facility: HOSPITAL | Age: 77
End: 2022-12-23

## 2022-12-23 NOTE — PROGRESS NOTES
[place] [id number]  coding opportunities          Chart Reviewed number of suggestions sent to Provider: 1     Patients Insurance     Medicare Insurance: [facility] Advantage          This is a reminder to address all [place] [address]  (risk adjustment) codes for the year 2023 as patient DIANE scores reset to zero      ---------------------------------------------------------------------    E11 51: Type 2 diabetes mellitus with diabetic peripheral angiopathy without gangrene ([facility] ) [500012]    DM & PVD are presumed to have a causal-effect relationship unless documented as unrelated

## 2022-12-27 ENCOUNTER — APPOINTMENT (OUTPATIENT)
Dept: LAB | Facility: MEDICAL CENTER | Age: 77
End: 2022-12-27

## 2022-12-27 DIAGNOSIS — R80.9 TYPE 2 DIABETES MELLITUS WITH MICROALBUMINURIA, WITHOUT LONG-TERM CURRENT USE OF INSULIN (HCC): ICD-10-CM

## 2022-12-27 DIAGNOSIS — E11.29 TYPE 2 DIABETES MELLITUS WITH MICROALBUMINURIA, WITHOUT LONG-TERM CURRENT USE OF INSULIN (HCC): ICD-10-CM

## 2022-12-27 DIAGNOSIS — L30.9 DERMATITIS: ICD-10-CM

## 2022-12-27 LAB
ALBUMIN SERPL BCP-MCNC: 4 G/DL (ref 3.5–5)
ALP SERPL-CCNC: 55 U/L (ref 46–116)
ALT SERPL W P-5'-P-CCNC: 57 U/L (ref 12–78)
ANION GAP SERPL CALCULATED.3IONS-SCNC: 5 MMOL/L (ref 4–13)
AST SERPL W P-5'-P-CCNC: 32 U/L (ref 5–45)
BASOPHILS # BLD AUTO: 0.09 THOUSANDS/ÂΜL (ref 0–0.1)
BASOPHILS NFR BLD AUTO: 1 % (ref 0–1)
BILIRUB SERPL-MCNC: 0.59 MG/DL (ref 0.2–1)
BUN SERPL-MCNC: 28 MG/DL (ref 5–25)
CALCIUM SERPL-MCNC: 10.4 MG/DL (ref 8.3–10.1)
CHLORIDE SERPL-SCNC: 104 MMOL/L (ref 96–108)
CHOLEST SERPL-MCNC: 123 MG/DL
CO2 SERPL-SCNC: 28 MMOL/L (ref 21–32)
CREAT SERPL-MCNC: 1.19 MG/DL (ref 0.6–1.3)
CREAT UR-MCNC: 111 MG/DL
EOSINOPHIL # BLD AUTO: 0.49 THOUSAND/ÂΜL (ref 0–0.61)
EOSINOPHIL NFR BLD AUTO: 7 % (ref 0–6)
ERYTHROCYTE [DISTWIDTH] IN BLOOD BY AUTOMATED COUNT: 15.1 % (ref 11.6–15.1)
EST. AVERAGE GLUCOSE BLD GHB EST-MCNC: 148 MG/DL
GFR SERPL CREATININE-BSD FRML MDRD: 59 ML/MIN/1.73SQ M
GLUCOSE P FAST SERPL-MCNC: 162 MG/DL (ref 65–99)
HBA1C MFR BLD: 6.8 %
HCT VFR BLD AUTO: 40.6 % (ref 36.5–49.3)
HDLC SERPL-MCNC: 33 MG/DL
HGB BLD-MCNC: 12.7 G/DL (ref 12–17)
IMM GRANULOCYTES # BLD AUTO: 0.04 THOUSAND/UL (ref 0–0.2)
IMM GRANULOCYTES NFR BLD AUTO: 1 % (ref 0–2)
LDLC SERPL CALC-MCNC: 41 MG/DL (ref 0–100)
LYMPHOCYTES # BLD AUTO: 2.1 THOUSANDS/ÂΜL (ref 0.6–4.47)
LYMPHOCYTES NFR BLD AUTO: 29 % (ref 14–44)
MCH RBC QN AUTO: 25.8 PG (ref 26.8–34.3)
MCHC RBC AUTO-ENTMCNC: 31.3 G/DL (ref 31.4–37.4)
MCV RBC AUTO: 83 FL (ref 82–98)
MICROALBUMIN UR-MCNC: 118 MG/L (ref 0–20)
MICROALBUMIN/CREAT 24H UR: 106 MG/G CREATININE (ref 0–30)
MONOCYTES # BLD AUTO: 0.45 THOUSAND/ÂΜL (ref 0.17–1.22)
MONOCYTES NFR BLD AUTO: 6 % (ref 4–12)
NEUTROPHILS # BLD AUTO: 4.14 THOUSANDS/ÂΜL (ref 1.85–7.62)
NEUTS SEG NFR BLD AUTO: 56 % (ref 43–75)
NONHDLC SERPL-MCNC: 90 MG/DL
NRBC BLD AUTO-RTO: 0 /100 WBCS
PLATELET # BLD AUTO: 171 THOUSANDS/UL (ref 149–390)
PMV BLD AUTO: 12.5 FL (ref 8.9–12.7)
POTASSIUM SERPL-SCNC: 4 MMOL/L (ref 3.5–5.3)
PROT SERPL-MCNC: 8 G/DL (ref 6.4–8.4)
RBC # BLD AUTO: 4.92 MILLION/UL (ref 3.88–5.62)
SODIUM SERPL-SCNC: 137 MMOL/L (ref 135–147)
TRIGL SERPL-MCNC: 244 MG/DL
WBC # BLD AUTO: 7.31 THOUSAND/UL (ref 4.31–10.16)

## 2022-12-27 RX ORDER — TRIAMCINOLONE ACETONIDE 1 MG/G
CREAM TOPICAL 2 TIMES DAILY
Qty: 80 G | Refills: 3 | Status: SHIPPED | OUTPATIENT
Start: 2022-12-27

## 2022-12-28 ENCOUNTER — VBI (OUTPATIENT)
Dept: ADMINISTRATIVE | Facility: OTHER | Age: 77
End: 2022-12-28

## 2023-01-02 NOTE — PROGRESS NOTES
Name: Laurie Benedict  : 1945      MRN: [id number]  Encounter Provider: Berlin Krabbe, MD  Encounter Date: 2023   Encounter department: [address] [place] [address]     1  Type 2 diabetes mellitus with microalbuminuria, without long-term current use of insulin (HCC)  -     Hemoglobin A1C    2  Diabetic polyneuropathy associated with type 2 diabetes mellitus ([facility] )    3  Primary hypertension  -     Comprehensive metabolic panel  -     CBC and differential    4  Mixed hyperlipidemia  -     Lipid panel  -     TSH, 3rd generation with Free T4 reflex    5  PAD (peripheral artery disease) ([facility] )    6  Chronic obstructive pulmonary disease, unspecified COPD type ([facility] )    7  FIOR (obstructive sleep apnea)    8  Gastroesophageal reflux disease without esophagitis    9  Kessler's esophagus without dysplasia    10  Fatty liver    Continue with current medications  Start Flonase nasal spray daily w/ PRN Coricidan  He is due for an eye exam  OV 6 months with labs  Monitor BP at home  Subjective     Follow up visit  Medications reviewed  Labs 2022 see note  Type 2 DM   on Metformin 850 mg BID  Side effects on 1,000 mg BID    A1c 6 8 improved from  7 1  Urine micro albumin 118 decreased from  139  on ARB  No hypoglycemic events  Mild neuropathy symptoms numbness- no pain  Lat eye exam 2018 no diabetic retinopathy  Hypertension blood pressures have been stable on current multi drug regimen creatinine 1 19  GFR 59  Prior GFRs normal    Electrolytes normal except for Ca++ 10 4    Hgb 12 7   Prior Nephrology evaluation and workup for secondary causes of Hypertension- metanephrine normal at 47  normetanephrine elevated at 183  renin 19 27  aldosterone 2 2 renal artery dopplers 2015 no ISIAH  kidneys hypertrophic  2 8 cm cyst left kidney  Mixed hyperlipidemia and PAD on Atorvastatin 20 mg/day-side effects from 40 mg dose- and 2 fish oil capsules a day   Lipid profile cholesterol 123  TGs 244 increased from 185  HDL 31  LDL 41  LFTs normal  06/2022 TSH 1 980  02/2020 CT scan abdomen and pelvis ordered by his surgeon for recurrent abdominal pain  Incidental finding significant triple-vessel coronary artery calcification  No pericardial effusion  Scattered sub 3 mm central lobular calcified and noncalcified pulmonary nodules  Atherosclerotic changes of aorta no aneurysm        Recent Results (from the past 672 hour(s))   CBC and differential    Collection Time: 12/27/22  8:18 AM   Result Value Ref Range    WBC 7 31 4 31 - 10 16 Thousand/uL    RBC 4 92 3 88 - 5 62 Million/uL    Hemoglobin 12 7 12 0 - 17 0 g/dL    Hematocrit 40 6 36 5 - 49 3 %    MCV 83 82 - 98 fL    MCH 25 8 (L) 26 8 - 34 3 pg    MCHC 31 3 (L) 31 4 - 37 4 g/dL    RDW 15 1 11 6 - 15 1 %    MPV 12 5 8 9 - 12 7 fL    Platelets 246 922 - 665 Thousands/uL    nRBC 0 /100 WBCs    Neutrophils Relative 56 43 - 75 %    Immat GRANS % 1 0 - 2 %    Lymphocytes Relative 29 14 - 44 %    Monocytes Relative 6 4 - 12 %    Eosinophils Relative 7 (H) 0 - 6 %    Basophils Relative 1 0 - 1 %    Neutrophils Absolute 4 14 1 85 - 7 62 Thousands/µL    Immature Grans Absolute 0 04 0 00 - 0 20 Thousand/uL    Lymphocytes Absolute 2 10 0 60 - 4 47 Thousands/µL    Monocytes Absolute 0 45 0 17 - 1 22 Thousand/µL    Eosinophils Absolute 0 49 0 00 - 0 61 Thousand/µL    Basophils Absolute 0 09 0 00 - 0 10 Thousands/µL   Comprehensive metabolic panel    Collection Time: 12/27/22  8:18 AM   Result Value Ref Range    Sodium 137 135 - 147 mmol/L    Potassium 4 0 3 5 - 5 3 mmol/L    Chloride 104 96 - 108 mmol/L    CO2 28 21 - 32 mmol/L    ANION GAP 5 4 - 13 mmol/L    BUN 28 (H) 5 - 25 mg/dL    Creatinine 1 19 0 60 - 1 30 mg/dL    Glucose, Fasting 162 (H) 65 - 99 mg/dL    Calcium 10 4 (H) 8 3 - 10 1 mg/dL    AST 32 5 - 45 U/L    ALT 57 12 - 78 U/L    Alkaline Phosphatase 55 46 - 116 U/L    Total Protein 8 0 6 4 - 8 4 g/dL    Albumin 4 0 3 5 - 5 0 g/dL    Total Bilirubin 0 59 0 20 - 1 00 mg/dL    eGFR 59 ml/min/1 73sq m   Hemoglobin A1C    Collection Time: 12/27/22  8:18 AM   Result Value Ref Range    Hemoglobin A1C 6 8 (H) Normal 3 8-5 6%; PreDiabetic 5 7-6 4%; Diabetic >=6 5%; Glycemic control for adults with diabetes <7 0% %     mg/dl   Lipid panel    Collection Time: 12/27/22  8:18 AM   Result Value Ref Range    Cholesterol 123 See Comment mg/dL    Triglycerides 244 (H) See Comment mg/dL    HDL, Direct 33 (L) >=40 mg/dL    LDL Calculated 41 0 - 100 mg/dL    Non-HDL-Chol (CHOL-HDL) 90 mg/dl   Microalbumin / creatinine urine ratio    Collection Time: 12/27/22  8:18 AM   Result Value Ref Range    Creatinine, Ur 111 0 mg/dL    Microalbum  ,U,Random 118 0 (H) 0 0 - 20 0 mg/L    Microalb Creat Ratio 106 (H) 0 - 30 mg/g creatinine     Lab Results   Component Value Date    [id number] 1 980 06/14/2022             Review of Systems   Constitutional: Positive for unexpected weight change (4 lb weight loss from 06/2022 )  Negative for appetite change, chills and fever  HENT: Positive for congestion  Negative for ear pain, rhinorrhea, sinus pain, sore throat and trouble swallowing          + chronic nasal congestion  He has been using PRN Coricidan    Eyes: Negative for visual disturbance  S/p cataract surgery OS   Respiratory: Positive for apnea  Negative for cough, shortness of breath and wheezing  FIOR/CPAP usage  FIOR on CPAP with auto adjustment feature  He is currently benefiting from his current use of CPAP with restorative sleep and no excessive daytime tiredness   COPD stable on Advair once a day   Cardiovascular: Negative for chest pain, palpitations and leg swelling  See HPI  PAD with intermittent leg claudications  no rest pain  pain left hip and calf pain after walking long distances  11/2016 arterial dopplers LEs  Diffuse heterogeneous plaque throughout both legs  Evidence of right lower extremity occlusive disease in mid SFA   aortoiliac study normal but limited views of proximal common iliac arteries due to body habitus admission 08/2015 for right-sided chest pain associated with increased shortness of breath and accelerated hypertension  He ruled out for MI  EKG no acute EKG changes  nuclear stress test normal  no perfusion abnormalities  EF 67%  Gastrointestinal: Negative for abdominal pain, blood in stool, constipation, diarrhea, nausea and vomiting  GERD/Kessler's  stable on Omeprazole  No dysphagia  Repeat EGD 12/2021 02/2020 CT scan abdomen and pelvis 3 8 cm exophytic left renal intra pole cyst   Atherosclerotic changes of abdominal aorta no aneurysm  Enlarged prostate  Small fat containing umbilical hernia  Small fat containing inguinal hernias  Well-differentiated neuroendocrine tumor low-grade  Fatty liver 12/2022 LFTs normal  Albumin 4 0  Platelets 140,115  31/0905 abdominal ultrasound normal except for mild diffuse fatty infiltration of liver  Colonoscopy 06/2014  1 colon polyp descending colon, diverticulosis and internal hemorrhoids  Endocrine: Negative for polydipsia and polyuria  Genitourinary: Negative for difficulty urinating  Lab Results       Component                Value               Date                       PSA                      1 5                 09/22/2020                 PSA                      0 7                 02/27/2015                 PSA                      0 7                 03/18/2014                      Musculoskeletal: Negative for arthralgias and myalgias  Skin: Negative for rash  Allergic/Immunologic: Negative for environmental allergies  Neurological: Positive for dizziness  Negative for weakness, light-headedness and headaches  Intermittent dizziness    Hematological: Negative for adenopathy  Does not bruise/bleed easily  Psychiatric/Behavioral: Negative for dysphoric mood and sleep disturbance         Past Medical History:   Diagnosis Date   • Accelerated essential hypertension     LAST ASSESSED 8/27/15   • Benign positional vertigo 2016   • Cataract, left eye 10/24/2017   • Diverticulosis    • Lumbar radiculopathy 2016   • Polyp of colon 2017    Overview:  Added automatically from request for surgery [id number]   • Prostatitis     LAST ASSESSED 3/17/15     Past Surgical History:   Procedure Laterality Date   • CARDIOVASCULAR STRESS TEST       Family History   Problem Relation Age of Onset   • Emphysema Father         LUNG   • Stroke Father      Social History     Socioeconomic History   • Marital status: /Civil Union     Spouse name: Not on file   • Number of children: Not on file   • Years of education: Not on file   • Highest education level: Not on file   Occupational History   • Not on file   Tobacco Use   • Smoking status: Former     Packs/day: 2 00     Years: 30 00     Pack years: 60 00     Types: Cigarettes     Quit date:      Years since quittin 0   • Smokeless tobacco: Never   Substance and Sexual Activity   • Alcohol use: No   • Drug use: No   • Sexual activity: Not on file   Other Topics Concern   • Not on file   Social History Narrative   • Not on file     Social Determinants of Health     Financial Resource Strain: Not on file   Food Insecurity: Not on file   Transportation Needs: Not on file   Physical Activity: Not on file   Stress: Not on file   Social Connections: Not on file   Intimate Partner Violence: Not on file   Housing Stability: Not on file     Current Outpatient Medications on File Prior to Visit   Medication Sig   • amLODIPine (NORVASC) 10 mg tablet Take 1 tablet (10 mg total) by mouth daily   • aspirin (ECOTRIN LOW STRENGTH) 81 mg EC tablet Take 81 mg by mouth daily   • atorvastatin (LIPITOR) 20 mg tablet Take 1 tablet (20 mg total) by mouth daily   • carvedilol (COREG) 25 mg tablet Take 1 tablet (25 mg total) by mouth 2 (two) times a day   • chlorthalidone 25 mg tablet Take 1 5 tablets (37 5 mg total) by mouth daily   • Cholecalciferol (VITAMIN D3) 1000 UNIT/SPRAY LIQD Take by mouth   • Cyanocobalamin (VITAMIN B-12) 1000 MCG/15ML LIQD Take 1 tablet by mouth daily   • fluticasone-salmeterol (Advair Diskus) 250-50 mcg/dose inhaler Inhale 1 puff 2 (two) times a day   • Folic Acid 20 MG CAPS Take 1 capsule by mouth daily   • metFORMIN (GLUCOPHAGE) 850 mg tablet Take 1 tablet (850 mg total) by mouth 2 (two) times a day with meals   • Omega-3 Fatty Acids (FISH OIL) 1,000 mg Take 1 capsule by mouth daily   • omeprazole (PriLOSEC) 40 MG capsule Take 1 capsule by mouth 2 (two) times a day   • polyethylene glycol-propylene glycol (SYSTANE) 0 4-0 3 % Apply to eye   • triamcinolone (KENALOG) 0 1 % cream Apply topically 2 (two) times a day   • valsartan (DIOVAN) 320 MG tablet Take 1 tablet (320 mg total) by mouth daily     No Known Allergies  Immunization History   Administered Date(s) Administered   • COVID-19 PFIZER VACCINE 0 3 ML IM 02/19/2021, 03/11/2021, 10/19/2021   • COVID-19 Pfizer Vac BIVALENT Roger-sucrose 12 Yr+ IM (BOOSTER ONLY) 10/08/2022   • H1N1, All Formulations 02/09/2010   • INFLUENZA 11/05/2019   • Influenza Split High Dose Preservative Free IM 09/23/2013, 10/01/2014, 11/15/2016, 10/24/2017, 11/05/2019   • Influenza, high dose seasonal 0 7 mL 11/19/2018, 11/05/2019, 08/24/2020, 09/22/2021   • Influenza, seasonal, injectable 09/06/2011, 09/11/2012   • Pneumococcal Conjugate 13-Valent 10/12/2015   • Pneumococcal Polysaccharide PPV23 09/06/2011, 11/15/2016       Objective     /66 (BP Location: Left arm, Patient Position: Sitting, Cuff Size: Standard)   Pulse 56   Temp (!) 97 °F (36 1 °C)   Ht 5' 9" (1 753 m)   Wt 106 kg (233 lb)   SpO2 96%   BMI 34 41 kg/m²       BP Readings from Last 3 Encounters:   01/04/23 120/66   06/23/22 118/72   12/14/21 138/60       Wt Readings from Last 3 Encounters:   01/04/23 106 kg (233 lb)   06/23/22 108 kg (237 lb)   12/14/21 106 kg (233 lb)     Physical Exam  Vitals and nursing note reviewed  Constitutional:       General: He is not in acute distress  Appearance: He is well-developed  HENT:      Right Ear: Tympanic membrane and ear canal normal       Left Ear: Tympanic membrane and ear canal normal       Nose:      Right Turbinates: Not swollen  Left Turbinates: Not swollen  Right Sinus: No maxillary sinus tenderness or frontal sinus tenderness  Left Sinus: No maxillary sinus tenderness or frontal sinus tenderness  Mouth/Throat:      Mouth: No oral lesions  Pharynx: Oropharynx is clear  Eyes:      General: No scleral icterus  Extraocular Movements: Extraocular movements intact  Conjunctiva/sclera: Conjunctivae normal       Pupils: Pupils are equal, round, and reactive to light  Neck:      Thyroid: No thyroid mass or thyromegaly  Vascular: No carotid bruit or JVD  Trachea: No tracheal deviation  Cardiovascular:      Rate and Rhythm: Normal rate and regular rhythm  Pulses: Pulses are weak  Carotid pulses are 2+ on the right side and 2+ on the left side  Dorsalis pedis pulses are 1+ on the right side and 0 on the left side  Posterior tibial pulses are 0 on the right side and 0 on the left side  Heart sounds: Normal heart sounds  No murmur heard  No gallop  Pulmonary:      Effort: Pulmonary effort is normal  No respiratory distress  Breath sounds: Normal breath sounds  No wheezing or rales  Abdominal:      General: There is no abdominal bruit  Palpations: There is no hepatomegaly or splenomegaly  Hernia: A hernia is present  Comments: Small umbilical hernia    Musculoskeletal:      Right lower leg: No edema  Left lower leg: No edema  Feet:      Right foot:      Skin integrity: No ulcer, skin breakdown, erythema, warmth, callus or dry skin        Left foot:      Skin integrity: No ulcer, skin breakdown, erythema, warmth, callus or dry skin  Lymphadenopathy:      Cervical: No cervical adenopathy  Upper Body:      Right upper body: No supraclavicular adenopathy  Left upper body: No supraclavicular adenopathy  Skin:     Findings: No rash  Nails: There is no clubbing  Neurological:      General: No focal deficit present  Mental Status: He is alert and oriented to person, place, and time  Psychiatric:         Mood and Affect: Mood normal          Behavior: Behavior normal        Patient's shoes and socks removed  Right Foot/Ankle   Right Foot Inspection  Skin Exam: skin normal and skin intact  No dry skin, no warmth, no callus, no erythema, no maceration, no abnormal color, no pre-ulcer, no ulcer and no callus  Toe Exam: ROM and strength within normal limits  No swelling, no tenderness, erythema and  no right toe deformity    Sensory   Monofilament testing: intact    Vascular  Capillary refills: < 3 seconds  The right DP pulse is 1+  The right PT pulse is 0  Left Foot/Ankle  Left Foot Inspection  Skin Exam: skin normal and skin intact  No dry skin, no warmth, no erythema, no maceration, normal color, no pre-ulcer, no ulcer and no callus  Toe Exam: ROM and strength within normal limits  No swelling, no tenderness, no erythema and no left toe deformity  Sensory   Monofilament testing: intact    Vascular  Capillary refills: < 3 seconds  The left DP pulse is 0  The left PT pulse is 0       Assign Risk Category  No deformity present  No loss of protective sensation  Weak pulses  Risk: 2        Kari Samuel MD

## 2023-01-04 ENCOUNTER — OFFICE VISIT (OUTPATIENT)
Dept: FAMILY MEDICINE CLINIC | Facility: CLINIC | Age: 78
End: 2023-01-04

## 2023-01-04 VITALS
OXYGEN SATURATION: 96 % | HEIGHT: 69 IN | WEIGHT: 233 LBS | DIASTOLIC BLOOD PRESSURE: 66 MMHG | BODY MASS INDEX: 34.51 KG/M2 | TEMPERATURE: 97 F | SYSTOLIC BLOOD PRESSURE: 120 MMHG | HEART RATE: 56 BPM

## 2023-01-04 DIAGNOSIS — E11.42 DIABETIC POLYNEUROPATHY ASSOCIATED WITH TYPE 2 DIABETES MELLITUS (HCC): ICD-10-CM

## 2023-01-04 DIAGNOSIS — R80.9 TYPE 2 DIABETES MELLITUS WITH MICROALBUMINURIA, WITHOUT LONG-TERM CURRENT USE OF INSULIN (HCC): Primary | ICD-10-CM

## 2023-01-04 DIAGNOSIS — K21.9 GASTROESOPHAGEAL REFLUX DISEASE WITHOUT ESOPHAGITIS: ICD-10-CM

## 2023-01-04 DIAGNOSIS — I73.9 PAD (PERIPHERAL ARTERY DISEASE) (HCC): ICD-10-CM

## 2023-01-04 DIAGNOSIS — E11.29 TYPE 2 DIABETES MELLITUS WITH MICROALBUMINURIA, WITHOUT LONG-TERM CURRENT USE OF INSULIN (HCC): Primary | ICD-10-CM

## 2023-01-04 DIAGNOSIS — K22.70 BARRETT'S ESOPHAGUS WITHOUT DYSPLASIA: ICD-10-CM

## 2023-01-04 DIAGNOSIS — I10 PRIMARY HYPERTENSION: ICD-10-CM

## 2023-01-04 DIAGNOSIS — G47.33 OSA (OBSTRUCTIVE SLEEP APNEA): ICD-10-CM

## 2023-01-04 DIAGNOSIS — J44.9 CHRONIC OBSTRUCTIVE PULMONARY DISEASE, UNSPECIFIED COPD TYPE (HCC): ICD-10-CM

## 2023-01-04 DIAGNOSIS — E78.2 MIXED HYPERLIPIDEMIA: ICD-10-CM

## 2023-01-04 DIAGNOSIS — K76.0 FATTY LIVER: ICD-10-CM

## 2023-02-08 DIAGNOSIS — I10 ESSENTIAL HYPERTENSION: ICD-10-CM

## 2023-02-08 RX ORDER — AMLODIPINE BESYLATE 10 MG/1
10 TABLET ORAL DAILY
Qty: 90 TABLET | Refills: 3 | Status: SHIPPED | OUTPATIENT
Start: 2023-02-08

## 2023-03-08 DIAGNOSIS — J44.9 CHRONIC OBSTRUCTIVE PULMONARY DISEASE, UNSPECIFIED COPD TYPE (HCC): ICD-10-CM

## 2023-03-08 RX ORDER — FLUTICASONE PROPIONATE AND SALMETEROL 250; 50 UG/1; UG/1
1 POWDER RESPIRATORY (INHALATION) 2 TIMES DAILY
Qty: 180 BLISTER | Refills: 3 | Status: SHIPPED | OUTPATIENT
Start: 2023-03-08

## 2023-03-16 DIAGNOSIS — E11.42 TYPE 2 DIABETES MELLITUS WITH DIABETIC POLYNEUROPATHY, WITHOUT LONG-TERM CURRENT USE OF INSULIN (HCC): ICD-10-CM

## 2023-03-16 DIAGNOSIS — I10 ESSENTIAL HYPERTENSION: ICD-10-CM

## 2023-03-16 RX ORDER — CARVEDILOL 25 MG/1
25 TABLET ORAL 2 TIMES DAILY
Qty: 180 TABLET | Refills: 3 | Status: SHIPPED | OUTPATIENT
Start: 2023-03-16

## 2023-03-28 DIAGNOSIS — I10 ESSENTIAL HYPERTENSION: ICD-10-CM

## 2023-03-28 RX ORDER — VALSARTAN 320 MG/1
320 TABLET ORAL DAILY
Qty: 90 TABLET | Refills: 3 | Status: SHIPPED | OUTPATIENT
Start: 2023-03-28

## 2023-04-24 DIAGNOSIS — I10 ESSENTIAL HYPERTENSION: ICD-10-CM

## 2023-04-24 RX ORDER — CHLORTHALIDONE 25 MG/1
37.5 TABLET ORAL DAILY
Qty: 135 TABLET | Refills: 3 | OUTPATIENT
Start: 2023-04-24

## 2023-04-25 DIAGNOSIS — I10 ESSENTIAL HYPERTENSION: ICD-10-CM

## 2023-04-25 RX ORDER — CHLORTHALIDONE 25 MG/1
37.5 TABLET ORAL DAILY
Qty: 135 TABLET | Refills: 3 | Status: SHIPPED | OUTPATIENT
Start: 2023-04-25

## 2023-04-25 NOTE — TELEPHONE ENCOUNTER
Patient called in yesterday for a refill and it was denied   [facility]

## 2023-07-03 ENCOUNTER — RA CDI HCC (OUTPATIENT)
Dept: OTHER | Facility: HOSPITAL | Age: 78
End: 2023-07-03

## 2023-07-03 PROCEDURE — 88305 TISSUE EXAM BY PATHOLOGIST: CPT | Performed by: STUDENT IN AN ORGANIZED HEALTH CARE EDUCATION/TRAINING PROGRAM

## 2023-07-03 NOTE — PROGRESS NOTES
[address] coding opportunities       Chart Reviewed number of suggestions sent to Provider: 1     Patients Insurance     Medicare Insurance: [employer] Medicare Advantage            E11.51: Type 2 diabetes mellitus with diabetic peripheral angiopathy without gangrene ([address]) [[id number]]     DM & PVD are presumed to have a causal-effect relationship unless documented as unrelated

## 2023-07-06 ENCOUNTER — APPOINTMENT (OUTPATIENT)
Dept: LAB | Facility: MEDICAL CENTER | Age: 78
End: 2023-07-06
Payer: COMMERCIAL

## 2023-07-06 ENCOUNTER — LAB REQUISITION (OUTPATIENT)
Dept: LAB | Facility: HOSPITAL | Age: 78
End: 2023-07-06
Payer: COMMERCIAL

## 2023-07-06 DIAGNOSIS — D48.5 NEOPLASM OF UNCERTAIN BEHAVIOR OF SKIN: ICD-10-CM

## 2023-07-06 LAB
ALBUMIN SERPL BCP-MCNC: 3.7 G/DL (ref 3.5–5)
ALP SERPL-CCNC: 51 U/L (ref 46–116)
ALT SERPL W P-5'-P-CCNC: 40 U/L (ref 12–78)
ANION GAP SERPL CALCULATED.3IONS-SCNC: 7 MMOL/L
AST SERPL W P-5'-P-CCNC: 27 U/L (ref 5–45)
BASOPHILS # BLD AUTO: 0.08 THOUSANDS/ÂΜL (ref 0–0.1)
BASOPHILS NFR BLD AUTO: 1 % (ref 0–1)
BILIRUB SERPL-MCNC: 0.45 MG/DL (ref 0.2–1)
BUN SERPL-MCNC: 27 MG/DL (ref 5–25)
CALCIUM SERPL-MCNC: 9.5 MG/DL (ref 8.3–10.1)
CHLORIDE SERPL-SCNC: 106 MMOL/L (ref 96–108)
CHOLEST SERPL-MCNC: 118 MG/DL
CO2 SERPL-SCNC: 26 MMOL/L (ref 21–32)
CREAT SERPL-MCNC: 1.14 MG/DL (ref 0.6–1.3)
EOSINOPHIL # BLD AUTO: 0.65 THOUSAND/ÂΜL (ref 0–0.61)
EOSINOPHIL NFR BLD AUTO: 8 % (ref 0–6)
ERYTHROCYTE [DISTWIDTH] IN BLOOD BY AUTOMATED COUNT: 15.9 % (ref 11.6–15.1)
GFR SERPL CREATININE-BSD FRML MDRD: 62 ML/MIN/1.73SQ M
GLUCOSE P FAST SERPL-MCNC: 156 MG/DL (ref 65–99)
HCT VFR BLD AUTO: 37.1 % (ref 36.5–49.3)
HDLC SERPL-MCNC: 33 MG/DL
HGB BLD-MCNC: 11.1 G/DL (ref 12–17)
IMM GRANULOCYTES # BLD AUTO: 0.04 THOUSAND/UL (ref 0–0.2)
IMM GRANULOCYTES NFR BLD AUTO: 1 % (ref 0–2)
LDLC SERPL CALC-MCNC: 45 MG/DL (ref 0–100)
LYMPHOCYTES # BLD AUTO: 2.08 THOUSANDS/ÂΜL (ref 0.6–4.47)
LYMPHOCYTES NFR BLD AUTO: 27 % (ref 14–44)
MCH RBC QN AUTO: 24.3 PG (ref 26.8–34.3)
MCHC RBC AUTO-ENTMCNC: 29.9 G/DL (ref 31.4–37.4)
MCV RBC AUTO: 81 FL (ref 82–98)
MONOCYTES # BLD AUTO: 0.41 THOUSAND/ÂΜL (ref 0.17–1.22)
MONOCYTES NFR BLD AUTO: 5 % (ref 4–12)
NEUTROPHILS # BLD AUTO: 4.5 THOUSANDS/ÂΜL (ref 1.85–7.62)
NEUTS SEG NFR BLD AUTO: 58 % (ref 43–75)
NONHDLC SERPL-MCNC: 85 MG/DL
NRBC BLD AUTO-RTO: 0 /100 WBCS
PLATELET # BLD AUTO: 180 THOUSANDS/UL (ref 149–390)
PMV BLD AUTO: 13 FL (ref 8.9–12.7)
POTASSIUM SERPL-SCNC: 3.9 MMOL/L (ref 3.5–5.3)
PROT SERPL-MCNC: 7.3 G/DL (ref 6.4–8.4)
RBC # BLD AUTO: 4.56 MILLION/UL (ref 3.88–5.62)
SODIUM SERPL-SCNC: 139 MMOL/L (ref 135–147)
TRIGL SERPL-MCNC: 199 MG/DL
TSH SERPL DL<=0.05 MIU/L-ACNC: 3.21 UIU/ML (ref 0.45–4.5)
WBC # BLD AUTO: 7.76 THOUSAND/UL (ref 4.31–10.16)

## 2023-07-08 LAB
EST. AVERAGE GLUCOSE BLD GHB EST-MCNC: 163 MG/DL
HBA1C MFR BLD: 7.3 %

## 2023-07-11 PROCEDURE — 88305 TISSUE EXAM BY PATHOLOGIST: CPT | Performed by: STUDENT IN AN ORGANIZED HEALTH CARE EDUCATION/TRAINING PROGRAM

## 2023-07-12 NOTE — PROGRESS NOTES
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 ([address])    Type 2 diabetes mellitus with microalbuminuria, without long-term current use of insulin (HCC)    Relevant Orders    Hemoglobin A1C    Albumin / creatinine urine ratio    Hemoglobin A1C       Respiratory    FIOR (obstructive sleep apnea)    Chronic obstructive pulmonary disease, unspecified (HCC)       Cardiovascular and Mediastinum    Hypertension    Relevant Orders    Comprehensive metabolic panel    CBC and differential    PAD (peripheral artery disease) ([address])       Other    Mixed hyperlipidemia    Relevant Orders    Lipid panel    Obesity, morbid ([address])   Other Visit Diagnoses     Microcytic anemia    -  Primary    Relevant Orders    Iron Panel (Includes Ferritin, Iron Sat%, Iron, and TIBC)    Vitamin B12    Reticulocytes    Protein electrophoresis, serum    Folate    Medicare annual wellness visit, subsequent            Continue with current medications. Additional labs for anemia work-up. Repeat A1c in 3 months. Office visit 6 months with labs. He is due for an eye exam.     Preventive health issues were discussed with patient, and age appropriate screening tests were ordered as noted in patient's After Visit Summary. Personalized health advice and appropriate referrals for health education or preventive services given if needed, as noted in patient's After Visit Summary. History of Present Illness:     Patient presents for a Medicare Wellness Visit    Follow up visit. Medications reviewed. Labs 07/2023 see note. Type 2 DM   on Metformin 850 mg BID. Side effects on 1,000 mg BID. . A1c 7.3 increased from 6.8 12/2022 Urine micro albumin 118 decreased from  139  on ARB. No hypoglycemic events. Mild neuropathy symptoms numbness- no pain. Lat eye exam 08/2018 no diabetic retinopathy.   Hypertension blood pressures have been stable on current multi drug regimen creatinine 1.14. GFR 62. Electrolytes normal . Hgb 11.1 . Prior Nephrology evaluation and workup for secondary causes of Hypertension- metanephrine normal at 47. normetanephrine elevated at 183. renin 19.27. aldosterone 2.2. Renal artery dopplers 03/2015 no ISIAH. kidneys hypertrophic. 2.8 cm cyst left kidney. Mixed hyperlipidemia and PAD on Atorvastatin 20 mg/day-side effects from 40 mg dose- and 2 fish oil capsules a day. Lipid profile cholesterol 118. TGs 199 decreased from 244. HDL 33. LDL 45. LFTs normal. 06/2022 TSH 1.980. 02/2020 CT scan abdomen and pelvis ordered by his surgeon for recurrent abdominal pain. Incidental finding significant triple-vessel coronary artery calcification. No pericardial effusion. Scattered sub 3 mm central lobular calcified and noncalcified pulmonary nodules. Atherosclerotic changes of aorta no aneurysm. Recent Results (from the past 672 hour(s))   Tissue Exam    Collection Time: 07/03/23  4:00 PM   Result Value Ref Range    Case Report       Surgical Pathology Report                         Case: [id number]                                   Authorizing Provider:  Shama Segovia MD      Collected:           07/03/2023 1600              Ordering Location:     [facility]      Received:            07/06/2023 [address] [facility]                                                                                  Laboratory                                                                   Pathologist:           Gray Marcelino MD                                                           Specimen:    Ear, Left, Left ear lesion                                                                 Final Diagnosis       A. Skin, left ear:    BASAL CELL CARCINOMA (SUPERFICIAL TYPE); extends to peripheral specimen margin.            Additional Information       All reported additional testing was performed with appropriately reactive controls. These tests were developed and their performance characteristics determined by [facility] or appropriate performing facility, though some tests may be performed on tissues which have not been validated for performance characteristics (such as staining performed on alcohol exposed cell blocks and decalcified tissues). Results should be interpreted with caution and in the context of the patients’ clinical condition. These tests may not be cleared or approved by the U.S. Food and Drug Administration, though the FDA has determined that such clearance or approval is not necessary. These tests are used for clinical purposes and they should not be regarded as investigational or for research. This laboratory has been approved by [place], designated as a high-complexity laboratory and is qualified to perform these tests. Lurena Mcburney Description       A. The specimen is received in formalin, labeled with the patient's name and hospital number, and is designated " left ear lesion". The specimen consists of a tan portion of skin measuring 1.8 x 0.8 x 0.1 cm. The epithelial surface exhibits a tan-white papule measuring 0.9 x 0.8 x 0.1 cm which is 0.1 cm from the nearest peripheral margin. The apparent margin of resection is inked green. The specimen is serially sectioned revealing tan-white cut surfaces. Entirely submitted. Three cassettes between sponges. 1: Tips  2-3: Center with papule    Note: The estimated total formalin fixation time based upon information provided by the submitting clinician and the standard processing schedule is under 72 hours.  [facility]       Comprehensive metabolic panel    Collection Time: 07/06/23  7:22 AM   Result Value Ref Range    Sodium 139 135 - 147 mmol/L    Potassium 3.9 3.5 - 5.3 mmol/L    Chloride 106 96 - 108 mmol/L    CO2 26 21 - 32 mmol/L    ANION GAP 7 mmol/L    BUN 27 (H) 5 - 25 mg/dL    Creatinine 1.14 0.60 - 1.30 mg/dL Glucose, Fasting 156 (H) 65 - 99 mg/dL    Calcium 9.5 8.3 - 10.1 mg/dL    AST 27 5 - 45 U/L    ALT 40 12 - 78 U/L    Alkaline Phosphatase 51 46 - 116 U/L    Total Protein 7.3 6.4 - 8.4 g/dL    Albumin 3.7 3.5 - 5.0 g/dL    Total Bilirubin 0.45 0.20 - 1.00 mg/dL    eGFR 62 ml/min/1.73sq m   CBC and differential    Collection Time: 07/06/23  7:22 AM   Result Value Ref Range    WBC 7.76 4.31 - 10.16 Thousand/uL    RBC 4.56 3.88 - 5.62 Million/uL    Hemoglobin 11.1 (L) 12.0 - 17.0 g/dL    Hematocrit 37.1 36.5 - 49.3 %    MCV 81 (L) 82 - 98 fL    MCH 24.3 (L) 26.8 - 34.3 pg    MCHC 29.9 (L) 31.4 - 37.4 g/dL    RDW 15.9 (H) 11.6 - 15.1 %    MPV 13.0 (H) 8.9 - 12.7 fL    Platelets 167 222 - 967 Thousands/uL    nRBC 0 /100 WBCs    Neutrophils Relative 58 43 - 75 %    Immat GRANS % 1 0 - 2 %    Lymphocytes Relative 27 14 - 44 %    Monocytes Relative 5 4 - 12 %    Eosinophils Relative 8 (H) 0 - 6 %    Basophils Relative 1 0 - 1 %    Neutrophils Absolute 4.50 1.85 - 7.62 Thousands/µL    Immature Grans Absolute 0.04 0.00 - 0.20 Thousand/uL    Lymphocytes Absolute 2.08 0.60 - 4.47 Thousands/µL    Monocytes Absolute 0.41 0.17 - 1.22 Thousand/µL    Eosinophils Absolute 0.65 (H) 0.00 - 0.61 Thousand/µL    Basophils Absolute 0.08 0.00 - 0.10 Thousands/µL   Hemoglobin A1C    Collection Time: 07/06/23  7:22 AM   Result Value Ref Range    Hemoglobin A1C 7.3 (H) Normal 3.8-5.6%; PreDiabetic 5.7-6.4%;  Diabetic >=6.5%; Glycemic control for adults with diabetes <7.0% %     mg/dl   Lipid panel    Collection Time: 07/06/23  7:22 AM   Result Value Ref Range    Cholesterol 118 See Comment mg/dL    Triglycerides 199 (H) See Comment mg/dL    HDL, Direct 33 (L) >=40 mg/dL    LDL Calculated 45 0 - 100 mg/dL    Non-HDL-Chol (CHOL-HDL) 85 mg/dl   TSH, 3rd generation with Free T4 reflex    Collection Time: 07/06/23  7:22 AM   Result Value Ref Range    TSH 3RD GENERATON 3.215 0.450 - 4.500 uIU/mL         Patient Care Team:  Zulma Vega, MD as PCP - Chrissie Seay MD as PCP - PCP-Luz Marina Jeronimo MD  T Marciano Argueta MD (General Surgery)     Review of Systems:     Review of Systems   Constitutional: Positive for fatigue. Negative for appetite change, chills, fever and unexpected weight change. HENT: Negative for congestion, ear pain, rhinorrhea, sinus pain, sore throat and trouble swallowing.         + chronic nasal congestion. He has been using PRN Coricidan    Eyes: Negative for visual disturbance. S/p cataract surgery OS   Respiratory: Positive for apnea and shortness of breath (+ GALVAN no changes ). Negative for cough and wheezing. FIOR/CPAP usage. FIOR on CPAP with auto adjustment feature. He is currently benefiting from his current use of CPAP with restorative sleep and no excessive daytime tiredness   COPD stable on Advair once a day   Cardiovascular: Negative for chest pain, palpitations and leg swelling. See HPI. PAD with intermittent leg claudications. no rest pain. pain left hip and calf pain after walking long distances. 11/2016 arterial dopplers LEs. Diffuse heterogeneous plaque throughout both legs. Evidence of right lower extremity occlusive disease in mid SFA. aortoiliac study normal but limited views of proximal common iliac arteries due to body habitus admission 08/2015 for right-sided chest pain associated with increased shortness of breath and accelerated hypertension. He ruled out for MI. EKG no acute EKG changes. nuclear stress test normal. no perfusion abnormalities. EF 67%. Gastrointestinal: Negative for abdominal pain, blood in stool, constipation, diarrhea, nausea and vomiting. GERD/Kessler's  stable on Omeprazole. No dysphagia. Repeat EGD 06/2023. 02/2020 CT scan abdomen and pelvis 3.8 cm exophytic left renal intra pole cyst.  Atherosclerotic changes of abdominal aorta no aneurysm. Enlarged prostate. Small fat containing umbilical hernia.   Small fat containing inguinal hernias. Well-differentiated neuroendocrine tumor low-grade. Fatty liver 07/2023 LFTs normal  01/2018 abdominal ultrasound normal except for mild diffuse fatty infiltration of liver. History of colon polyps- Colonoscopy 09/2022. Endocrine: Negative for polydipsia and polyuria. Genitourinary: Negative for difficulty urinating. Lab Results       Component                Value               Date                       PSA                      1.5                 09/22/2020                 PSA                      0.7                 02/27/2015                 PSA                      0.7                 03/18/2014                      Musculoskeletal: Negative for arthralgias and myalgias. Skin: Negative for rash. 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 Neurological: Positive for light-headedness. Negative for dizziness, weakness and headaches. Hematological: Negative for adenopathy. Does not bruise/bleed easily. 07/2023 CBC shows a drop in hemoglobin. No rectal bleeding or melena. GERD on Omeprazole 40 mg twice daily. EGD 6/2023 gastritis/gastric polyps. Hiatal hernia 3 cm. .9/2022 colonoscopy normal except for diverticulosis.          Component                Value               Date                       WBC                      7.76                07/06/2023                 HGB                      11.1 (L)            07/06/2023                 HCT                      37.1                07/06/2023                 MCV                      81 (L)              07/06/2023                 PLT                      180                 07/06/2023              Hemoglobin       Date                     Value               Ref Range           Status                07/06/2023               11.1 (L)            12.0 - 17.0 g/*     Final                 12/27/2022 12.7                12.0 - 17.0 g/*     Final                 06/14/2022               13.0                12.0 - 17.0 g/*     Final                 08/03/2015               15.3                12.0 - 17.0 g/*     Final                 08/02/2015               16.4                12.0 - 17.0 g/*     Final                 02/27/2015               15.3                12.0 - 17.0 g/*     Final                 Psychiatric/Behavioral: Negative for dysphoric mood and sleep disturbance.         Problem List:     Patient Active Problem List   Diagnosis   • Mixed hyperlipidemia   • Hypertension   • FIOR (obstructive sleep apnea)   • Kessler's esophagus without dysplasia   • Gastroesophageal reflux disease   • Fatty liver   • Eczema   • Degenerative joint disease of hand   • PAD (peripheral artery disease) (HCC)   • Chronic obstructive pulmonary disease, unspecified (HCC)   • Diabetic neuropathy associated with type 2 diabetes mellitus (HCC)   • Type 2 diabetes mellitus with microalbuminuria, without long-term current use of insulin (HCC)   • Diaphragmatic hernia   • Mucous retention cyst of salivary gland   • Neuroendocrine tumor   • Obesity, morbid ([address])      Past Medical and Surgical History:     Past Medical History:   Diagnosis Date   • Accelerated essential hypertension     LAST ASSESSED 8/27/15   • Benign positional vertigo 8/5/2016   • Cataract, left eye 10/24/2017   • Diverticulosis    • Lumbar radiculopathy 12/7/2016   • Polyp of colon 6/5/2017    Overview:  Added automatically from request for surgery [id number]   • Prostatitis     LAST ASSESSED 3/17/15     Past Surgical History:   Procedure Laterality Date   • CARDIOVASCULAR STRESS TEST        Family History:     Family History   Problem Relation Age of Onset   • Emphysema Father         LUNG   • Stroke Father       Social History:     Social History     Socioeconomic History   • Marital status: /Civil Union     Spouse name: None   • Number of children: None   • Years of education: None   • Highest education level: None   Occupational History   • None   Tobacco Use   • Smoking status: Former     Packs/day: 2.00     Years: 30.00     Total pack years: 60.00     Types: Cigarettes     Quit date:      Years since quittin.5   • Smokeless tobacco: Never   Substance and Sexual Activity   • Alcohol use: No   • Drug use: No   • Sexual activity: None   Other Topics Concern   • None   Social History Narrative   • None     Social Determinants of Health     Financial Resource Strain: Low Risk  (2023)    Overall Financial Resource Strain (CARDIA)    • Difficulty of Paying Living Expenses: Not very hard   Food Insecurity: Not on file   Transportation Needs: No Transportation Needs (2023)    PRAPARE - Transportation    • Lack of Transportation (Medical): No    • Lack of Transportation (Non-Medical):  No   Physical Activity: Not on file   Stress: Not on file   Social Connections: Not on file   Intimate Partner Violence: Not on file   Housing Stability: Not on file      Medications and Allergies:     Current Outpatient Medications   Medication Sig Dispense Refill   • amLODIPine (NORVASC) 10 mg tablet Take 1 tablet (10 mg total) by mouth daily 90 tablet 3   • aspirin (ECOTRIN LOW STRENGTH) 81 mg EC tablet Take 81 mg by mouth daily     • atorvastatin (LIPITOR) 20 mg tablet Take 1 tablet (20 mg total) by mouth daily 90 tablet 3   • carvedilol (COREG) 25 mg tablet Take 1 tablet (25 mg total) by mouth 2 (two) times a day 180 tablet 3   • chlorthalidone 25 mg tablet Take 1.5 tablets (37.5 mg total) by mouth daily 135 tablet 3   • Cholecalciferol (VITAMIN D3) 1000 UNIT/SPRAY LIQD Take by mouth     • Cyanocobalamin (VITAMIN B-12) 1000 MCG/15ML LIQD Take 1 tablet by mouth daily     • Fluticasone-Salmeterol (Advair Diskus) 250-50 mcg/dose inhaler Inhale 1 puff 2 (two) times a day 180 blister 3   • Folic Acid 20 MG CAPS Take 1 capsule by mouth daily     • metFORMIN (GLUCOPHAGE) 850 mg tablet Take 1 tablet (850 mg total) by mouth 2 (two) times a day with meals 180 tablet 3   • Omega-3 Fatty Acids (FISH OIL) 1,000 mg Take 1 capsule by mouth daily     • omeprazole (PriLOSEC) 40 MG capsule Take 1 capsule by mouth 2 (two) times a day     • polyethylene glycol-propylene glycol (SYSTANE) 0.4-0.3 % Apply to eye     • triamcinolone (KENALOG) 0.1 % cream Apply topically 2 (two) times a day 80 g 3   • valsartan (DIOVAN) 320 MG tablet Take 1 tablet (320 mg total) by mouth daily 90 tablet 3     No current facility-administered medications for this visit. No Known Allergies   Immunizations:     Immunization History   Administered Date(s) Administered   • COVID-19 PFIZER VACCINE 0.3 ML IM 02/19/2021, 03/11/2021, 10/19/2021   • COVID-19 Pfizer Vac BIVALENT Roger-sucrose 12 Yr+ IM (BOOSTER ONLY) 10/08/2022   • H1N1, All Formulations 02/09/2010   • INFLUENZA 11/05/2019   • Influenza Split High Dose Preservative Free IM 09/23/2013, 10/01/2014, 11/15/2016, 10/24/2017, 11/05/2019   • Influenza, high dose seasonal 0.7 mL 11/19/2018, 11/05/2019, 08/24/2020, 09/22/2021   • Influenza, seasonal, injectable 09/06/2011, 09/11/2012   • Pneumococcal Conjugate 13-Valent 10/12/2015   • Pneumococcal Polysaccharide PPV23 09/06/2011, 11/15/2016      Health Maintenance:         Topic Date Due   • Hepatitis C Screening  Addressed   • Colorectal Cancer Screening  Discontinued         Topic Date Due   • Influenza Vaccine (1) 09/01/2023      Medicare Screening Tests and Risk Assessments:     Lisa Read is here for his Subsequent Wellness visit. Last Medicare Wellness visit information reviewed, patient interviewed and updates made to the record today. Health Risk Assessment:   Patient rates overall health as fair. Patient feels that their physical health rating is slightly worse. Patient is dissatisfied with their life. Eyesight was rated as slightly worse. Hearing was rated as slightly worse.  Patient feels that their emotional and mental health rating is same. Patients states they are never, rarely angry. Patient states they are often unusually tired/fatigued. Pain experienced in the last 7 days has been some. Patient's pain rating has been 3/10. Patient states that he has experienced weight loss or gain in last 6 months. Fall Risk Screening: In the past year, patient has experienced: no history of falling in past year      Home Safety:  Patient does not have trouble with stairs inside or outside of their home. Patient has working smoke alarms and has no working carbon monoxide detector. Home safety hazards include: none and medications that cause fatigue. Nutrition:   Current diet is Diabetic, Unhealthy and Limited junk food. Medications:   Patient is currently taking over-the-counter supplements. OTC medications include: Cbd. Neuropure. Patient is able to manage medications. Activities of Daily Living (ADLs)/Instrumental Activities of Daily Living (IADLs):   Walk and transfer into and out of bed and chair?: Yes  Dress and groom yourself?: Yes    Bathe or shower yourself?: Yes    Feed yourself?  Yes  Do your laundry/housekeeping?: Yes  Manage your money, pay your bills and track your expenses?: Yes  Make your own meals?: Yes    Do your own shopping?: Yes    Previous Hospitalizations:   Any hospitalizations or ED visits within the last 12 months?: No      Advance Care Planning:   Living will: No    Durable POA for healthcare: No    Advanced directive: No      PREVENTIVE SCREENINGS      Cardiovascular Screening:    General: Screening Not Indicated and History Lipid Disorder      Diabetes Screening:     General: Screening Not Indicated and History Diabetes      Prostate Cancer Screening:    General: Screening Not Indicated      Abdominal Aortic Aneurysm (AAA) Screening:    Risk factors include: tobacco use        Lung Cancer Screening:     General: Screening Not Indicated      Hepatitis C Screening:    General: Screening Current    Screening, Brief Intervention, and Referral to Treatment (SBIRT)    Screening  Typical number of drinks in a day: 0  Typical number of drinks in a week: 0  Interpretation: Low risk drinking behavior. AUDIT-C Screenin) How often did you have a drink containing alcohol in the past year? never  2) How many drinks did you have on a typical day when you were drinking in the past year? 0  3) How often did you have 6 or more drinks on one occasion in the past year? never    AUDIT-C Score: 0  Interpretation: Score 0-3 (male): Negative screen for alcohol misuse    Single Item Drug Screening:  How often have you used an illegal drug (including marijuana) or a prescription medication for non-medical reasons in the past year? never    Single Item Drug Screen Score: 0  Interpretation: Negative screen for possible drug use disorder    Other Counseling Topics:   Regular weightbearing exercise and calcium and vitamin D intake. No results found. Physical Exam:     /68 (BP Location: Left arm, Patient Position: Sitting, Cuff Size: Large)   Pulse (!) 50   Temp 97.7 °F (36.5 °C)   Ht 5' 8.5" (1.74 m)   Wt 107 kg (236 lb 8 oz)   SpO2 97%   BMI 35.44 kg/m²     BP Readings from Last 3 Encounters:   23 130/68   23 120/66   22 118/72     Wt Readings from Last 3 Encounters:   23 107 kg (236 lb 8 oz)   23 106 kg (233 lb)   22 108 kg (237 lb)         Physical Exam  Constitutional:       General: He is not in acute distress. HENT:      Right Ear: Tympanic membrane and ear canal normal.      Left Ear: Tympanic membrane and ear canal normal.   Eyes:      General: No scleral icterus. Extraocular Movements: Extraocular movements intact. Conjunctiva/sclera: Conjunctivae normal.      Pupils: Pupils are equal, round, and reactive to light. Neck:      Vascular: No carotid bruit. Cardiovascular:      Rate and Rhythm: Normal rate and regular rhythm.       Heart sounds: No murmur heard. No gallop. Pulmonary:      Effort: Pulmonary effort is normal.      Breath sounds: Normal breath sounds. No wheezing or rales. Abdominal:      General: Bowel sounds are normal. There is no distension. Palpations: Abdomen is soft. There is no hepatomegaly, splenomegaly or mass. Tenderness: There is no abdominal tenderness. There is no guarding or rebound. Hernia: A hernia is present. Hernia is present in the umbilical area. Comments: Small umbilical hernia. Diastasis recti. Musculoskeletal:      Right lower leg: No edema. Left lower leg: No edema. Lymphadenopathy:      Cervical: No cervical adenopathy. Skin:     Coloration: Skin is pale. Findings: No rash. Neurological:      General: No focal deficit present. Mental Status: He is alert and oriented to person, place, and time.    Psychiatric:         Mood and Affect: Mood normal.         Behavior: Behavior normal.         Cognition and Memory: Cognition normal.          Aneta Mcgee MD

## 2023-07-13 ENCOUNTER — OFFICE VISIT (OUTPATIENT)
Dept: FAMILY MEDICINE CLINIC | Facility: CLINIC | Age: 78
End: 2023-07-13
Payer: COMMERCIAL

## 2023-07-13 ENCOUNTER — APPOINTMENT (OUTPATIENT)
Dept: LAB | Facility: MEDICAL CENTER | Age: 78
End: 2023-07-13
Payer: COMMERCIAL

## 2023-07-13 VITALS
BODY MASS INDEX: 35.03 KG/M2 | DIASTOLIC BLOOD PRESSURE: 68 MMHG | HEIGHT: 69 IN | HEART RATE: 50 BPM | SYSTOLIC BLOOD PRESSURE: 130 MMHG | WEIGHT: 236.5 LBS | TEMPERATURE: 97.7 F | OXYGEN SATURATION: 97 %

## 2023-07-13 DIAGNOSIS — G47.33 OSA (OBSTRUCTIVE SLEEP APNEA): ICD-10-CM

## 2023-07-13 DIAGNOSIS — E11.29 TYPE 2 DIABETES MELLITUS WITH MICROALBUMINURIA, WITHOUT LONG-TERM CURRENT USE OF INSULIN (HCC): ICD-10-CM

## 2023-07-13 DIAGNOSIS — I10 PRIMARY HYPERTENSION: ICD-10-CM

## 2023-07-13 DIAGNOSIS — E11.42 DIABETIC POLYNEUROPATHY ASSOCIATED WITH TYPE 2 DIABETES MELLITUS (HCC): ICD-10-CM

## 2023-07-13 DIAGNOSIS — E78.2 MIXED HYPERLIPIDEMIA: ICD-10-CM

## 2023-07-13 DIAGNOSIS — J44.9 CHRONIC OBSTRUCTIVE PULMONARY DISEASE, UNSPECIFIED COPD TYPE (HCC): ICD-10-CM

## 2023-07-13 DIAGNOSIS — R80.9 TYPE 2 DIABETES MELLITUS WITH MICROALBUMINURIA, WITHOUT LONG-TERM CURRENT USE OF INSULIN (HCC): ICD-10-CM

## 2023-07-13 DIAGNOSIS — E66.01 OBESITY, MORBID (HCC): ICD-10-CM

## 2023-07-13 DIAGNOSIS — Z00.00 MEDICARE ANNUAL WELLNESS VISIT, SUBSEQUENT: ICD-10-CM

## 2023-07-13 DIAGNOSIS — I73.9 PAD (PERIPHERAL ARTERY DISEASE) (HCC): ICD-10-CM

## 2023-07-13 DIAGNOSIS — D50.9 MICROCYTIC ANEMIA: Primary | ICD-10-CM

## 2023-07-13 LAB
FERRITIN SERPL-MCNC: 9 NG/ML (ref 24–336)
FOLATE SERPL-MCNC: >22.3 NG/ML
RETICS # AUTO: ABNORMAL 10*3/UL (ref 14356–105094)
RETICS # CALC: 1.88 % (ref 0.37–1.87)
VIT B12 SERPL-MCNC: 477 PG/ML (ref 180–914)

## 2023-07-13 PROCEDURE — 84165 PROTEIN E-PHORESIS SERUM: CPT | Performed by: FAMILY MEDICINE

## 2023-07-13 PROCEDURE — 83550 IRON BINDING TEST: CPT | Performed by: FAMILY MEDICINE

## 2023-07-13 PROCEDURE — 36415 COLL VENOUS BLD VENIPUNCTURE: CPT | Performed by: FAMILY MEDICINE

## 2023-07-13 PROCEDURE — 83540 ASSAY OF IRON: CPT | Performed by: FAMILY MEDICINE

## 2023-07-13 PROCEDURE — 82746 ASSAY OF FOLIC ACID SERUM: CPT | Performed by: FAMILY MEDICINE

## 2023-07-13 PROCEDURE — G0439 PPPS, SUBSEQ VISIT: HCPCS | Performed by: FAMILY MEDICINE

## 2023-07-13 PROCEDURE — 82607 VITAMIN B-12: CPT | Performed by: FAMILY MEDICINE

## 2023-07-13 PROCEDURE — 82728 ASSAY OF FERRITIN: CPT | Performed by: FAMILY MEDICINE

## 2023-07-13 PROCEDURE — 85045 AUTOMATED RETICULOCYTE COUNT: CPT | Performed by: FAMILY MEDICINE

## 2023-07-13 PROCEDURE — 99214 OFFICE O/P EST MOD 30 MIN: CPT | Performed by: FAMILY MEDICINE

## 2023-07-13 NOTE — PATIENT INSTRUCTIONS
Medicare Preventive Visit Patient Instructions  Thank you for completing your Welcome to Medicare Visit or Medicare Annual Wellness Visit today. Your next wellness visit will be due in one year (7/13/2024). The screening/preventive services that you may require over the next 5-10 years are detailed below. Some tests may not apply to you based off risk factors and/or age. Screening tests ordered at today's visit but not completed yet may show as past due. Also, please note that scanned in results may not display below. Preventive Screenings:  Service Recommendations Previous Testing/Comments   Colorectal Cancer Screening  · Colonoscopy    · Fecal Occult Blood Test (FOBT)/Fecal Immunochemical Test (FIT)  · Fecal DNA/Cologuard Test  · Flexible Sigmoidoscopy Age: 43-73 years old   Colonoscopy: every 10 years (May be performed more frequently if at higher risk)  OR  FOBT/FIT: every 1 year  OR  Cologuard: every 3 years  OR  Sigmoidoscopy: every 5 years  Screening may be recommended earlier than age 39 if at higher risk for colorectal cancer. Also, an individualized decision between you and your healthcare provider will decide whether screening between the ages of 77-80 would be appropriate.  Colonoscopy: Not on file  FOBT/FIT: Not on file  Cologuard: Not on file  Sigmoidoscopy: Not on file          Prostate Cancer Screening Individualized decision between patient and health care provider in men between ages of 53-66   Medicare will cover every 12 months beginning on the day after your 50th birthday PSA: 1.9 ng/mL     Screening Not Indicated  Screening Not Indicated     Hepatitis C Screening Once for adults born between 1945 and 1965  More frequently in patients at high risk for Hepatitis C Hep C Antibody: Not on file    Screening Current  Screening Current   Diabetes Screening 1-2 times per year if you're at risk for diabetes or have pre-diabetes Fasting glucose: 156 mg/dL (7/6/2023)  A1C: 7.3 % (7/6/2023)  Screening Not Indicated  History Diabetes  Screening Not Indicated  History Diabetes   Cholesterol Screening Once every 5 years if you don't have a lipid disorder. May order more often based on risk factors. Lipid panel: 07/06/2023  Screening Not Indicated  History Lipid Disorder  Screening Not Indicated  History Lipid Disorder      Other Preventive Screenings Covered by Medicare:  1. Abdominal Aortic Aneurysm (AAA) Screening: covered once if your at risk. You're considered to be at risk if you have a family history of AAA or a male between the age of 70-76 who smoking at least 100 cigarettes in your lifetime. 2. Lung Cancer Screening: covers low dose CT scan once per year if you meet all of the following conditions: (1) Age 48-67; (2) No signs or symptoms of lung cancer; (3) Current smoker or have quit smoking within the last 15 years; (4) You have a tobacco smoking history of at least 20 pack years (packs per day x number of years you smoked); (5) You get a written order from a healthcare provider. 3. Glaucoma Screening: covered annually if you're considered high risk: (1) You have diabetes OR (2) Family history of glaucoma OR (3)  aged 48 and older OR (3)  American aged 72 and older  3. Osteoporosis Screening: covered every 2 years if you meet one of the following conditions: (1) Have a vertebral abnormality; (2) On glucocorticoid therapy for more than 3 months; (3) Have primary hyperparathyroidism; (4) On osteoporosis medications and need to assess response to drug therapy. 5. HIV Screening: covered annually if you're between the age of 14-79. Also covered annually if you are younger than 13 and older than 72 with risk factors for HIV infection. For pregnant patients, it is covered up to 3 times per pregnancy.     Immunizations:  Immunization Recommendations   Influenza Vaccine Annual influenza vaccination during flu season is recommended for all persons aged >= 6 months who do not have contraindications   Pneumococcal Vaccine   * Pneumococcal conjugate vaccine = PCV13 (Prevnar 13), PCV15 (Vaxneuvance), PCV20 (Prevnar 20)  * Pneumococcal polysaccharide vaccine = PPSV23 (Pneumovax) Adults 2364 years old: 1-3 doses may be recommended based on certain risk factors  Adults 72 years old: 1-2 doses may be recommended based off what pneumonia vaccine you previously received   Hepatitis B Vaccine 3 dose series if at intermediate or high risk (ex: diabetes, end stage renal disease, liver disease)   Tetanus (Td) Vaccine - COST NOT COVERED BY MEDICARE PART B Following completion of primary series, a booster dose should be given every 10 years to maintain immunity against tetanus. Td may also be given as tetanus wound prophylaxis. Tdap Vaccine - COST NOT COVERED BY MEDICARE PART B Recommended at least once for all adults. For pregnant patients, recommended with each pregnancy. Shingles Vaccine (Shingrix) - COST NOT COVERED BY MEDICARE PART B  2 shot series recommended in those aged 48 and above     Health Maintenance Due:      Topic Date Due   • Hepatitis C Screening  Addressed   • Colorectal Cancer Screening  Discontinued     Immunizations Due:      Topic Date Due   • Influenza Vaccine (1) 09/01/2023     Advance Directives   What are advance directives? Advance directives are legal documents that state your wishes and plans for medical care. These plans are made ahead of time in case you lose your ability to make decisions for yourself. Advance directives can apply to any medical decision, such as the treatments you want, and if you want to donate organs. What are the types of advance directives? There are many types of advance directives, and each state has rules about how to use them. You may choose a combination of any of the following:  · Living will: This is a written record of the treatment you want.  You can also choose which treatments you do not want, which to limit, and which to stop at a certain time. This includes surgery, medicine, IV fluid, and tube feedings. · Durable power of  for healthcare [facility]): This is a written record that states who you want to make healthcare choices for you when you are unable to make them for yourself. This person, called a proxy, is usually a family member or a friend. You may choose more than 1 proxy. · Do not resuscitate (DNR) order:  A DNR order is used in case your heart stops beating or you stop breathing. It is a request not to have certain forms of treatment, such as CPR. A DNR order may be included in other types of advance directives. · Medical directive: This covers the care that you want if you are in a coma, near death, or unable to make decisions for yourself. You can list the treatments you want for each condition. Treatment may include pain medicine, surgery, blood transfusions, dialysis, IV or tube feedings, and a ventilator (breathing machine). · Values history: This document has questions about your views, beliefs, and how you feel and think about life. This information can help others choose the care that you would choose. Why are advance directives important? An advance directive helps you control your care. Although spoken wishes may be used, it is better to have your wishes written down. Spoken wishes can be misunderstood, or not followed. Treatments may be given even if you do not want them. An advance directive may make it easier for your family to make difficult choices about your care. Weight Management   Why it is important to manage your weight:  Being overweight increases your risk of health conditions such as heart disease, high blood pressure, type 2 diabetes, and certain types of cancer. It can also increase your risk for osteoarthritis, sleep apnea, and other respiratory problems. Aim for a slow, steady weight loss. Even a small amount of weight loss can lower your risk of health problems.   How to lose weight safely:  A safe and healthy way to lose weight is to eat fewer calories and get regular exercise. You can lose up about 1 pound a week by decreasing the number of calories you eat by 500 calories each day. Healthy meal plan for weight management:  A healthy meal plan includes a variety of foods, contains fewer calories, and helps you stay healthy. A healthy meal plan includes the following:  · Eat whole-grain foods more often. A healthy meal plan should contain fiber. Fiber is the part of grains, fruits, and vegetables that is not broken down by your body. Whole-grain foods are healthy and provide extra fiber in your diet. Some examples of whole-grain foods are whole-wheat breads and pastas, oatmeal, brown rice, and bulgur. · Eat a variety of vegetables every day. Include dark, leafy greens such as spinach, kale, ervin greens, and mustard greens. Eat yellow and orange vegetables such as carrots, sweet potatoes, and winter squash. · Eat a variety of fruits every day. Choose fresh or canned fruit (canned in its own juice or light syrup) instead of juice. Fruit juice has very little or no fiber. · Eat low-fat dairy foods. Drink fat-free (skim) milk or 1% milk. Eat fat-free yogurt and low-fat cottage cheese. Try low-fat cheeses such as mozzarella and other reduced-fat cheeses. · Choose meat and other protein foods that are low in fat. Choose beans or other legumes such as split peas or lentils. Choose fish, skinless poultry (chicken or turkey), or lean cuts of red meat (beef or pork). Before you cook meat or poultry, cut off any visible fat. · Use less fat and oil. Try baking foods instead of frying them. Add less fat, such as margarine, sour cream, regular salad dressing and mayonnaise to foods. Eat fewer high-fat foods. Some examples of high-fat foods include french fries, doughnuts, ice cream, and cakes. · Eat fewer sweets. Limit foods and drinks that are high in sugar.  This includes candy, cookies, regular soda, and sweetened drinks. Exercise:  Exercise at least 30 minutes per day on most days of the week. Some examples of exercise include walking, biking, dancing, and swimming. You can also fit in more physical activity by taking the stairs instead of the elevator or parking farther away from stores. Ask your healthcare provider about the best exercise plan for you. © Copyright [employer] 2018 Information is for End User's use only and may not be sold, redistributed or otherwise used for commercial purposes. All illustrations and images included in CareNotes® are the copyrighted property of [employer]. or [address] [place] [address]  Type 2 Diabetes Management for Adults   AMBULATORY CARE:   Type 2 diabetes  is a disease that affects how your body uses glucose (sugar). Either your body cannot make enough insulin, or it cannot use the insulin correctly. It is important to keep diabetes controlled to prevent damage to your heart, blood vessels, and other organs. Management will help you feel well and enjoy your daily activities. Your diabetes care team providers can help you make a plan to fit diabetes care into your schedule. Your plan can change over time to fit your needs and your family's needs. Have someone call your local emergency number (911 in the [address]) if:   • You cannot be woken. • You have signs of diabetic ketoacidosis:     ? confusion, fatigue    ? vomiting    ? rapid heartbeat    ? fruity smelling breath    ? extreme thirst    ? dry mouth and skin    • You have any of the following signs of a heart attack:      ? Squeezing, pressure, or pain in your chest    ? You may  also have any of the following:     - Discomfort or pain in your back, neck, jaw, stomach, or arm    - Shortness of breath    - Nausea or vomiting    - Lightheadedness or a sudden cold sweat    • You have any of the following signs of a stroke:      ? Numbness or drooping on one side of your face     ?  Weakness in an arm or leg    ? Confusion or difficulty speaking    ? Dizziness, a severe headache, or vision loss    Call your doctor or diabetes care team provider if:   • You have a sore or wound that will not heal.    • You have a change in the amount you urinate. • Your blood sugar levels are higher than your target goals. • You often have lower blood sugar levels than your target goals. • Your skin is red, dry, warm, or swollen. • You have trouble coping with diabetes, or you feel anxious or depressed. • You have questions or concerns about your condition or care. What you need to know about high blood sugar levels:  High blood sugar levels may not cause any symptoms. You may feel more thirsty or urinate more often than usual. Over time, high blood sugar levels can damage your nerves, blood vessels, tissues, and organs. The following can increase your blood sugar levels:  • Large meals or large amounts of carbohydrates at one time    • Less physical activity    • Stress    • Illness    • A lower dose of diabetes medicine or insulin, or a late dose    What you need to know about low blood sugar levels:  Symptoms include feeling shaky, dizzy, irritable, or confused. You can prevent symptoms by keeping your blood sugar levels from going too low. • Treat a low blood sugar level right away:      ? Drink 4 ounces of juice or have 1 tube of glucose gel. ? Check your blood sugar level again 10 to 15 minutes later. ? When the level goes back to normal, eat a meal or snack to prevent another decrease. • Keep glucose gel, raisins, or hard candy with you at all times to treat a low blood sugar level. • Your blood sugar level can get too low if you take diabetes medicine or insulin and do not eat enough food. • If you use insulin, check your blood sugar level before you exercise.       ? If your blood sugar level is below 100 mg/dL, eat 4 crackers or 2 ounces of raisins, or drink 4 ounces of juice.    ? Check your level every 30 minutes if you exercise longer than 1 hour. ? You may need a snack during or after exercise. What you can do to manage your blood sugar levels:   • Check your blood sugar levels as directed and as needed. Several items are available to use to check your levels. You may need to check by testing a drop of blood in a glucose monitor. You may instead be given a continuous glucose monitoring (CGM) device. The device is worn at all times. The CGM checks your blood sugar level every 5 minutes. It sends results to an electronic device such as a smart phone. A CGM can be used with or without an insulin pump. You and your diabetes care team providers will decide on the best method for you. The goal for blood sugar levels before meals  is between 80 and 130 mg/dL and 2 hours after eating  is lower than 180 mg/dL. • Make healthy food choices. Work with a dietitian to develop a meal plan that works for you and your schedule. A dietitian can help you learn how to eat the right amount of carbohydrates during your meals and snacks. Carbohydrates can raise your blood sugar level if you eat too many at one time. Examples of foods that contain carbohydrates are breads, cereals, rice, pasta, and sweets. • Eat high-fiber foods as directed. Fiber helps improve blood sugar levels. Fiber also lowers your risk for heart disease and other problems diabetes can cause. Examples of high-fiber foods include vegetables, whole-grain bread, and beans such as villalpando beans. Your dietitian can tell you how much fiber to have each day. • Get regular physical activity. Physical activity can help you get to your target blood sugar level goal and manage your weight. Get at least 150 minutes of moderate to vigorous aerobic physical activity each week. Do not miss more than 2 days in a row. Do not sit longer than 30 minutes at a time.  Your healthcare provider can help you create an activity plan. The plan can include the best activities for you and can help you build your strength and endurance. • Maintain a healthy weight. Ask your team what a healthy weight is for you. A healthy weight can help you control diabetes and prevent heart disease. Ask your team to help you create a weight loss plan, if needed. Weight loss can help make a difference in managing diabetes. Your team will help you set a weight-loss goal, such as 10 to 15 pounds, or 5% of your extra weight. Together you and your team can set manageable weight loss goals. • Take your diabetes medicine or insulin as directed. You may need diabetes medicine, insulin, or both to help control your blood sugar levels. Your healthcare provider will teach you how and when to take your diabetes medicine or insulin. You will also be taught about side effects oral diabetes medicine can cause. Insulin may be injected or given through a pump or pen. You and your providers will decide on the best method for you:    ? An insulin pump  is an implanted device that gives your insulin 24 hours a day. An insulin pump prevents the need for multiple insulin injections in a day. ? An insulin pen  is a device prefilled with the right amount of insulin. ? You and your family members will be taught how to draw up and give insulin  if this is the best method for you. Your providers will also teach you how to dispose of needles and syringes. ? You will learn how much insulin you need  and when to give it. You will be taught when not to give insulin. You will also be taught what to do if your blood sugar level drops too low. This may happen if you take insulin and do not eat the right amount of carbohydrates. More ways to manage type 2 diabetes:   • Wear medical alert identification. Wear medical alert jewelry or carry a card that says you have diabetes. Ask your provider where to get these items. • Do not smoke. Nicotine and other chemicals in cigarettes and cigars can cause lung and blood vessel damage. It also makes it more difficult to manage your diabetes. Ask your provider for information if you currently smoke and need help to quit. Do not use e-cigarettes or smokeless tobacco in place of cigarettes or to help you quit. They still contain nicotine. • Check your feet each day for cuts, scratches, calluses, or other wounds. Look for redness and swelling, and feel for warmth. Wear shoes that fit well. Check your shoes for rocks or other objects that can hurt your feet. Do not walk barefoot or wear shoes without socks. Wear cotton socks to help keep your feet dry. • Ask about vaccines you may need. You have a higher risk for serious illness if you get the flu, pneumonia, COVID-19, or hepatitis. Ask your provider if you should get vaccines to prevent these or other diseases, and when to get the vaccines. • Talk to your provider if you become stressed about diabetes care. Sometimes being able to fit diabetes care into your life can cause increased stress. The stress can cause you not to take care of yourself properly. Your care team providers can help by offering tips about self-care. Your providers may suggest you talk to a mental health provider who can listen and offer help with self-care issues. • Have your A1c checked as directed. Your provider may check your A1c every 3 months, or 2 times each year if your diabetes is controlled. An A1c test shows the average amount of sugar in your blood over the past 2 to 3 months. Your provider will tell you what your A1c level should be. • Have screening tests as directed. Your provider may recommend screening for complications of diabetes and other conditions that may develop.  Some screenings may begin right away and some may happen within the first 5 years of diagnosis:    ? Examples of diabetes complications  include kidney problems, high cholesterol, high blood pressure, blood vessel problems, eye problems, and sleep apnea. ? You may be screened for a low vitamin B level  if you take oral diabetes medicine for a long time. ? Women of childbearing years may be screened  for polycystic ovarian syndrome (PCOS). Follow up with your doctor or diabetes care team providers as directed: You may need to have blood tests done before your follow-up visit. The test results will show if changes need to be made in your treatment or self-care. Talk to your provider if you cannot afford your medicine. Write down your questions so you remember to ask them during your visits. © Copyright [employer] 2022 Information is for End User's use only and may not be sold, redistributed or otherwise used for commercial purposes. The above information is an  only. It is not intended as medical advice for individual conditions or treatments. Talk to your doctor, nurse or pharmacist before following any medical regimen to see if it is safe and effective for you. Type 2 Diabetes Management for Adults   AMBULATORY CARE:   Type 2 diabetes  is a disease that affects how your body uses glucose (sugar). Either your body cannot make enough insulin, or it cannot use the insulin correctly. It is important to keep diabetes controlled to prevent damage to your heart, blood vessels, and other organs. Management will help you feel well and enjoy your daily activities. Your diabetes care team providers can help you make a plan to fit diabetes care into your schedule. Your plan can change over time to fit your needs and your family's needs. Have someone call your local emergency number (911 in the [address]) if:   • You cannot be woken. • You have signs of diabetic ketoacidosis:     ? confusion, fatigue    ? vomiting    ?  rapid heartbeat    ? fruity smelling breath    ? extreme thirst    ? dry mouth and skin    • You have any of the following signs of a heart attack: ? Squeezing, pressure, or pain in your chest    ? You may  also have any of the following:     - Discomfort or pain in your back, neck, jaw, stomach, or arm    - Shortness of breath    - Nausea or vomiting    - Lightheadedness or a sudden cold sweat    • You have any of the following signs of a stroke:      ? Numbness or drooping on one side of your face     ? Weakness in an arm or leg    ? Confusion or difficulty speaking    ? Dizziness, a severe headache, or vision loss    Call your doctor or diabetes care team provider if:   • You have a sore or wound that will not heal.    • You have a change in the amount you urinate. • Your blood sugar levels are higher than your target goals. • You often have lower blood sugar levels than your target goals. • Your skin is red, dry, warm, or swollen. • You have trouble coping with diabetes, or you feel anxious or depressed. • You have questions or concerns about your condition or care. What you need to know about high blood sugar levels:  High blood sugar levels may not cause any symptoms. You may feel more thirsty or urinate more often than usual. Over time, high blood sugar levels can damage your nerves, blood vessels, tissues, and organs. The following can increase your blood sugar levels:  • Large meals or large amounts of carbohydrates at one time    • Less physical activity    • Stress    • Illness    • A lower dose of diabetes medicine or insulin, or a late dose    What you need to know about low blood sugar levels:  Symptoms include feeling shaky, dizzy, irritable, or confused. You can prevent symptoms by keeping your blood sugar levels from going too low. • Treat a low blood sugar level right away:      ? Drink 4 ounces of juice or have 1 tube of glucose gel. ? Check your blood sugar level again 10 to 15 minutes later. ? When the level goes back to normal, eat a meal or snack to prevent another decrease.        • Keep glucose gel, raisins, or hard candy with you at all times to treat a low blood sugar level. • Your blood sugar level can get too low if you take diabetes medicine or insulin and do not eat enough food. • If you use insulin, check your blood sugar level before you exercise. ? If your blood sugar level is below 100 mg/dL, eat 4 crackers or 2 ounces of raisins, or drink 4 ounces of juice. ? Check your level every 30 minutes if you exercise longer than 1 hour. ? You may need a snack during or after exercise. What you can do to manage your blood sugar levels:   • Check your blood sugar levels as directed and as needed. Several items are available to use to check your levels. You may need to check by testing a drop of blood in a glucose monitor. You may instead be given a continuous glucose monitoring (CGM) device. The device is worn at all times. The CGM checks your blood sugar level every 5 minutes. It sends results to an electronic device such as a smart phone. A CGM can be used with or without an insulin pump. You and your diabetes care team providers will decide on the best method for you. The goal for blood sugar levels before meals  is between 80 and 130 mg/dL and 2 hours after eating  is lower than 180 mg/dL. • Make healthy food choices. Work with a dietitian to develop a meal plan that works for you and your schedule. A dietitian can help you learn how to eat the right amount of carbohydrates during your meals and snacks. Carbohydrates can raise your blood sugar level if you eat too many at one time. Examples of foods that contain carbohydrates are breads, cereals, rice, pasta, and sweets. • Eat high-fiber foods as directed. Fiber helps improve blood sugar levels. Fiber also lowers your risk for heart disease and other problems diabetes can cause. Examples of high-fiber foods include vegetables, whole-grain bread, and beans such as villalpando beans.  Your dietitian can tell you how much fiber to have each day. • Get regular physical activity. Physical activity can help you get to your target blood sugar level goal and manage your weight. Get at least 150 minutes of moderate to vigorous aerobic physical activity each week. Do not miss more than 2 days in a row. Do not sit longer than 30 minutes at a time. Your healthcare provider can help you create an activity plan. The plan can include the best activities for you and can help you build your strength and endurance. • Maintain a healthy weight. Ask your team what a healthy weight is for you. A healthy weight can help you control diabetes and prevent heart disease. Ask your team to help you create a weight loss plan, if needed. Weight loss can help make a difference in managing diabetes. Your team will help you set a weight-loss goal, such as 10 to 15 pounds, or 5% of your extra weight. Together you and your team can set manageable weight loss goals. • Take your diabetes medicine or insulin as directed. You may need diabetes medicine, insulin, or both to help control your blood sugar levels. Your healthcare provider will teach you how and when to take your diabetes medicine or insulin. You will also be taught about side effects oral diabetes medicine can cause. Insulin may be injected or given through a pump or pen. You and your providers will decide on the best method for you:    ? An insulin pump  is an implanted device that gives your insulin 24 hours a day. An insulin pump prevents the need for multiple insulin injections in a day. ? An insulin pen  is a device prefilled with the right amount of insulin. ? You and your family members will be taught how to draw up and give insulin  if this is the best method for you. Your providers will also teach you how to dispose of needles and syringes. ? You will learn how much insulin you need  and when to give it. You will be taught when not to give insulin.  You will also be taught what to do if your blood sugar level drops too low. This may happen if you take insulin and do not eat the right amount of carbohydrates. More ways to manage type 2 diabetes:   • Wear medical alert identification. Wear medical alert jewelry or carry a card that says you have diabetes. Ask your provider where to get these items. • Do not smoke. Nicotine and other chemicals in cigarettes and cigars can cause lung and blood vessel damage. It also makes it more difficult to manage your diabetes. Ask your provider for information if you currently smoke and need help to quit. Do not use e-cigarettes or smokeless tobacco in place of cigarettes or to help you quit. They still contain nicotine. • Check your feet each day for cuts, scratches, calluses, or other wounds. Look for redness and swelling, and feel for warmth. Wear shoes that fit well. Check your shoes for rocks or other objects that can hurt your feet. Do not walk barefoot or wear shoes without socks. Wear cotton socks to help keep your feet dry. • Ask about vaccines you may need. You have a higher risk for serious illness if you get the flu, pneumonia, COVID-19, or hepatitis. Ask your provider if you should get vaccines to prevent these or other diseases, and when to get the vaccines. • Talk to your provider if you become stressed about diabetes care. Sometimes being able to fit diabetes care into your life can cause increased stress. The stress can cause you not to take care of yourself properly. Your care team providers can help by offering tips about self-care. Your providers may suggest you talk to a mental health provider who can listen and offer help with self-care issues. • Have your A1c checked as directed. Your provider may check your A1c every 3 months, or 2 times each year if your diabetes is controlled. An A1c test shows the average amount of sugar in your blood over the past 2 to 3 months.  Your provider will tell you what your A1c level should be. • Have screening tests as directed. Your provider may recommend screening for complications of diabetes and other conditions that may develop. Some screenings may begin right away and some may happen within the first 5 years of diagnosis:    ? Examples of diabetes complications  include kidney problems, high cholesterol, high blood pressure, blood vessel problems, eye problems, and sleep apnea. ? You may be screened for a low vitamin B level  if you take oral diabetes medicine for a long time. ? Women of childbearing years may be screened  for polycystic ovarian syndrome (PCOS). Follow up with your doctor or diabetes care team providers as directed: You may need to have blood tests done before your follow-up visit. The test results will show if changes need to be made in your treatment or self-care. Talk to your provider if you cannot afford your medicine. Write down your questions so you remember to ask them during your visits. © Copyright [employer] 2022 Information is for End User's use only and may not be sold, redistributed or otherwise used for commercial purposes. The above information is an  only. It is not intended as medical advice for individual conditions or treatments. Talk to your doctor, nurse or pharmacist before following any medical regimen to see if it is safe and effective for you.

## 2023-07-14 LAB
IRON SATN MFR SERPL: 8 % (ref 20–50)
IRON SERPL-MCNC: 33 UG/DL (ref 65–175)
TIBC SERPL-MCNC: 421 UG/DL (ref 250–450)

## 2023-07-17 ENCOUNTER — TELEPHONE (OUTPATIENT)
Dept: FAMILY MEDICINE CLINIC | Facility: CLINIC | Age: 78
End: 2023-07-17

## 2023-07-17 LAB
ALBUMIN SERPL ELPH-MCNC: 4.28 G/DL (ref 3.2–5.1)
ALBUMIN SERPL ELPH-MCNC: 57.9 % (ref 48–70)
ALPHA1 GLOB SERPL ELPH-MCNC: 0.32 G/DL (ref 0.15–0.47)
ALPHA1 GLOB SERPL ELPH-MCNC: 4.3 % (ref 1.8–7)
ALPHA2 GLOB SERPL ELPH-MCNC: 0.93 G/DL (ref 0.42–1.04)
ALPHA2 GLOB SERPL ELPH-MCNC: 12.5 % (ref 5.9–14.9)
BETA GLOB ABNORMAL SERPL ELPH-MCNC: 0.51 G/DL (ref 0.31–0.57)
BETA1 GLOB SERPL ELPH-MCNC: 6.9 % (ref 4.7–7.7)
BETA2 GLOB SERPL ELPH-MCNC: 5.1 % (ref 3.1–7.9)
BETA2+GAMMA GLOB SERPL ELPH-MCNC: 0.38 G/DL (ref 0.2–0.58)
GAMMA GLOB ABNORMAL SERPL ELPH-MCNC: 0.98 G/DL (ref 0.4–1.66)
GAMMA GLOB SERPL ELPH-MCNC: 13.3 % (ref 6.9–22.3)
IGG/ALB SER: 1.38 {RATIO} (ref 1.1–1.8)
PROT PATTERN SERPL ELPH-IMP: NORMAL
PROT SERPL-MCNC: 7.4 G/DL (ref 6.4–8.2)

## 2023-07-17 PROCEDURE — 84165 PROTEIN E-PHORESIS SERUM: CPT | Performed by: PATHOLOGY

## 2023-07-19 NOTE — TELEPHONE ENCOUNTER
Spoke with patient, gave message. He wants to know what labs he needs, he is very confused because he just had labs done and he just received orders for labs to be done in 6 months then he received labs in the mail. What is he to have repeated in 6 wks?     He also would like labs sent to [facility], will fax

## 2023-07-20 PROCEDURE — 88305 TISSUE EXAM BY PATHOLOGIST: CPT | Performed by: PATHOLOGY

## 2023-07-21 DIAGNOSIS — E61.1 IRON DEFICIENCY: Primary | ICD-10-CM

## 2023-07-24 ENCOUNTER — LAB REQUISITION (OUTPATIENT)
Dept: LAB | Facility: HOSPITAL | Age: 78
End: 2023-07-24
Payer: COMMERCIAL

## 2023-07-24 DIAGNOSIS — D48.5 NEOPLASM OF UNCERTAIN BEHAVIOR OF SKIN: ICD-10-CM

## 2023-07-28 PROCEDURE — 88305 TISSUE EXAM BY PATHOLOGIST: CPT | Performed by: PATHOLOGY

## 2023-08-31 ENCOUNTER — APPOINTMENT (OUTPATIENT)
Dept: LAB | Facility: MEDICAL CENTER | Age: 78
End: 2023-08-31
Payer: COMMERCIAL

## 2023-08-31 LAB
ALBUMIN SERPL BCP-MCNC: 4.2 G/DL (ref 3.5–5)
ALP SERPL-CCNC: 56 U/L (ref 34–104)
ALT SERPL W P-5'-P-CCNC: 31 U/L (ref 7–52)
ANION GAP SERPL CALCULATED.3IONS-SCNC: 11 MMOL/L
AST SERPL W P-5'-P-CCNC: 26 U/L (ref 13–39)
BASOPHILS # BLD AUTO: 0.11 THOUSANDS/ÂΜL (ref 0–0.1)
BASOPHILS NFR BLD AUTO: 2 % (ref 0–1)
BILIRUB SERPL-MCNC: 0.44 MG/DL (ref 0.2–1)
BUN SERPL-MCNC: 26 MG/DL (ref 5–25)
CALCIUM SERPL-MCNC: 9.7 MG/DL (ref 8.4–10.2)
CHLORIDE SERPL-SCNC: 100 MMOL/L (ref 96–108)
CHOLEST SERPL-MCNC: 110 MG/DL
CO2 SERPL-SCNC: 29 MMOL/L (ref 21–32)
CREAT SERPL-MCNC: 1.13 MG/DL (ref 0.6–1.3)
CREAT UR-MCNC: 90.8 MG/DL
EOSINOPHIL # BLD AUTO: 0.69 THOUSAND/ÂΜL (ref 0–0.61)
EOSINOPHIL NFR BLD AUTO: 10 % (ref 0–6)
ERYTHROCYTE [DISTWIDTH] IN BLOOD BY AUTOMATED COUNT: 16.6 % (ref 11.6–15.1)
EST. AVERAGE GLUCOSE BLD GHB EST-MCNC: 166 MG/DL
FERRITIN SERPL-MCNC: 12 NG/ML (ref 24–336)
GFR SERPL CREATININE-BSD FRML MDRD: 62 ML/MIN/1.73SQ M
GLUCOSE P FAST SERPL-MCNC: 144 MG/DL (ref 65–99)
HBA1C MFR BLD: 7.4 %
HCT VFR BLD AUTO: 39.7 % (ref 36.5–49.3)
HDLC SERPL-MCNC: 32 MG/DL
HGB BLD-MCNC: 12.5 G/DL (ref 12–17)
IMM GRANULOCYTES # BLD AUTO: 0.02 THOUSAND/UL (ref 0–0.2)
IMM GRANULOCYTES NFR BLD AUTO: 0 % (ref 0–2)
IRON SATN MFR SERPL: 21 % (ref 15–50)
IRON SERPL-MCNC: 83 UG/DL (ref 50–212)
LDLC SERPL CALC-MCNC: 42 MG/DL (ref 0–100)
LYMPHOCYTES # BLD AUTO: 1.96 THOUSANDS/ÂΜL (ref 0.6–4.47)
LYMPHOCYTES NFR BLD AUTO: 27 % (ref 14–44)
MCH RBC QN AUTO: 25.3 PG (ref 26.8–34.3)
MCHC RBC AUTO-ENTMCNC: 31.5 G/DL (ref 31.4–37.4)
MCV RBC AUTO: 80 FL (ref 82–98)
MICROALBUMIN UR-MCNC: 139.2 MG/L
MICROALBUMIN/CREAT 24H UR: 153 MG/G CREATININE (ref 0–30)
MONOCYTES # BLD AUTO: 0.43 THOUSAND/ÂΜL (ref 0.17–1.22)
MONOCYTES NFR BLD AUTO: 6 % (ref 4–12)
NEUTROPHILS # BLD AUTO: 4.01 THOUSANDS/ÂΜL (ref 1.85–7.62)
NEUTS SEG NFR BLD AUTO: 55 % (ref 43–75)
NONHDLC SERPL-MCNC: 78 MG/DL
NRBC BLD AUTO-RTO: 0 /100 WBCS
PLATELET # BLD AUTO: 165 THOUSANDS/UL (ref 149–390)
PMV BLD AUTO: 12.3 FL (ref 8.9–12.7)
POTASSIUM SERPL-SCNC: 4.3 MMOL/L (ref 3.5–5.3)
PROT SERPL-MCNC: 6.9 G/DL (ref 6.4–8.4)
RBC # BLD AUTO: 4.95 MILLION/UL (ref 3.88–5.62)
SODIUM SERPL-SCNC: 140 MMOL/L (ref 135–147)
TIBC SERPL-MCNC: 403 UG/DL (ref 250–450)
TRIGL SERPL-MCNC: 180 MG/DL
UIBC SERPL-MCNC: 320 UG/DL (ref 155–355)
WBC # BLD AUTO: 7.22 THOUSAND/UL (ref 4.31–10.16)

## 2023-08-31 PROCEDURE — 82728 ASSAY OF FERRITIN: CPT | Performed by: FAMILY MEDICINE

## 2023-08-31 PROCEDURE — 83550 IRON BINDING TEST: CPT | Performed by: FAMILY MEDICINE

## 2023-08-31 PROCEDURE — 83540 ASSAY OF IRON: CPT | Performed by: FAMILY MEDICINE

## 2023-09-04 ENCOUNTER — TELEPHONE (OUTPATIENT)
Dept: FAMILY MEDICINE CLINIC | Facility: CLINIC | Age: 78
End: 2023-09-04

## 2023-09-04 NOTE — TELEPHONE ENCOUNTER
Call re labs  Iron level now normal. Hemoglobin or red cell count improved from 11.1 to 12. 5. normal 12 to 17. A1c 7.4 less than 7 normal. Cholesterol 110 < 200. TGs 180 < 150. Watch diet. Continue with oral iron. Repeat CBC and iron studies in 6 to 8 weeks. Orders placed.      Recent Results (from the past 168 hour(s))   Comprehensive metabolic panel    Collection Time: 08/31/23  8:00 AM   Result Value Ref Range    Sodium 140 135 - 147 mmol/L    Potassium 4.3 3.5 - 5.3 mmol/L    Chloride 100 96 - 108 mmol/L    CO2 29 21 - 32 mmol/L    ANION GAP 11 mmol/L    BUN 26 (H) 5 - 25 mg/dL    Creatinine 1.13 0.60 - 1.30 mg/dL    Glucose, Fasting 144 (H) 65 - 99 mg/dL    Calcium 9.7 8.4 - 10.2 mg/dL    AST 26 13 - 39 U/L    ALT 31 7 - 52 U/L    Alkaline Phosphatase 56 34 - 104 U/L    Total Protein 6.9 6.4 - 8.4 g/dL    Albumin 4.2 3.5 - 5.0 g/dL    Total Bilirubin 0.44 0.20 - 1.00 mg/dL    eGFR 62 ml/min/1.73sq m   CBC and differential    Collection Time: 08/31/23  8:00 AM   Result Value Ref Range    WBC 7.22 4.31 - 10.16 Thousand/uL    RBC 4.95 3.88 - 5.62 Million/uL    Hemoglobin 12.5 12.0 - 17.0 g/dL    Hematocrit 39.7 36.5 - 49.3 %    MCV 80 (L) 82 - 98 fL    MCH 25.3 (L) 26.8 - 34.3 pg    MCHC 31.5 31.4 - 37.4 g/dL    RDW 16.6 (H) 11.6 - 15.1 %    MPV 12.3 8.9 - 12.7 fL    Platelets 389 910 - 718 Thousands/uL    nRBC 0 /100 WBCs    Neutrophils Relative 55 43 - 75 %    Immat GRANS % 0 0 - 2 %    Lymphocytes Relative 27 14 - 44 %    Monocytes Relative 6 4 - 12 %    Eosinophils Relative 10 (H) 0 - 6 %    Basophils Relative 2 (H) 0 - 1 %    Neutrophils Absolute 4.01 1.85 - 7.62 Thousands/µL    Immature Grans Absolute 0.02 0.00 - 0.20 Thousand/uL    Lymphocytes Absolute 1.96 0.60 - 4.47 Thousands/µL    Monocytes Absolute 0.43 0.17 - 1.22 Thousand/µL    Eosinophils Absolute 0.69 (H) 0.00 - 0.61 Thousand/µL    Basophils Absolute 0.11 (H) 0.00 - 0.10 Thousands/µL   Lipid panel    Collection Time: 08/31/23  8:00 AM   Result Value Ref Range    Cholesterol 110 See Comment mg/dL    Triglycerides 180 (H) See Comment mg/dL    HDL, Direct 32 (L) >=40 mg/dL    LDL Calculated 42 0 - 100 mg/dL    Non-HDL-Chol (CHOL-HDL) 78 mg/dl   Hemoglobin A1C    Collection Time: 08/31/23  8:00 AM   Result Value Ref Range    Hemoglobin A1C 7.4 (H) Normal 4.0-5.6%; PreDiabetic 5.7-6.4%; Diabetic >=6.5%; Glycemic control for adults with diabetes <7.0% %     mg/dl   Albumin / creatinine urine ratio    Collection Time: 08/31/23  8:00 AM   Result Value Ref Range    Creatinine, Ur 90.8 Reference range not established. mg/dL    Albumin,U,Random 139.2 (H) <20.0 mg/L    Albumin Creat Ratio 153 (H) 0 - 30 mg/g creatinine   TIBC Panel (incl.  Iron, TIBC, % Iron Saturation)    Collection Time: 08/31/23  8:00 AM   Result Value Ref Range    Iron Saturation 21 15 - 50 %    TIBC 403 250 - 450 ug/dL    Iron 83 50 - 212 ug/dL    UIBC 320 155 - 355 ug/dL   Ferritin    Collection Time: 08/31/23  8:00 AM   Result Value Ref Range    Ferritin 12 (L) 24 - 336 ng/mL     Hemoglobin   Date Value Ref Range Status   08/31/2023 12.5 12.0 - 17.0 g/dL Final   07/06/2023 11.1 (L) 12.0 - 17.0 g/dL Final   12/27/2022 12.7 12.0 - 17.0 g/dL Final   08/03/2015 15.3 12.0 - 17.0 g/dL Final   08/02/2015 16.4 12.0 - 17.0 g/dL Final   02/27/2015 15.3 12.0 - 17.0 g/dL Final

## 2023-09-05 DIAGNOSIS — E61.1 IRON DEFICIENCY: Primary | ICD-10-CM

## 2023-09-05 DIAGNOSIS — I10 PRIMARY HYPERTENSION: ICD-10-CM

## 2023-10-26 DIAGNOSIS — E78.2 MIXED HYPERLIPIDEMIA: ICD-10-CM

## 2023-10-26 RX ORDER — ATORVASTATIN CALCIUM 20 MG/1
20 TABLET, FILM COATED ORAL DAILY
Qty: 90 TABLET | Refills: 1 | Status: SHIPPED | OUTPATIENT
Start: 2023-10-26

## 2023-10-26 NOTE — TELEPHONE ENCOUNTER
Reason for call:   [] Refill   [] Prior Auth  [] Other:     Office:   [] PCP/Provider -   [x] Specialty/Provider - Dr Arsenio Olson    Medication: atorvastatin     Dose/Frequency: 20 mg/ daily    Quantity: 90D w refill    Pharmacy: [facility]    Does the patient have enough for 3 days?    [x] Yes   [] No - Send as HP to POD

## 2023-11-01 ENCOUNTER — HOSPITAL ENCOUNTER (EMERGENCY)
Facility: HOSPITAL | Age: 78
Discharge: HOME | End: 2023-11-01
Attending: EMERGENCY MEDICINE
Payer: COMMERCIAL

## 2023-11-01 ENCOUNTER — TELEPHONE (OUTPATIENT)
Dept: NEUROSURGERY | Facility: CLINIC | Age: 78
End: 2023-11-01

## 2023-11-01 ENCOUNTER — APPOINTMENT (EMERGENCY)
Dept: NON INVASIVE DIAGNOSTICS | Facility: HOSPITAL | Age: 78
End: 2023-11-01
Payer: COMMERCIAL

## 2023-11-01 ENCOUNTER — APPOINTMENT (EMERGENCY)
Dept: RADIOLOGY | Facility: HOSPITAL | Age: 78
End: 2023-11-01
Payer: COMMERCIAL

## 2023-11-01 ENCOUNTER — APPOINTMENT (EMERGENCY)
Dept: CT IMAGING | Facility: HOSPITAL | Age: 78
End: 2023-11-01
Payer: COMMERCIAL

## 2023-11-01 VITALS
HEART RATE: 51 BPM | SYSTOLIC BLOOD PRESSURE: 172 MMHG | DIASTOLIC BLOOD PRESSURE: 78 MMHG | WEIGHT: 236 LBS | OXYGEN SATURATION: 94 % | HEIGHT: 69 IN | TEMPERATURE: 98 F | RESPIRATION RATE: 18 BRPM | BODY MASS INDEX: 34.96 KG/M2

## 2023-11-01 DIAGNOSIS — S22.069A CLOSED FRACTURE OF EIGHTH THORACIC VERTEBRA, UNSPECIFIED FRACTURE MORPHOLOGY, INITIAL ENCOUNTER (HCC): ICD-10-CM

## 2023-11-01 DIAGNOSIS — R55 SYNCOPE: ICD-10-CM

## 2023-11-01 DIAGNOSIS — S22.068A OTHER CLOSED FRACTURE OF EIGHTH THORACIC VERTEBRA, INITIAL ENCOUNTER (HCC): ICD-10-CM
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
## 2023-11-01 DIAGNOSIS — S22.069A T8 VERTEBRAL FRACTURE (HCC): Primary | ICD-10-CM

## 2023-11-01 LAB
2HR DELTA HS TROPONIN: 1 NG/L
ALBUMIN SERPL BCP-MCNC: 4.1 G/DL (ref 3.5–5)
ALP SERPL-CCNC: 50 U/L (ref 34–104)
ALT SERPL W P-5'-P-CCNC: 26 U/L (ref 7–52)
ANION GAP SERPL CALCULATED.3IONS-SCNC: 8 MMOL/L
AORTIC VALVE MEAN VELOCITY: 9.8 M/S
APICAL FOUR CHAMBER EJECTION FRACTION: 59 %
APTT PPP: 29 SECONDS (ref 23–37)
AST SERPL W P-5'-P-CCNC: 19 U/L (ref 13–39)
AV AREA BY CONTINUOUS VTI: 2.5 CM2
AV AREA PEAK VELOCITY: 2.4 CM2
AV LVOT MEAN GRADIENT: 3 MMHG
AV LVOT PEAK GRADIENT: 6 MMHG
AV MEAN GRADIENT: 4 MMHG
AV PEAK GRADIENT: 9 MMHG
AV VALVE AREA: 2.51 CM2
AV VELOCITY RATIO: 0.84
BASOPHILS # BLD AUTO: 0.09 THOUSANDS/ÂΜL (ref 0–0.1)
BASOPHILS NFR BLD AUTO: 1 % (ref 0–1)
BILIRUB SERPL-MCNC: 0.43 MG/DL (ref 0.2–1)
BUN SERPL-MCNC: 28 MG/DL (ref 5–25)
CALCIUM SERPL-MCNC: 9.5 MG/DL (ref 8.4–10.2)
CARDIAC TROPONIN I PNL SERPL HS: 4 NG/L
CARDIAC TROPONIN I PNL SERPL HS: 5 NG/L
CHLORIDE SERPL-SCNC: 105 MMOL/L (ref 96–108)
CO2 SERPL-SCNC: 24 MMOL/L (ref 21–32)
CREAT SERPL-MCNC: 1.07 MG/DL (ref 0.6–1.3)
DOP CALC AO PEAK VEL: 1.46 M/S
DOP CALC AO VTI: 34.95 CM
DOP CALC LVOT AREA: 2.83 CM2
DOP CALC LVOT CARDIAC INDEX: 2.11 L/MIN/M2
DOP CALC LVOT CARDIAC OUTPUT: 4.66 L/MIN
DOP CALC LVOT DIAMETER: 1.9 CM
DOP CALC LVOT PEAK VEL VTI: 30.92 CM
DOP CALC LVOT PEAK VEL: 1.22 M/S
DOP CALC LVOT STROKE INDEX: 40.7 ML/M2
DOP CALC LVOT STROKE VOLUME: 87.62
E WAVE DECELERATION TIME: 229 MS
E/A RATIO: 0.8
EOSINOPHIL # BLD AUTO: 0.74 THOUSAND/ÂΜL (ref 0–0.61)
EOSINOPHIL NFR BLD AUTO: 8 % (ref 0–6)
ERYTHROCYTE [DISTWIDTH] IN BLOOD BY AUTOMATED COUNT: 16.2 % (ref 11.6–15.1)
FRACTIONAL SHORTENING: 27 (ref 28–44)
GFR SERPL CREATININE-BSD FRML MDRD: 67 ML/MIN/1.73SQ M
GLUCOSE SERPL-MCNC: 212 MG/DL (ref 65–140)
GLUCOSE SERPL-MCNC: 215 MG/DL (ref 65–140)
HCT VFR BLD AUTO: 37.1 % (ref 36.5–49.3)
HGB BLD-MCNC: 11.9 G/DL (ref 12–17)
IMM GRANULOCYTES # BLD AUTO: 0.08 THOUSAND/UL (ref 0–0.2)
IMM GRANULOCYTES NFR BLD AUTO: 1 % (ref 0–2)
INR PPP: 1.08 (ref 0.84–1.19)
INTERVENTRICULAR SEPTUM IN DIASTOLE (PARASTERNAL SHORT AXIS VIEW): 1.6 CM
INTERVENTRICULAR SEPTUM: 1.6 CM (ref 0.6–1.1)
LAAS-AP4: 25.8 CM2
LEFT ATRIUM AREA SYSTOLE SINGLE PLANE A4C: 23.4 CM2
LEFT ATRIUM SIZE: 3.9 CM
LEFT INTERNAL DIMENSION IN SYSTOLE: 3.3 CM (ref 2.1–4)
LEFT VENTRICULAR INTERNAL DIMENSION IN DIASTOLE: 4.5 CM (ref 3.5–6)
LEFT VENTRICULAR POSTERIOR WALL IN END DIASTOLE: 1.6 CM
LEFT VENTRICULAR STROKE VOLUME: 50 ML
LIPASE SERPL-CCNC: 62 U/L (ref 11–82)
LVSV (TEICH): 50 ML
LYMPHOCYTES # BLD AUTO: 1.77 THOUSANDS/ÂΜL (ref 0.6–4.47)
LYMPHOCYTES NFR BLD AUTO: 18 % (ref 14–44)
MCH RBC QN AUTO: 25.9 PG (ref 26.8–34.3)
MCHC RBC AUTO-ENTMCNC: 32.1 G/DL (ref 31.4–37.4)
MCV RBC AUTO: 81 FL (ref 82–98)
MONOCYTES # BLD AUTO: 0.59 THOUSAND/ÂΜL (ref 0.17–1.22)
MONOCYTES NFR BLD AUTO: 6 % (ref 4–12)
MV PEAK A VEL: 1.12 M/S
MV PEAK E VEL: 90 CM/S
MV STENOSIS PRESSURE HALF TIME: 66 MS
MV VALVE AREA P 1/2 METHOD: 3.33
NEUTROPHILS # BLD AUTO: 6.56 THOUSANDS/ÂΜL (ref 1.85–7.62)
NEUTS SEG NFR BLD AUTO: 66 % (ref 43–75)
NRBC BLD AUTO-RTO: 0 /100 WBCS
PLATELET # BLD AUTO: 160 THOUSANDS/UL (ref 149–390)
PMV BLD AUTO: 11.8 FL (ref 8.9–12.7)
POTASSIUM SERPL-SCNC: 3.9 MMOL/L (ref 3.5–5.3)
PROT SERPL-MCNC: 6.8 G/DL (ref 6.4–8.4)
PROTHROMBIN TIME: 14.7 SECONDS (ref 11.6–14.5)
RBC # BLD AUTO: 4.59 MILLION/UL (ref 3.88–5.62)
RIGHT ATRIUM AREA SYSTOLE A4C: 16.5 CM2
RIGHT VENTRICLE ID DIMENSION: 3.7 CM
SL CV LV EF: 60
SL CV PED ECHO LEFT VENTRICLE DIASTOLIC VOLUME (MOD BIPLANE) 2D: 94 ML
SL CV PED ECHO LEFT VENTRICLE SYSTOLIC VOLUME (MOD BIPLANE) 2D: 44 ML
SODIUM SERPL-SCNC: 137 MMOL/L (ref 135–147)
TR MAX PG: 18 MMHG
TR PEAK VELOCITY: 2.1 M/S
TRICUSPID ANNULAR PLANE SYSTOLIC EXCURSION: 2.3 CM
TRICUSPID VALVE PEAK REGURGITATION VELOCITY: 2.11 M/S
WBC # BLD AUTO: 9.83 THOUSAND/UL (ref 4.31–10.16)

## 2023-11-01 PROCEDURE — 71275 CT ANGIOGRAPHY CHEST: CPT

## 2023-11-01 PROCEDURE — 96374 THER/PROPH/DIAG INJ IV PUSH: CPT

## 2023-11-01 PROCEDURE — G1004 CDSM NDSC: HCPCS

## 2023-11-01 PROCEDURE — 80053 COMPREHEN METABOLIC PANEL: CPT

## 2023-11-01 PROCEDURE — 85025 COMPLETE CBC W/AUTO DIFF WBC: CPT

## 2023-11-01 PROCEDURE — 85610 PROTHROMBIN TIME: CPT

## 2023-11-01 PROCEDURE — 70450 CT HEAD/BRAIN W/O DYE: CPT

## 2023-11-01 PROCEDURE — 97163 PT EVAL HIGH COMPLEX 45 MIN: CPT

## 2023-11-01 PROCEDURE — 97760 ORTHOTIC MGMT&TRAING 1ST ENC: CPT

## 2023-11-01 PROCEDURE — 97166 OT EVAL MOD COMPLEX 45 MIN: CPT

## 2023-11-01 PROCEDURE — 99284 EMERGENCY DEPT VISIT MOD MDM: CPT | Performed by: PHYSICIAN ASSISTANT

## 2023-11-01 PROCEDURE — 96361 HYDRATE IV INFUSION ADD-ON: CPT

## 2023-11-01 PROCEDURE — 99285 EMERGENCY DEPT VISIT HI MDM: CPT

## 2023-11-01 PROCEDURE — 72072 X-RAY EXAM THORAC SPINE 3VWS: CPT

## 2023-11-01 PROCEDURE — 36415 COLL VENOUS BLD VENIPUNCTURE: CPT

## 2023-11-01 PROCEDURE — 83690 ASSAY OF LIPASE: CPT | Performed by: EMERGENCY MEDICINE

## 2023-11-01 PROCEDURE — 82948 REAGENT STRIP/BLOOD GLUCOSE: CPT

## 2023-11-01 PROCEDURE — 85730 THROMBOPLASTIN TIME PARTIAL: CPT

## 2023-11-01 PROCEDURE — 93306 TTE W/DOPPLER COMPLETE: CPT | Performed by: INTERNAL MEDICINE

## 2023-11-01 PROCEDURE — 84484 ASSAY OF TROPONIN QUANT: CPT

## 2023-11-01 PROCEDURE — 99205 OFFICE O/P NEW HI 60 MIN: CPT | Performed by: SURGERY

## 2023-11-01 PROCEDURE — 93306 TTE W/DOPPLER COMPLETE: CPT

## 2023-11-01 PROCEDURE — 74174 CTA ABD&PLVS W/CONTRAST: CPT

## 2023-11-01 RX ORDER — METHOCARBAMOL 500 MG/1
500 TABLET, FILM COATED ORAL EVERY 6 HOURS SCHEDULED
Status: DISCONTINUED | OUTPATIENT
Start: 2023-11-01 | End: 2023-11-01 | Stop reason: HOSPADM

## 2023-11-01 RX ORDER — CHLORTHALIDONE 25 MG/1
37.5 TABLET ORAL DAILY
Status: DISCONTINUED | OUTPATIENT
Start: 2023-11-01 | End: 2023-11-01 | Stop reason: HOSPADM

## 2023-11-01 RX ORDER — ONDANSETRON 2 MG/ML
4 INJECTION INTRAMUSCULAR; INTRAVENOUS ONCE
Status: DISCONTINUED | OUTPATIENT
Start: 2023-11-01 | End: 2023-11-01 | Stop reason: HOSPADM

## 2023-11-01 RX ORDER — HYDROMORPHONE HCL/PF 1 MG/ML
0.5 SYRINGE (ML) INJECTION ONCE
Status: COMPLETED | OUTPATIENT
Start: 2023-11-01 | End: 2023-11-01

## 2023-11-01 RX ORDER — GABAPENTIN 100 MG/1
100 CAPSULE ORAL 3 TIMES DAILY
Qty: 60 CAPSULE | Refills: 1 | Status: SHIPPED | OUTPATIENT
Start: 2023-11-01

## 2023-11-01 RX ORDER — ACETAMINOPHEN 325 MG/1
975 TABLET ORAL EVERY 8 HOURS SCHEDULED
Refills: 0
Start: 2023-11-01

## 2023-11-01 RX ORDER — GABAPENTIN 100 MG/1
100 CAPSULE ORAL 3 TIMES DAILY
Status: DISCONTINUED | OUTPATIENT
Start: 2023-11-01 | End: 2023-11-01 | Stop reason: HOSPADM

## 2023-11-01 RX ORDER — AMLODIPINE BESYLATE 5 MG/1
10 TABLET ORAL DAILY
Status: DISCONTINUED | OUTPATIENT
Start: 2023-11-01 | End: 2023-11-01 | Stop reason: HOSPADM

## 2023-11-01 RX ORDER — METHOCARBAMOL 500 MG/1
500 TABLET, FILM COATED ORAL EVERY 6 HOURS SCHEDULED
Qty: 60 TABLET | Refills: 1 | Status: SHIPPED | OUTPATIENT
Start: 2023-11-01

## 2023-11-01 RX ORDER — LIDOCAINE 50 MG/G
2 PATCH TOPICAL DAILY
Status: DISCONTINUED | OUTPATIENT
Start: 2023-11-01 | End: 2023-11-01 | Stop reason: HOSPADM

## 2023-11-01 RX ORDER — OXYCODONE HYDROCHLORIDE 5 MG/1
5 TABLET ORAL EVERY 4 HOURS PRN
Status: DISCONTINUED | OUTPATIENT
Start: 2023-11-01 | End: 2023-11-01 | Stop reason: HOSPADM

## 2023-11-01 RX ORDER — LIDOCAINE 50 MG/G
2 PATCH TOPICAL DAILY
Qty: 30 PATCH | Refills: 1 | Status: SHIPPED | OUTPATIENT
Start: 2023-11-02

## 2023-11-01 RX ORDER — ACETAMINOPHEN 325 MG/1
975 TABLET ORAL EVERY 8 HOURS SCHEDULED
Status: DISCONTINUED | OUTPATIENT
Start: 2023-11-01 | End: 2023-11-01 | Stop reason: HOSPADM

## 2023-11-01 RX ORDER — PANTOPRAZOLE SODIUM 40 MG/1
40 TABLET, DELAYED RELEASE ORAL
Status: DISCONTINUED | OUTPATIENT
Start: 2023-11-01 | End: 2023-11-01 | Stop reason: HOSPADM

## 2023-11-01 RX ORDER — CARVEDILOL 12.5 MG/1
25 TABLET ORAL 2 TIMES DAILY
Status: DISCONTINUED | OUTPATIENT
Start: 2023-11-01 | End: 2023-11-01 | Stop reason: HOSPADM

## 2023-11-01 RX ORDER — LOSARTAN POTASSIUM 50 MG/1
100 TABLET ORAL DAILY
Status: DISCONTINUED | OUTPATIENT
Start: 2023-11-01 | End: 2023-11-01 | Stop reason: HOSPADM

## 2023-11-01 RX ORDER — ATORVASTATIN CALCIUM 40 MG/1
20 TABLET, FILM COATED ORAL DAILY
Status: DISCONTINUED | OUTPATIENT
Start: 2023-11-01 | End: 2023-11-01 | Stop reason: HOSPADM

## 2023-11-01 RX ADMIN — METHOCARBAMOL TABLETS 500 MG: 500 TABLET, COATED ORAL at 11:33

## 2023-11-01 RX ADMIN — METHOCARBAMOL TABLETS 500 MG: 500 TABLET, COATED ORAL at 08:23

## 2023-11-01 RX ADMIN — GABAPENTIN 100 MG: 100 CAPSULE ORAL at 15:57

## 2023-11-01 RX ADMIN — GABAPENTIN 100 MG: 100 CAPSULE ORAL at 08:23

## 2023-11-01 RX ADMIN — SODIUM CHLORIDE 500 ML: 0.9 INJECTION, SOLUTION INTRAVENOUS at 03:43

## 2023-11-01 RX ADMIN — HYDROMORPHONE HYDROCHLORIDE 0.5 MG: 1 INJECTION, SOLUTION INTRAMUSCULAR; INTRAVENOUS; SUBCUTANEOUS at 04:50

## 2023-11-01 RX ADMIN — AMLODIPINE BESYLATE 10 MG: 5 TABLET ORAL at 10:25

## 2023-11-01 RX ADMIN — ACETAMINOPHEN 975 MG: 325 TABLET, FILM COATED ORAL at 08:22

## 2023-11-01 RX ADMIN — CHLORTHALIDONE 37.5 MG: 25 TABLET ORAL at 10:24

## 2023-11-01 RX ADMIN — CARVEDILOL 25 MG: 12.5 TABLET, FILM COATED ORAL at 10:24

## 2023-11-01 RX ADMIN — IOHEXOL 100 ML: 350 INJECTION, SOLUTION INTRAVENOUS at 05:10

## 2023-11-01 RX ADMIN — ACETAMINOPHEN 975 MG: 325 TABLET, FILM COATED ORAL at 13:46

## 2023-11-01 RX ADMIN — ATORVASTATIN CALCIUM 20 MG: 40 TABLET, FILM COATED ORAL at 10:25

## 2023-11-01 RX ADMIN — LIDOCAINE 2 PATCH: 700 PATCH TOPICAL at 08:24

## 2023-11-01 RX ADMIN — LOSARTAN POTASSIUM 100 MG: 50 TABLET, FILM COATED ORAL at 10:26

## 2023-11-01 NOTE — TREATMENT PLAN
XR thoracic spine was reviewed personally and with attending neurosurgeon. Alignment appears overall grossly maintained without significant collapse or interval change compared to CT imaging. No obvious displacement of the anterior fracture. We will continue to trial brace at this time. Will require close outpatient follow-up to ensure fracture does not worsen or progress requiring surgery in the future. Plan for repeat x-ray of the thoracic spine in approximately 2 weeks. Staff message sent to office for follow-up appointment. Contact neurosurgery as needed. Patient cleared for discharge from neurosurgical standpoint.

## 2023-11-01 NOTE — PLAN OF CARE
Problem: PHYSICAL THERAPY ADULT  Goal: Performs mobility at highest level of function for planned discharge setting. See evaluation for individualized goals. Description: Treatment/Interventions: Functional transfer training, Elevations, Patient/family training, Equipment eval/education, Bed mobility, Gait training, Compensatory technique education          See flowsheet documentation for full assessment, interventions and recommendations. 11/1/2023 1344 by José Miguel Betancourt PT  Note: Prognosis: Good  Problem List: Pain, Orthopedic restrictions  Assessment: Janette Dang is a 70 y.o. Male who [facility] due to fall and diagnosis of closed T8 spinal fracture. Orders for PT eval and treat received, w/ TLSO ordered. Comorbidities affecting pt's functional mobility at time of evaluation include: HTN, FIOR, PAD, COPD, DM, neuroendocrine tumor. Personal factors affecting DC include: stairs to enter home and positive fall history. At baseline, pt mobilizes independently w/ no AD, and w/ 1 fall(s) in the previous 6 months. Upon evaluation, pt presents w/ the following deficits: pain affecting mobility and gait deviations. Pt currently requires mod I for bed mobility, mod I for transfers, and mod I for ambulation. Pt's clinical presentation is unstable/unpredictable due to poor blood pressure control, pain affecting mobility tolerance, recent h/o falls, ongoing medical management. From a PT/mobility standpoint given the above findings, DC recommendation is level: No post acute rehabilitation needs. During current admission, pt will benefit from continued skilled inpatient PT in the acute care setting in order to address the above deficits and to maximize function and mobility prior to DC from acute care. Rehab Resource Intensity Level, PT: No post-acute rehabilitation needs    See flowsheet documentation for full assessment.

## 2023-11-01 NOTE — ED PROVIDER NOTES
History  Chief Complaint   Patient presents with    Fall     Patient had syncopal episode on Sunday w/ + LOC and questionable HS. On ASA. Reports he has about 2 years Hx of these episodes whenever he drinks cold beverages and tilts his back. C/o intermittent abdominal muscle spasms since syncopal episode. Patient is a 70-year-old male with PMH of HTN, HLD, PAD, COPD, FIOR, Kessler's esophagus, and T2DM presenting for evaluation of 2.5 days of abdominal pain shooting through to his back. The patient notes on Sunday tipping his head back to drink water when he felt immediately faint and proceeded to pass out. He notes he was standing at the time but was able to get to his knees before losing consciousness. He denies hitting his head. He is on 1 baby aspirin daily and denies all other blood thinners. He notes since that syncopal event he has had epigastric abdominal pain that radiates to his chest as well as around both sides and straight through to his back. He notes the pain is tolerable at rest, however on exertion or change in position it is severe. He describes it as a tight squeezing/"muscle spasm" like pain. He endorses associated midsternal chest pain. He notes feeling "short of breath all the time" and denies new shortness of breath. He denies associated headache, vision changes, lightheadedness or dizziness, neck pain, N/V/D, and urinary complaints. He notes his last BM was yesterday without blood or melena. He notes a history of vertigo but denies recent episodes. His wife at bedside notes that he has syncopized 3-4 times over the last year without known cause. History provided by:  Patient and significant other   used: No        Prior to Admission Medications   Prescriptions Last Dose Informant Patient Reported? Taking?    Cholecalciferol (VITAMIN D3) 1000 UNIT/SPRAY LIQD  Self Yes No   Sig: Take by mouth   Cyanocobalamin (VITAMIN B-12) 1000 MCG/15ML LIQD  Self Yes No   Sig: Take 1 tablet by mouth daily   Fluticasone-Salmeterol (Advair Diskus) 250-50 mcg/dose inhaler   No No   Sig: Inhale 1 puff 2 (two) times a day   Folic Acid 20 MG CAPS  Self Yes No   Sig: Take 1 capsule by mouth daily   Omega-3 Fatty Acids (FISH OIL) 1,000 mg  Self Yes No   Sig: Take 1 capsule by mouth daily   amLODIPine (NORVASC) 10 mg tablet   No No   Sig: Take 1 tablet (10 mg total) by mouth daily   aspirin (ECOTRIN LOW STRENGTH) 81 mg EC tablet  Self Yes No   Sig: Take 81 mg by mouth daily   atorvastatin (LIPITOR) 20 mg tablet   No No   Sig: Take 1 tablet (20 mg total) by mouth daily   carvedilol (COREG) 25 mg tablet   No No   Sig: Take 1 tablet (25 mg total) by mouth 2 (two) times a day   chlorthalidone 25 mg tablet   No No   Sig: Take 1.5 tablets (37.5 mg total) by mouth daily   metFORMIN (GLUCOPHAGE) 850 mg tablet   No No   Sig: Take 1 tablet (850 mg total) by mouth 2 (two) times a day with meals   omeprazole (PriLOSEC) 40 MG capsule  Self Yes No   Sig: Take 1 capsule by mouth 2 (two) times a day   polyethylene glycol-propylene glycol (SYSTANE) 0.4-0.3 %  Self Yes No   Sig: Apply to eye   triamcinolone (KENALOG) 0.1 % cream   No No   Sig: Apply topically 2 (two) times a day   valsartan (DIOVAN) 320 MG tablet   No No   Sig: Take 1 tablet (320 mg total) by mouth daily      Facility-Administered Medications: None       Past Medical History:   Diagnosis Date    Accelerated essential hypertension     LAST ASSESSED 8/27/15    Benign positional vertigo 8/5/2016    Cataract, left eye 10/24/2017    Diverticulosis     Lumbar radiculopathy 12/7/2016    Polyp of colon 6/5/2017    Overview:  Added automatically from request for surgery [id number]    Prostatitis     LAST ASSESSED 3/17/15       Past Surgical History:   Procedure Laterality Date    CARDIOVASCULAR STRESS TEST         Family History   Problem Relation Age of Onset    Emphysema Father         LUNG    Stroke Father      I have reviewed and agree with the history as documented. E-Cigarette/Vaping     E-Cigarette/Vaping Substances     Social History     Tobacco Use    Smoking status: Former     Packs/day: 2.00     Years: 30.00     Total pack years: 60.00     Types: Cigarettes     Quit date:      Years since quittin.8    Smokeless tobacco: Never   Substance Use Topics    Alcohol use: No    Drug use: No       Review of Systems   Constitutional:  Negative for chills and fever. Eyes:  Negative for photophobia, pain and visual disturbance. Respiratory:  Positive for shortness of breath (Chronic per patient). Cardiovascular:  Positive for chest pain (Midsternal). Negative for palpitations and leg swelling. Gastrointestinal:  Positive for abdominal distention (Endorses more firmness and bloating to abdomen since fall) and abdominal pain (Epigastric). Negative for blood in stool, constipation, diarrhea, nausea and vomiting. Genitourinary: Negative. Musculoskeletal:  Positive for back pain (Midthoracic). Negative for arthralgias, gait problem, joint swelling, neck pain and neck stiffness. Skin:  Negative for color change, pallor, rash and wound. Neurological:  Positive for syncope. Negative for dizziness, weakness, light-headedness, numbness and headaches. All other systems reviewed and are negative. Physical Exam  Physical Exam  Vitals and nursing note reviewed. Constitutional:       General: He is in acute distress (Evidencing moderate discomfort and mild stress). Appearance: Normal appearance. He is well-developed. He is morbidly obese. He is not ill-appearing, toxic-appearing or diaphoretic. HENT:      Head: Normocephalic and atraumatic. No contusion or laceration. Jaw: There is normal jaw occlusion. No trismus, tenderness, swelling, pain on movement or malocclusion. Right Ear: Tympanic membrane, ear canal and external ear normal. No hemotympanum.       Left Ear: Tympanic membrane, ear canal and external ear normal. No hemotympanum. Nose: Nose normal. No nasal deformity or signs of injury. Mouth/Throat:      Lips: Pink. Mouth: Mucous membranes are dry. Pharynx: Oropharynx is clear. Uvula midline. Eyes:      General: Lids are normal. Vision grossly intact. Gaze aligned appropriately. No visual field deficit. Extraocular Movements: Extraocular movements intact. Right eye: No nystagmus. Left eye: No nystagmus. Conjunctiva/sclera: Conjunctivae normal.      Pupils: Pupils are equal, round, and reactive to light. Cardiovascular:      Rate and Rhythm: Normal rate and regular rhythm. Pulses: Normal pulses. Radial pulses are 2+ on the right side and 2+ on the left side. Dorsalis pedis pulses are 2+ on the right side and 2+ on the left side. Heart sounds: Normal heart sounds, S1 normal and S2 normal.   Pulmonary:      Effort: Pulmonary effort is normal. No tachypnea or respiratory distress. Breath sounds: Normal air entry. No stridor, decreased air movement or transmitted upper airway sounds. Examination of the right-lower field reveals decreased breath sounds. Examination of the left-lower field reveals decreased breath sounds. Decreased breath sounds present. Chest:      Chest wall: No swelling, tenderness, crepitus or edema. Abdominal:      General: Bowel sounds are decreased. There is distension (Mildly distended). There is no abdominal bruit. Palpations: Abdomen is soft. There is no pulsatile mass. Tenderness: There is no abdominal tenderness. There is no right CVA tenderness, left CVA tenderness, guarding or rebound. Hernia: A hernia is present. Hernia is present in the ventral area (Soft, reducible). There is no hernia in the umbilical area. Comments: No reproducible tenderness on exam   Musculoskeletal:         General: Normal range of motion.       Right shoulder: Normal.      Left shoulder: Normal.      Right elbow: Normal.      Left elbow: Normal.      Right wrist: Normal.      Left wrist: Normal.      Right hand: Normal.      Left hand: Normal.      Cervical back: Full passive range of motion without pain, normal range of motion and neck supple. No bony tenderness. No spinous process tenderness. Thoracic back: No bony tenderness. Lumbar back: No bony tenderness. Right hip: Normal.      Left hip: Normal.      Right knee: Normal.      Left knee: Normal.      Right lower leg: No edema. Left lower leg: No edema. Right ankle: Normal.      Left ankle: Normal.      Right foot: Normal.      Left foot: Normal.      Comments: No reproducible tenderness on exam.  No ecchymosis, swelling, abrasion, or evidence of trauma on exam.   Skin:     General: Skin is warm and dry. Capillary Refill: Capillary refill takes 2 to 3 seconds. Findings: No rash or wound. Neurological:      General: No focal deficit present. Mental Status: He is alert and oriented to person, place, and time. Mental status is at baseline. GCS: GCS eye subscore is 4. GCS verbal subscore is 5. GCS motor subscore is 6. Cranial Nerves: Cranial nerves 2-12 are intact. Sensory: Sensation is intact. Motor: Motor function is intact.          Vital Signs  ED Triage Vitals   Temperature Pulse Respirations Blood Pressure SpO2   11/01/23 0613 11/01/23 0230 11/01/23 0230 11/01/23 0230 11/01/23 0230   98 °F (36.7 °C) 79 18 101/56 95 %      Temp Source Heart Rate Source Patient Position - Orthostatic VS BP Location FiO2 (%)   11/01/23 0613 11/01/23 0230 11/01/23 0323 11/01/23 0323 --   Oral Monitor Sitting Left arm       Pain Score       11/01/23 0822       9           Vitals:    11/01/23 1037 11/01/23 1134 11/01/23 1500 11/01/23 1557   BP: (!) 179/79 (!) 175/76 (!) 175/76 (!) 172/78   Pulse: 61 58 (!) 52 (!) 51   Patient Position - Orthostatic VS: Standing for 3 minutes - Orthostatic VS Sitting  Sitting         Visual Acuity      ED Medications  Medications   sodium chloride 0.9 % bolus 500 mL (0 mL Intravenous Stopped 11/1/23 0452)   HYDROmorphone (DILAUDID) injection 0.5 mg (0.5 mg Intravenous Given 11/1/23 0450)   iohexol (OMNIPAQUE) 350 MG/ML injection (MULTI-DOSE) 100 mL (100 mL Intravenous Given 11/1/23 0510)       Diagnostic Studies  Results Reviewed       Procedure Component Value Units Date/Time    Lipase [[id number]]  (Normal) Collected: 11/01/23 0344    Lab Status: Final result Specimen: Blood from Arm, Right Updated: 11/01/23 1013     Lipase 62 u/L     HS Troponin I 2hr [[id number]]  (Normal) Collected: 11/01/23 0535    Lab Status: Final result Specimen: Blood from Arm, Right Updated: 11/01/23 0618     hs TnI 2hr 5 ng/L      Delta 2hr hsTnI 1 ng/L     Protime-INR [[id number]]  (Abnormal) Collected: 11/01/23 0344    Lab Status: Final result Specimen: Blood from Arm, Right Updated: 11/01/23 0419     Protime 14.7 seconds      INR 1.08    APTT [[id number]]  (Normal) Collected: 11/01/23 0344    Lab Status: Final result Specimen: Blood from Arm, Right Updated: 11/01/23 0419     PTT 29 seconds     HS Troponin 0hr (reflex protocol) [[id number]]  (Normal) Collected: 11/01/23 0344    Lab Status: Final result Specimen: Blood from Arm, Right Updated: 11/01/23 0417     hs TnI 0hr 4 ng/L     Comprehensive metabolic panel [[id number]]  (Abnormal) Collected: 11/01/23 0344    Lab Status: Final result Specimen: Blood from Arm, Right Updated: 11/01/23 0409     Sodium 137 mmol/L      Potassium 3.9 mmol/L      Chloride 105 mmol/L      CO2 24 mmol/L      ANION GAP 8 mmol/L      BUN 28 mg/dL      Creatinine 1.07 mg/dL      Glucose 215 mg/dL      Calcium 9.5 mg/dL      AST 19 U/L      ALT 26 U/L      Alkaline Phosphatase 50 U/L      Total Protein 6.8 g/dL      Albumin 4.1 g/dL      Total Bilirubin 0.43 mg/dL      eGFR 67 ml/min/1.73sq m     Narrative:      [facility] guidelines for Chronic Kidney Disease (CKD):     Stage 1 with normal or high GFR (GFR > 90 mL/min/1.73 square meters)    Stage 2 Mild CKD (GFR = 60-89 mL/min/1.73 square meters)    Stage 3A Moderate CKD (GFR = 45-59 mL/min/1.73 square meters)    Stage 3B Moderate CKD (GFR = 30-44 mL/min/1.73 square meters)    Stage 4 Severe CKD (GFR = 15-29 mL/min/1.73 square meters)    Stage 5 End Stage CKD (GFR <15 mL/min/1.73 square meters)  Note: GFR calculation is accurate only with a steady state creatinine    Fingerstick Glucose (POCT) [[id number]]  (Abnormal) Collected: 11/01/23 0351    Lab Status: Final result Updated: 11/01/23 0352     POC Glucose 212 mg/dl     CBC and differential [[id number]]  (Abnormal) Collected: 11/01/23 0344    Lab Status: Final result Specimen: Blood from Arm, Right Updated: 11/01/23 0351     WBC 9.83 Thousand/uL      RBC 4.59 Million/uL      Hemoglobin 11.9 g/dL      Hematocrit 37.1 %      MCV 81 fL      MCH 25.9 pg      MCHC 32.1 g/dL      RDW 16.2 %      MPV 11.8 fL      Platelets 526 Thousands/uL      nRBC 0 /100 WBCs      Neutrophils Relative 66 %      Immat GRANS % 1 %      Lymphocytes Relative 18 %      Monocytes Relative 6 %      Eosinophils Relative 8 %      Basophils Relative 1 %      Neutrophils Absolute 6.56 Thousands/µL      Immature Grans Absolute 0.08 Thousand/uL      Lymphocytes Absolute 1.77 Thousands/µL      Monocytes Absolute 0.59 Thousand/µL      Eosinophils Absolute 0.74 Thousand/µL      Basophils Absolute 0.09 Thousands/µL                    XR spine thoracic 3 vw   Final Result by Julio César Romero MD (11/01 1647)      Grossly stable appearance of the T7 and T8 vertebral bodies with fracture seen to better advantage on CT. No interval vertebral body loss of height. Workstation performed: [id number]         XR spine thoracic 3 vw   Final Result by Oliver Banda MD (11/01 0128)      Known T7 and T8 vertebral body fractures are better evaluated on same day CT of the chest abdomen and pelvis.       Workstation performed: [id number]         CT head without contrast   Final Result by Jermain Vera MD (11/01 5584)      No acute intracranial process. No skull fracture. Chronic microangiopathy. Workstation performed: [id number]         CTA dissection protocol chest abdomen pelvis w wo contrast   Final Result by Jermain Vera MD (47/66 6598)      CTA chest:      Acute fracture of anterior paravertebral ossification at the level of T7-8 with contiguous fracture extension into the T8 vertebral body. No significant displacement, vertebral body height loss or retropulsion. Trace bilateral pleural effusions with minimal adjacent subsegmental atelectasis. No aortic dissection or aneurysm. No aortic or major branch vessel occlusion or significant stenosis. Incidental thyroid nodule(s) for which nonemergent thyroid ultrasound is recommended. Additional chronic findings and negatives as above. CTA abdomen and pelvis:      No aortic dissection. Stable minimal distal infrarenal aortic ectasia. Stable chronic multifocal moderate to severe left common iliac artery stenosis. No aortic or major branch vessel occlusion. Additional chronic findings and negatives as above. The study was marked in [facility] for immediate notification. Workstation performed: [id number]         XR spine thoracic 2 vw    (Results Pending)              Procedures  ECG 12 Lead Documentation Only    Date/Time: 11/1/2023 3:55 AM    Performed by: Donna Ornelas, [address] [place] [address]  Authorized by:  KEATON Miller    Indications / Diagnosis:  Syncope, chest pain  ECG reviewed by me, the ED Provider: yes    Patient location:  ED  Previous ECG:     Previous ECG:  Compared to current    Comparison ECG info:  August 2, 2015    Similarity:  Changes noted (Sinus rhythm with first-degree AV block, new widened QRS, new nonspecific T and ST abnormalities, new frequent PVCs)    Comparison to cardiac monitor: Yes    Interpretation: Interpretation: abnormal    Rate:     ECG rate:  62    ECG rate assessment: normal    Rhythm:     Rhythm: sinus rhythm and A-V block    Ectopy:     Ectopy: PVCs      PVCs:  Frequent  QRS:     QRS axis:  Left    QRS intervals: Wide  Conduction:     Conduction: abnormal      Abnormal conduction: 1st degree and non-specific intraventricular conduction delay    ST segments:     ST segments:  Non-specific    Depression:  I and V2  T waves:     T waves: inverted      Inverted:  I, aVR, V1 and V2  Comments:      Sinus rhythm with first-degree AV block, leftward axis, widened QRS, nonspecific T and ST abnormalities  ECG 12 Lead Documentation Only    Date/Time: 11/1/2023 5:54 AM    Performed by: Lucas Hadley, [address] [place] [address]  Authorized by: KEATON Dugan    Indications / Diagnosis:  Repeat EKG  ECG reviewed by me, the ED Provider: yes    Patient location:  ED  Previous ECG:     Previous ECG:  Compared to current    Comparison ECG info:  From 0315 on 11/1/23    Similarity:  Changes noted (New irregularity of conduction, no A-fib, could be sinus rhythm with marked sinus arrhythmia)    Comparison to cardiac monitor: Yes    Interpretation:     Interpretation: abnormal    Rate:     ECG rate:  69    ECG rate assessment: normal    Rhythm:     Rhythm: sinus rhythm and A-V block    QRS:     QRS axis:  Left    QRS intervals: Wide  Conduction:     Conduction: abnormal      Abnormal conduction: 1st degree and non-specific intraventricular conduction delay             ED Course  ED Course as of 11/02/23 0751   Wed Nov 01, 2023   0405 CBC and differential(!)  No leukocytosis   0405 Hemoglobin(!): 11.9  Decreased from last measure at 12.52 months ago   0416 Comprehensive metabolic panel(!)  No electrolyte derangement, mildly elevated BUN, elevated random glucose, no transaminitis   0419 hs TnI 0hr: 4  ACS less likely given pain x3 days   0545 Patient updated on CMP, CBC, troponin, EKG, and coags results.   He notes no pain at rest, however has severe pain with movement. He did receive Dilaudid prior to CT imaging. He currently is resting comfortably. He is aware that we are waiting for outstanding CT imaging. 8628 CT head without contrast  IMPRESSION:     No acute intracranial process. No skull fracture. Chronic microangiopathy. 0700 CTA dissection protocol chest abdomen pelvis w wo contrast  IMPRESSION:     CTA chest:     Acute fracture of anterior paravertebral ossification at the level of T7-8 with contiguous fracture extension into the T8 vertebral body. No significant displacement, vertebral body height loss or retropulsion. Trace bilateral pleural effusions with minimal adjacent subsegmental atelectasis. No aortic dissection or aneurysm. No aortic or major branch vessel occlusion or significant stenosis. Incidental thyroid nodule(s) for which nonemergent thyroid ultrasound is recommended. Additional chronic findings and negatives as above. CTA abdomen and pelvis:     No aortic dissection. Stable minimal distal infrarenal aortic ectasia. Stable chronic multifocal moderate to severe left common iliac artery stenosis. No aortic or major branch vessel occlusion. Additional chronic findings and negatives as above.        1327 Patient updated on CT results and likely cause of his pain ST 7 and T8 fracture. He is aware that trauma COLIN Danie Lindo will evaluate. He notes pain is currently tolerable unless moving. Will await their formal recommendations.    3781 COLIN Danie Lindo at bedside   2071 Patient signed out to oncoming attending Dr. Whitney Weathers   7921 Work-up pending trauma and neurosurgery evaluation and likely need for admission to [facility] for medical work-up of syncope                                             Medical Decision Making  DDx including but not limited to: Vasovagal syncope, cardiac arrhythmia, MI, ACS, metabolic abnormality, anemia, dehydration, hernia, AAA, GERD, gastritis, esophagitis, cholecystitis, pancreatitis, strain, sprain, fracture    See ED course for further MDM and disposition discussion. Problems Addressed:  Fall, initial encounter: acute illness or injury  Syncope: acute illness or injury  T8 vertebral fracture ([address]): acute illness or injury    Amount and/or Complexity of Data Reviewed  Independent Historian: spouse  Labs: ordered. Decision-making details documented in ED Course. Radiology: ordered. Decision-making details documented in ED Course. ECG/medicine tests: ordered and independent interpretation performed. Risk  OTC drugs. Prescription drug management. Disposition  Final diagnoses:   Syncope   Fall, initial encounter   T8 vertebral fracture ([address])     Time reflects when diagnosis was documented in both MDM as applicable and the Disposition within this note       Time User Action Codes Description Comment    11/1/2023  7:14 AM Leticia Elmore Add [S22.068A] Other closed fracture of eighth thoracic vertebra, initial encounter ([address])     11/1/2023  3:47 PM Leticia Elmore Add [S22.069A] Closed fracture of eighth thoracic vertebra, unspecified fracture morphology, initial encounter ([address])     11/2/2023  7:49 AM Bettie Swartz Add [Z00.400R] Closed T7 fracture ([address])     11/2/2023  7:49 AM Deandre Davies Add [R55] Syncope     11/2/2023  7:49 AM Vivmakayla Davies Add [facility]] Fall, initial encounter     11/2/2023  7:49 AM Bettie Swartz Remove [N60.504H] Closed T7 fracture ([address])     11/2/2023  7:49 AM Bettie Swartz Add [S81.242K] T8 vertebral fracture ([address])     11/2/2023  7:49 AM GIROPTIC, [address] [W89.815S] Other closed fracture of eighth thoracic vertebra, initial encounter ([address])     11/2/2023  7:49 AM Deandre Davies Modify [S22.069A] T8 vertebral fracture [facility])           ED Disposition       ED Disposition   Discharge    Condition   --    Date/Time   Wed Nov 1, 2023  4:07 PM    Comment   Elli Agent. discharge to home/self care.                   Follow-up Information       Follow up With Specialties Details Why Contact Info Additional Information    Jacqueline Rivera MD Family Medicine Follow up Follow up with PCP regarding recent hospitalization and injuries [address] [facility] [address] [address]  [address], [address]   [address] [place] [address] [address] [address] Office  Call No follow up required, please call with any questions or concerns.  [address] [address] [address] [place] [id number] [phone] [address] [facility] Neurosurgery Follow up Follow up as scheduled, obtain upright X-rays prior to appointment [address] [facility] [address] [phone] [facility] [address], [address], [place], [address]   [phone]            Discharge Medication List as of 11/1/2023  3:53 PM        START taking these medications    Details   acetaminophen (TYLENOL) 325 mg tablet Take 3 tablets (975 mg total) by mouth every 8 (eight) hours, Starting Wed 11/1/2023, No Print      gabapentin (NEURONTIN) 100 mg capsule Take 1 capsule (100 mg total) by mouth 3 (three) times a day, Starting Wed 11/1/2023, Normal      lidocaine (LIDODERM) 5 % Apply 2 patches topically over 12 hours daily Remove & Discard patch within 12 hours or as directed by MD Do not start before November 2, 2023., Starting u 11/2/2023, Normal      methocarbamol (ROBAXIN) 500 mg tablet Take 1 tablet (500 mg total) by mouth every 6 (six) hours, Starting Wed 11/1/2023, Normal           CONTINUE these medications which have NOT CHANGED    Details   amLODIPine (NORVASC) 10 mg tablet Take 1 tablet (10 mg total) by mouth daily, Starting Wed 2/8/2023, Normal      aspirin (ECOTRIN LOW STRENGTH) 81 mg EC tablet Take 81 mg by mouth daily, Historical Med atorvastatin (LIPITOR) 20 mg tablet Take 1 tablet (20 mg total) by mouth daily, Starting Thu 10/26/2023, Normal      carvedilol (COREG) 25 mg tablet Take 1 tablet (25 mg total) by mouth 2 (two) times a day, Starting Thu 3/16/2023, Normal      chlorthalidone 25 mg tablet Take 1.5 tablets (37.5 mg total) by mouth daily, Starting Tue 4/25/2023, Normal      Cholecalciferol (VITAMIN D3) 1000 UNIT/SPRAY LIQD Take by mouth, Historical Med      Cyanocobalamin (VITAMIN B-12) 1000 MCG/15ML LIQD Take 1 tablet by mouth daily, Historical Med      Fluticasone-Salmeterol (Advair Diskus) 250-50 mcg/dose inhaler Inhale 1 puff 2 (two) times a day, Starting Wed 9/4/1386, Normal      Folic Acid 20 MG CAPS Take 1 capsule by mouth daily, Historical Med      metFORMIN (GLUCOPHAGE) 850 mg tablet Take 1 tablet (850 mg total) by mouth 2 (two) times a day with meals, Starting Thu 3/16/2023, Normal      Omega-3 Fatty Acids (FISH OIL) 1,000 mg Take 1 capsule by mouth daily, Historical Med      omeprazole (PriLOSEC) 40 MG capsule Take 1 capsule by mouth 2 (two) times a day, Historical Med      polyethylene glycol-propylene glycol (SYSTANE) 0.4-0.3 % Apply to eye, Historical Med      triamcinolone (KENALOG) 0.1 % cream Apply topically 2 (two) times a day, Starting Tue 12/27/2022, Normal      valsartan (DIOVAN) 320 MG tablet Take 1 tablet (320 mg total) by mouth daily, Starting Tue 3/28/2023, Normal             Outpatient Discharge Orders   XR spine thoracic 2 vw   Standing Status: Future Standing Exp. Date: 11/01/27     Ambulatory referral to Physical Therapy   Standing Status: Future Standing Exp.  Date: 11/01/24      Shower Daily     No driving until     Call provider for:  persistent nausea or vomiting     Call provider for:  severe uncontrolled pain     Call provider for:  redness, tenderness, or signs of infection (pain, swelling, redness, odor or green/yellow discharge around incision site)     Call provider for: active or persistent bleeding     Call provider for:  difficulty breathing, headache or visual disturbances     Call provider for:  persistent dizziness or light-headedness     Call provider for:  extreme fatigue     Activity as tolerated       PDMP Review       None            ED Provider  Electronically Signed by             Malia Rowley, [address] [place] [address]  11/02/23 3623

## 2023-11-01 NOTE — DISCHARGE INSTR - AVS FIRST PAGE
Neurosurgery discharge instructions following spine fracture:     TLSO brace to be worn when out of bed of head of bed greater than 45 degrees. May place brace on while sitting on edge of bed. May be removed for showering. No bending, twisting or heavy lifting. No strenuous activities. No Driving. Follow-up as scheduled in two weeks with repeat spine x-rays to be completed 2-3 days prior to visit. Prescription has been entered electronically. **Please notify MD immediately if you have increased back or leg pain.  New numbness, tingling, weakness in your legs and/or bowel/bladder incontinence**

## 2023-11-01 NOTE — H&P
[facility]  H&P  Name: Sukhi Daly 70 y.o. male I MRN: [id number]  Unit/Bed#: ED-06 I Date of Admission: 11/1/2023   Date of Service: 11/1/2023 I Hospital Day: 0      Assessment/Plan   Syncope and collapse  Assessment & Plan  - Sunday patient tipped his head back to take a drink and felt the drink go down, causing severe epigastric pain and then he syncopized  - PMH GERD, large hiatal hernia, takes omeprazole 40 BID  - Pt continues to have epigastric pain  - EKG with heart block which is new compared to EKG in 2015  - Troponins negative  - Likely vasovagal syncope as patient reports this same event has happened 3 times in the past year  - ECHO pending  - Check Orthostatic BP        * Closed T8 spinal fracture (HCC)  Assessment & Plan  - T7/8 anterior osteophyte fracture extending into T8 vertebral body s/p syncope and collapse Talat 10/29  - Came in for uncontrolled epigastric pain and muscle spasms  - Neuro intact, no groin paraesthesias or incontinence  - Neurosurgery consult appreciated  - Non operative management  - TLSO brace, uprights  - Pain control  - PT and OT pending              Trauma Alert: Evaluation; trauma team notified at 0700 via phone   Model of Arrival: Self    Trauma Team: Attending Krystyna Zapata and [email or web address] [facility]  Consultants:     Neurosurgery: routine consult; Epic consult order placed; History of Present Illness     Chief Complaint: Epigastric pain  Mechanism:Fall     HPI:    Sukhi Daly is a 70 y.o. male who presents with epigastric pain after a syncopal event 3 days prior to presentation. Patient reports that on Sunday evening (10/29), patient took a jug out of the fridge and tipped his head back to drink it when he felt a sharp pain in his chest and then felt lightheaded and syncopized. He reports he fell and doesn't remember if he fell forward or backwards. He reports he likely hit his head and he takes ASA daily.  He reports that he woke up on his own and has been able to ambulate since then but has had continued epigastric pain. He describes the pain as muscle pulling that radiates to his back. He reports no pain at rest, and pain worsens with movement. He denies numbness, tingling, difficulty breathing or difficulty ambulation, groin paresthesias or incontinence. Review of Systems   Constitutional:  Negative for fever. HENT:  Negative for facial swelling. Eyes:  Negative for photophobia. Respiratory:  Negative for shortness of breath. Cardiovascular:  Negative for chest pain. Gastrointestinal:  Positive for abdominal pain. Negative for diarrhea, nausea and vomiting. Musculoskeletal:  Positive for myalgias. Negative for arthralgias, back pain, neck pain and neck stiffness. Skin:  Negative for wound. Neurological:  Negative for dizziness, syncope, weakness, light-headedness, numbness and headaches. Psychiatric/Behavioral:  Negative for confusion. All other systems reviewed and are negative. 12-point, complete review of systems was reviewed and negative except as stated above.      Historical Information     Past Medical History:   Diagnosis Date    Accelerated essential hypertension     LAST ASSESSED 8/27/15    Benign positional vertigo 2016    Cataract, left eye 10/24/2017    Diverticulosis     Lumbar radiculopathy 2016    Polyp of colon 2017    Overview:  Added automatically from request for surgery [id number]    Prostatitis     LAST ASSESSED 3/17/15     Past Surgical History:   Procedure Laterality Date    CARDIOVASCULAR STRESS TEST          Social History     Tobacco Use    Smoking status: Former     Packs/day: 2.00     Years: 30.00     Total pack years: 60.00     Types: Cigarettes     Quit date: 2000     Years since quittin.8    Smokeless tobacco: Never   Substance Use Topics    Alcohol use: No    Drug use: No     Immunization History   Administered Date(s) Administered    COVID-19 PFIZER VACCINE 0.3 ML IM 02/19/2021, 03/11/2021, 10/19/2021    COVID-19 Pfizer Vac BIVALENT Roger-sucrose 12 Yr+ IM 10/08/2022    H1N1, All Formulations 02/09/2010    INFLUENZA 11/05/2019    Influenza Split High Dose Preservative Free IM 09/23/2013, 10/01/2014, 11/15/2016, 10/24/2017, 11/05/2019    Influenza, high dose seasonal 0.7 mL 11/19/2018, 11/05/2019, 08/24/2020, 09/22/2021    Influenza, seasonal, injectable 09/06/2011, 09/11/2012    Pneumococcal Conjugate 13-Valent 10/12/2015    Pneumococcal Polysaccharide PPV23 09/06/2011, 11/15/2016     Family History: Non-contributory    1. Before the illness or injury that brought you to the Emergency, did you need someone to help you on a regular basis? 0=No   2. Since the illness or injury that brought you to the Emergency, have you needed more help than usual to take care of yourself? 1=Yes   3. Have you been hospitalized for one or more nights during the past 6 months (excluding a stay in the Emergency Department)? 0=No   4. In general, do you see well? 1=No   5. In general, do you have serious problems with your memory? 0=No   6. Do you take more than three different medications everyday?  0=No   TOTAL   1     Did you order a geriatric consult if the score was 2 or greater?: no     Meds/Allergies   all current active meds have been reviewed   No Known Allergies    Objective   Initial Vitals:   Temperature: 98 °F (36.7 °C) (11/01/23 9563)  Pulse: 79 (11/01/23 0230)  Respirations: 18 (11/01/23 0230)  Blood Pressure: 101/56 (11/01/23 0230)    Primary Survey:   Airway:        Status: patent;        Pre-hospital Interventions: none        Hospital Interventions: none  Breathing:        Pre-hospital Interventions: none       Effort: normal       Right breath sounds: normal       Left breath sounds: normal  Circulation:        Rhythm: regular       Rate: regular   Right Pulses Left Pulses    R radial: 2+    R pedal: 2+     L radial: 2+    L pedal: 2+       Disability:        GCS: Eye: 4; Verbal: 5 Motor: 6 Total: 15       Right Pupil: 4 mm;  round;  reactive         Left Pupil:  4 mm;  round;  reactive      R Motor Strength L Motor Strength    R : 5/5  R dorsiflex: 5/5  R plantarflex: 5/5 L : 5/5  L dorsiflex: 5/5  L plantarflex: 5/5        Sensory:  No sensory deficit  Exposure:       Completed: Yes      Secondary Survey:  Physical Exam  Vitals reviewed. Constitutional:       General: He is not in acute distress. Appearance: Normal appearance. HENT:      Head: Normocephalic and atraumatic. Right Ear: External ear normal.      Left Ear: External ear normal.      Nose: Nose normal.      Mouth/Throat:      Mouth: Mucous membranes are moist.      Pharynx: Oropharynx is clear. Eyes:      Extraocular Movements: Extraocular movements intact. Conjunctiva/sclera: Conjunctivae normal.      Pupils: Pupils are equal, round, and reactive to light. Cardiovascular:      Rate and Rhythm: Normal rate and regular rhythm. Pulses: Normal pulses. Heart sounds: Normal heart sounds. Pulmonary:      Effort: Pulmonary effort is normal. No respiratory distress. Breath sounds: Normal breath sounds. Chest:      Chest wall: No tenderness. Abdominal:      General: Abdomen is flat. Bowel sounds are normal. There is no distension. Palpations: Abdomen is soft. There is no mass. Tenderness: There is no abdominal tenderness. There is no guarding or rebound. Hernia: No hernia is present. Comments: Nontender epigastric region   Musculoskeletal:         General: No swelling, tenderness, deformity or signs of injury. Normal range of motion. Cervical back: Normal range of motion and neck supple. No rigidity or tenderness. Comments: Nontender thoracic or lumbar region   Skin:     General: Skin is warm and dry. Capillary Refill: Capillary refill takes less than 2 seconds. Findings: No bruising or lesion. Neurological:      General: No focal deficit present. Mental Status: He is alert and oriented to person, place, and time. Mental status is at baseline. Sensory: No sensory deficit. Motor: No weakness. Psychiatric:         Mood and Affect: Mood normal.         Behavior: Behavior normal.         Invasive Devices       Peripheral Intravenous Line  Duration             Peripheral IV 11/01/23 Dorsal (posterior); Right Forearm <1 day                  Lab Results: I have personally reviewed all pertinent laboratory/test results from 11/01/23, including the preceding 24 hours. Recent Labs     11/01/23  0344 11/01/23  0535   WBC 9.83  --    HGB 11.9*  --    HCT 37.1  --      --    SODIUM 137  --    K 3.9  --      --    CO2 24  --    BUN 28*  --    CREATININE 1.07  --    GLUC 215*  --    AST 19  --    ALT 26  --    ALB 4.1  --    TBILI 0.43  --    ALKPHOS 50  --    PTT 29  --    INR 1.08  --    HSTNI0 4  --    HSTNI2  --  5       Imaging Results: I have personally reviewed pertinent images saved in PACS. CT scan findings (and other pertinent positive findings on images) were discussed with radiology. My interpretation of the images/reports are as follows:  Chest Xray(s): N/A   FAST exam(s): N/A   CT Scan(s): positive for acute findings: T8 fx   Additional Xray(s): N/A     Other Studies:   CT head without contrast   Final Result by Lisa Dash MD (11/01 3245)      No acute intracranial process. No skull fracture. Chronic microangiopathy. Workstation performed: [id number]         CTA dissection protocol chest abdomen pelvis w wo contrast   Final Result by Lisa Dash MD (61/06 6653)      CTA chest:      Acute fracture of anterior paravertebral ossification at the level of T7-8 with contiguous fracture extension into the T8 vertebral body. No significant displacement, vertebral body height loss or retropulsion.       Trace bilateral pleural effusions with minimal adjacent subsegmental atelectasis. No aortic dissection or aneurysm. No aortic or major branch vessel occlusion or significant stenosis. Incidental thyroid nodule(s) for which nonemergent thyroid ultrasound is recommended. Additional chronic findings and negatives as above. CTA abdomen and pelvis:      No aortic dissection. Stable minimal distal infrarenal aortic ectasia. Stable chronic multifocal moderate to severe left common iliac artery stenosis. No aortic or major branch vessel occlusion. Additional chronic findings and negatives as above. The study was marked in [facility] for immediate notification. Workstation performed: [id number]         XR spine thoracic 3 vw    (Results Pending)         Code Status: No Order  Advance Directive and Living Will:      Power of :    POLST:    I have spent 30 minutes with Patient and family today in which greater than 50% of this time was spent in counseling/coordination of care regarding Diagnostic results, Prognosis, Risks and benefits of tx options, Instructions for management, Patient and family education, Importance of tx compliance, Risk factor reductions, Impressions, Counseling / Coordination of care, Documenting in the medical record, Reviewing / ordering tests, medicine, procedures  , Obtaining or reviewing history  , and Communicating with other healthcare professionals .

## 2023-11-01 NOTE — TELEPHONE ENCOUNTER
11/2/23: DISCHARGED HOME  LMOM W/ OV DATE/ TIME / ADDRESS / IMAGING.  REQ CALL BACK TO CONFIRM .    11/1/23: INPATIENT    2WK HFU W/ AP  11/15/23 / 10:30 / Carmenza Dupont    IMAGING:  XRAY TSPINE    PER:   ROMAN Hung  2 week follow up with XR thoracic spine.  AP solo

## 2023-11-01 NOTE — OCCUPATIONAL THERAPY NOTE
Occupational Therapy Evaluation     Patient Name: Mary Anne Goldsmith. Today's Date: 11/1/2023  Problem List  Principal Problem:    Closed T8 spinal fracture [facility])  Active Problems:    Syncope and collapse    Past Medical History  Past Medical History:   Diagnosis Date    Accelerated essential hypertension     LAST ASSESSED 8/27/15    Benign positional vertigo 8/5/2016    Cataract, left eye 10/24/2017    Diverticulosis     Lumbar radiculopathy 12/7/2016    Polyp of colon 6/5/2017    Overview:  Added automatically from request for surgery [id number]    Prostatitis     LAST ASSESSED 3/17/15     Past Surgical History  Past Surgical History:   Procedure Laterality Date    CARDIOVASCULAR STRESS TEST             11/01/23 1113   OT Last Visit   OT Visit Date 11/01/23   Note Type   Note type Evaluation   Pain Assessment   Pain Assessment Tool 0-10   Restrictions/Precautions   Weight Bearing Precautions Per Order No   Braces or Orthoses TLSO   Other Precautions Spinal precautions;Pain   Home Living   Type of [address] [place] [address] Dr One level;Performs ADLs on one level; Able to live on main level with bedroom/bathroom;Stairs to enter with rails  (4-5 FERNANOD)   Bathroom Shower/Tub Tub/shower unit   Bathroom Toilet Standard   Bathroom Accessibility Accessible   Home Equipment Walker;Cane   Prior Function   Level of [place] Independent with ADLs; Independent with functional mobility; Independent with IADLS   Lives With Spouse   Receives Help From   (spouse if needed)   IADLs Independent with driving; Independent with meal prep; Independent with medication management   Falls in the last 6 months 1 to 4  (1 leading to admission)   Vocational Retired   Lifestyle   Autonomy pta pt was (I) w ADLs and IADLs, (+) , lives with spouse, 1 fall leading to admission   Reciprocal Relationships spouse   Service to Others retired   Intrinsic Gratification mowing the lawn, working outside   [employer]   Additional Pertinent History hyperlipidemia, hypertension, FIOR, PAD, COPD, type 2 diabetes, hernia,   Family/Caregiver Present Yes   Additional General Comments Wife was present during OT IE   ADL   Where Assessed Edge of bed   Eating Assistance 7  Independent   Grooming Assistance 6  Modified Independent   UB Bathing Assistance 6  Modified Independent   LB Bathing Assistance 6  Modified Independent   UB Dressing Assistance 6  Modified independent   [address]; Thread LUE;Pull around back; Increased time to complete  (don and doffed TLSO)   LB Dressing Assistance 6  Modified independent   LB Dressing Deficit Don/doff R sock; Don/doff L sock   Toileting Assistance  6  Modified independent   Bed Mobility   Additional Comments upon arrival pt was seated EOB with DYAN Geronimo. Pt was educated by PT on log rolling techique for spinal precautions   Transfers   Sit to Stand 6  Modified independent   Additional items Increased time required   Stand to Sit 6  Modified independent   Additional items Increased time required   Functional Mobility   Functional Mobility 6  Modified independent   Additional Comments completed fnxl mobility in ED hallway as mod I and had no complaints   Balance   Static Sitting Good   Dynamic Sitting Fair +   Static Standing Fair +   Dynamic Standing Fair +   Ambulatory Fair +   Activity Tolerance   Activity Tolerance Patient tolerated treatment well   Medical Staff Made Aware Grazyna PT aware   Nurse Made Aware rn aware   RUE Assessment   RUE Assessment WFL   LUE Assessment   LUE Assessment WFL   Vision-Basic Assessment   Current Vision No visual deficits  (Pt reports no visual changes)   Psychosocial   Psychosocial (WDL) WDL   Cognition   Overall Cognitive Status WFL   Arousal/Participation Alert; Cooperative   Attention Within functional limits   Orientation Level Oriented X4   Memory Within functional limits   Following Commands Follows multistep commands without difficulty   Assessment   Limitation   (spinal precautions)   Prognosis Good   Assessment Pt is a 70 y.o. male seen for OT evaluation s/p admission [facility] on 11/1/2023 due to fall. Diagnosed with Closed T8 spinal fracture ([address]). Personal and env factors supporting pt at time of IE include age, (I) PLOF, supportive wife, attitude towards recovery, and accessible home environment. Personal and env factors inhibiting engagement in occupations include difficulty completing IADLs and spinal precautions . Performance deficits that affect the pt’s occupational performance can be seen above. Despite pt's current functional limitations and medical complications pt is functioning at baseline. No further acute OT needs identified at this time. Recommend continued active ADL participation and mobilization with hospital staff while in the hospital to increase pt’s endurance and strength upon D/C. From OT standpoint, recommend D/C to home with family support when medically cleared. D/C pt from OT caseload at this time. Goals   Patient Goals to return home asap   Plan   Treatment Interventions Activityengagement   OT Frequency Eval only   Discharge Recommendation   Rehab Resource Intensity Level, OT No post-acute rehabilitation needs   Additional Comments  The patient's raw score on the -PAC Daily Activity Inpatient Short Form is 22. A raw score of greater than or equal to 19 suggests the patient may benefit from discharge to home. Please refer to the recommendation of the Occupational Therapist for safe discharge planning. AM-PAC Daily Activity Inpatient   Lower Body Dressing 3   Bathing 3   Toileting 4   Upper Body Dressing 4   Grooming 4   Eating 4   Daily Activity Raw Score 22   Daily Activity Standardized Score (Calc for Raw Score >=11) 47. 1   AM-PAC Applied Cognition Inpatient   Following a Speech/Presentation 4   Understanding Ordinary Conversation 4   Taking Medications 4   Remembering Where Things Are Placed or Put Away 4   Remembering List of 4-5 Errands 4 Taking Care of Complicated Tasks 4   Applied Cognition Raw Score 24   Applied Cognition Standardized Score 62.21   End of Consult   Education Provided Yes;Family or social support of family present for education by provider  (spinal precautions, dressing technique)   Patient Position at End of Consult Seated edge of bed; All needs within reach   Nurse Communication Nurse aware of consult     The patient's raw score on the AM-PAC Daily Activity Inpatient Short Form is 22. A raw score of greater than or equal to 19 suggests the patient may benefit from discharge to home. Please refer to the recommendation of the Occupational Therapist for safe discharge planning. This session, pt required and most appropriately benefited from skilled OT/PT co-eval due to unpredictable medical and/or functional status. OT and PT goals were addressed separately as seen in documentation.      Dana Valenzuela, OTR/L

## 2023-11-01 NOTE — ASSESSMENT & PLAN NOTE
Patient presented with acute abdominal pain with radiation from the back   Reports onset 2-3 days ago after back extension to drink followed by a syncopal event that resulted in a slow fall to the ground   Syncope events have happened before     Imaging:   CT Chest abdomen and pelvis 11/1/2023: acute fracture throught the anterior paravertebral ossification at T7-8 with extension through the vertebral body at T8. No posterior element involvement. No retropulsion or displacement. Plan:   ongoing frequent neurological checks  Imaging shows fracture through anterior osteophyte with VB extension, however posterior elements appear to be intact and this can likely be managed in a brace without surgical intervention  TLICS 4- distraction type fracture   Recommend TLSO brace to be fitted  Upright thoracic XR ordered     DVT ppx: SCD's  PT/OT evaluation  Ongoing medical management and pain control per primary team  CM following for dispo planning. Neurosurgery will review uprights when completed. Call with questions or concerns.

## 2023-11-01 NOTE — ASSESSMENT & PLAN NOTE
Patient presented with acute abdominal pain with radiation from the back   Reports onset 2-3 days ago after back extension to drink followed by a syncopal event that resulted in a slow fall to the ground   Syncope events have happened before     Imaging:   CT Chest abdomen and pelvis 11/1/2023: acute fracture throught the anterior paravertebral ossification at T7-8 with extension through the vertebral body at T8. No posterior element involvement. No retropulsion or displacement. Plan:   ongoing frequent neurological checks  Imaging shows fracture through anterior osteophyte with VB extension, however posterior elements appear to be intact and this can likely be managed in a brace without surgical intervention  Recommend TLSO brace to be fitted  Upright thoracic XR ordered     DVT ppx: SCD's  PT/OT evaluation  Ongoing medical management and pain control per primary team  CM following for dispo planning. Neurosurgery will review uprights when completed. Call with questions or concerns.

## 2023-11-01 NOTE — ASSESSMENT & PLAN NOTE
- T7/8 anterior osteophyte fracture extending into T8 vertebral body s/p syncope and collapse Talat 10/29  - Came in for uncontrolled epigastric pain and muscle spasms  - Neuro intact, no groin paraesthesias or incontinence  - Neurosurgery consult appreciated  - Non operative management  - TLSO brace, uprights  - Pain control  - PT and OT pending

## 2023-11-01 NOTE — CONSULTS
[facility]  Consult  Name: Chinmay Wright 70 y.o. male I MRN: [id number]  Unit/Bed#: ED-06 I Date of Admission: 11/1/2023   Date of Service: 11/1/2023 I Hospital Day: 0    Inpatient consult to Neurosurgery  Consult performed by: Catarina Flores PA-C  Consult ordered by: Wanda Bailon PA-C        Assessment/Plan   * Closed T8 spinal fracture [facility])  Assessment & Plan  Patient presented with acute abdominal pain with radiation from the back   Reports onset 2-3 days ago after back extension to drink followed by a syncopal event that resulted in a slow fall to the ground   Syncope events have happened before     Imaging:   CT Chest abdomen and pelvis 11/1/2023: acute fracture throught the anterior paravertebral ossification at T7-8 with extension through the vertebral body at T8. No posterior element involvement. No retropulsion or displacement. Plan:   ongoing frequent neurological checks  Imaging shows fracture through anterior osteophyte with VB extension, however posterior elements appear to be intact and this can likely be managed in a brace without surgical intervention  TLICS 4- distraction type fracture   Recommend TLSO brace to be fitted  Upright thoracic XR ordered     DVT ppx: SCD's  PT/OT evaluation  Ongoing medical management and pain control per primary team  CM following for dispo planning. Neurosurgery will review uprights when completed. Call with questions or concerns. History of Present Illness   HPI: Chinmay Wright is a 70 y.o. male with PMH including HTN, BPPV, syncope episodes who presents with abdominal pain radiating to his back through the middle of his chest.  Patient states this pain began 2 to 3 days ago after he had an episode of back extension followed by a near syncopal event which caused him to drop to his knees prior to losing consciousness. After the episode was when he began to have some pain.   States it progressed and worsened over the last 2 to 3 days prompting his presentation. I suspect he has a component of radiculopathy secondary to inflammation surrounding the nerve roots at T8. Patient states his pain is worse with movement. He has spasmodic pain episodes even while at rest although infrequent. He was given oral Dilaudid by the ER just prior to 5 AM which he states did not do too much for his discomfort. Per ED note, these episodes have happened before, in terms of syncope, specifically in the past when drinking a cold beverage while leaning backwards. Review of Systems   Constitutional:  Positive for activity change. Negative for fatigue. HENT: Negative. Respiratory: Negative. Cardiovascular: Negative. Gastrointestinal:  Positive for abdominal pain. Genitourinary:  Negative for difficulty urinating. Musculoskeletal:  Positive for back pain and gait problem. Neurological:  Negative for dizziness, speech difficulty, weakness, numbness and headaches. Psychiatric/Behavioral: Negative.          Historical Information   Past Medical History:   Diagnosis Date    Accelerated essential hypertension     LAST ASSESSED 8/27/15    Benign positional vertigo 2016    Cataract, left eye 10/24/2017    Diverticulosis     Lumbar radiculopathy 2016    Polyp of colon 2017    Overview:  Added automatically from request for surgery [id number]    Prostatitis     LAST ASSESSED 3/17/15     Past Surgical History:   Procedure Laterality Date    CARDIOVASCULAR STRESS TEST       Social History     Substance and Sexual Activity   Alcohol Use No     Social History     Substance and Sexual Activity   Drug Use No     Social History     Tobacco Use   Smoking Status Former    Packs/day: 2.00    Years: 30.00    Total pack years: 60.00    Types: Cigarettes    Quit date:     Years since quittin.8   Smokeless Tobacco Never     Family History   Problem Relation Age of Onset    Emphysema Father         LUNG    Stroke Father Meds/Allergies   all current active meds have been reviewed, current meds:   Current Facility-Administered Medications   Medication Dose Route Frequency    acetaminophen (TYLENOL) tablet 975 mg  975 mg Oral Q8H [address]    gabapentin (NEURONTIN) capsule 100 mg  100 mg Oral TID    lidocaine (LIDODERM) 5 % patch 2 patch  2 patch Topical Daily    methocarbamol (ROBAXIN) tablet 500 mg  500 mg Oral Q6H ANNA    ondansetron (ZOFRAN) injection 4 mg  4 mg Intravenous Once    oxyCODONE (ROXICODONE) IR tablet 5 mg  5 mg Oral Q4H PRN   , and PTA meds:   Prior to Admission Medications   Prescriptions Last Dose Informant Patient Reported? Taking?    Cholecalciferol (VITAMIN D3) 1000 UNIT/SPRAY LIQD  Self Yes No   Sig: Take by mouth   Cyanocobalamin (VITAMIN B-12) 1000 MCG/15ML LIQD  Self Yes No   Sig: Take 1 tablet by mouth daily   Fluticasone-Salmeterol (Advair Diskus) 250-50 mcg/dose inhaler   No No   Sig: Inhale 1 puff 2 (two) times a day   Folic Acid 20 MG CAPS  Self Yes No   Sig: Take 1 capsule by mouth daily   Omega-3 Fatty Acids (FISH OIL) 1,000 mg  Self Yes No   Sig: Take 1 capsule by mouth daily   amLODIPine (NORVASC) 10 mg tablet   No No   Sig: Take 1 tablet (10 mg total) by mouth daily   aspirin (ECOTRIN LOW STRENGTH) 81 mg EC tablet  Self Yes No   Sig: Take 81 mg by mouth daily   atorvastatin (LIPITOR) 20 mg tablet   No No   Sig: Take 1 tablet (20 mg total) by mouth daily   carvedilol (COREG) 25 mg tablet   No No   Sig: Take 1 tablet (25 mg total) by mouth 2 (two) times a day   chlorthalidone 25 mg tablet   No No   Sig: Take 1.5 tablets (37.5 mg total) by mouth daily   metFORMIN (GLUCOPHAGE) 850 mg tablet   No No   Sig: Take 1 tablet (850 mg total) by mouth 2 (two) times a day with meals   omeprazole (PriLOSEC) 40 MG capsule  Self Yes No   Sig: Take 1 capsule by mouth 2 (two) times a day   polyethylene glycol-propylene glycol (SYSTANE) 0.4-0.3 %  Self Yes No   Sig: Apply to eye   triamcinolone (KENALOG) 0.1 % cream No No   Sig: Apply topically 2 (two) times a day   valsartan (DIOVAN) 320 MG tablet   No No   Sig: Take 1 tablet (320 mg total) by mouth daily      Facility-Administered Medications: None     No Known Allergies    Objective   I/O         10/30 0701  10/31 0700 10/31 0701 11/01 0700 11/01 0701 11/02 0700    IV Piggyback  500     Total Intake  500     Net  +500                    Physical Exam  Constitutional:       Appearance: Normal appearance. He is well-developed. He is obese. HENT:      Head: Normocephalic and atraumatic. Eyes:      Extraocular Movements: Extraocular movements intact and EOM normal.      Pupils: Pupils are equal, round, and reactive to light. Neck:      Vascular: No JVD. Cardiovascular:      Rate and Rhythm: Normal rate. Pulmonary:      Effort: Pulmonary effort is normal.   Abdominal:      Comments: Subjective pain. No pain to palpation    Musculoskeletal:         General: Tenderness present. No deformity. Normal range of motion. Cervical back: Normal range of motion and neck supple. Skin:     General: Skin is warm and dry. Neurological:      Mental Status: He is alert and oriented to person, place, and time. Cranial Nerves: No cranial nerve deficit. Sensory: No sensory deficit. Motor: Motor strength is normal.No weakness. Deep Tendon Reflexes: Reflexes are normal and symmetric. Psychiatric:         Speech: Speech normal.         Behavior: Behavior normal.         Thought Content: Thought content normal.       Neurologic Exam     Mental Status   Oriented to person, place, and time. Attention: normal.   Speech: speech is normal   Level of consciousness: alert  Knowledge: good. Normal comprehension. Cranial Nerves     CN III, IV, VI   Pupils are equal, round, and reactive to light. Extraocular motions are normal.   Upgaze: normal  Downgaze: normal    CN V   Facial sensation intact. CN VII   Facial expression full, symmetric.      CN VIII   CN VIII normal.   Hearing: intact    CN XII   CN XII normal.     Motor Exam   Muscle bulk: normal  Right arm tone: normal  Left arm tone: normal  Right leg tone: normal  Left leg tone: normal    Strength   Strength 5/5 throughout. Sensory Exam   Light touch normal.     Gait, Coordination, and Reflexes     Tremor   Resting tremor: absent  Action tremor: absent      Vitals:Blood pressure (!) 179/81, pulse 58, temperature 98 °F (36.7 °C), temperature source Oral, resp. rate 18, SpO2 95 %. ,There is no height or weight on file to calculate BMI. Lab Results:   Results from last 7 days   Lab Units 11/01/23  0344   WBC Thousand/uL 9.83   HEMOGLOBIN g/dL 11.9*   HEMATOCRIT % 37.1   PLATELETS Thousands/uL 160   NEUTROS PCT % 66   MONOS PCT % 6   EOS PCT % 8*     Results from last 7 days   Lab Units 11/01/23  0344   SODIUM mmol/L 137   POTASSIUM mmol/L 3.9   CHLORIDE mmol/L 105   CO2 mmol/L 24   BUN mg/dL 28*   CREATININE mg/dL 1.07   CALCIUM mg/dL 9.5   ALK PHOS U/L 50   ALT U/L 26   AST U/L 19             Results from last 7 days   Lab Units 11/01/23  0344   INR  1.08   PTT seconds 29     No results found for: "TROPONINT"  ABG:No results found for: "PHART", "KGJ5OME", "PO2ART", "VKI4LIA", "D1JXGBPV", "BEART", "SOURCE"    Imaging Studies: I have personally reviewed pertinent reports. and I have personally reviewed pertinent films in PACS    CTA dissection protocol chest abdomen pelvis w wo contrast    Result Date: 11/1/2023  Impression: CTA chest: Acute fracture of anterior paravertebral ossification at the level of T7-8 with contiguous fracture extension into the T8 vertebral body. No significant displacement, vertebral body height loss or retropulsion. Trace bilateral pleural effusions with minimal adjacent subsegmental atelectasis. No aortic dissection or aneurysm. No aortic or major branch vessel occlusion or significant stenosis. Incidental thyroid nodule(s) for which nonemergent thyroid ultrasound is recommended. Additional chronic findings and negatives as above. CTA abdomen and pelvis: No aortic dissection. Stable minimal distal infrarenal aortic ectasia. Stable chronic multifocal moderate to severe left common iliac artery stenosis. No aortic or major branch vessel occlusion. Additional chronic findings and negatives as above. The study was marked in [facility] for immediate notification. Workstation performed: [id number]     CT head without contrast    Result Date: 11/1/2023  Impression: No acute intracranial process. No skull fracture. Chronic microangiopathy. Workstation performed: [id number]       EKG, Pathology, and Other Studies: I have personally reviewed pertinent reports. VTE Prophylaxis: Sequential compression device Chris Santo     Code Status: No Order  Advance Directive and Living Will:      Power of :    POLST:      Counseling / Coordination of Care  I spent 30 minutes with the patient.

## 2023-11-01 NOTE — ASSESSMENT & PLAN NOTE
- Sunday patient tipped his head back to take a drink and felt the drink go down, causing severe epigastric pain and then he syncopized  - PMH GERD, large hiatal hernia, takes omeprazole 40 BID  - Pt continues to have epigastric pain  - EKG with heart block which is new compared to EKG in 2015  - Troponins negative  - Likely vasovagal syncope as patient reports this same event has happened 3 times in the past year  - ECHO pending  - Check Orthostatic BP

## 2023-11-01 NOTE — INCIDENTAL FINDINGS
The following findings require follow up:  Radiographic finding   Finding:     Trace bilateral pleural effusions with minimal adjacent subsegmental atelectasis. Incidental thyroid nodule(s) for which nonemergent thyroid ultrasound is recommended.      Follow up required: Routine   Follow up should be done within 1 month(s)   Discussed with patient and his wife at bedside    Please notify the following clinician to assist with the follow up:  Primary Care Provider  Riya Riso MD Phone  [phone]

## 2023-11-01 NOTE — ED NOTES
Patient requesting dilaudid prior to CT scan. Primary RN notified.       Severiano Henle, RN  11/01/23 9103

## 2023-11-01 NOTE — PHYSICAL THERAPY NOTE
PHYSICAL THERAPY EVALUATION NOTE          Patient Name: Gary Schultz. Today's Date: 2023          AGE:   70 y.o. Mrn:   [id number]  ADMIT DX:  LOC (loss of consciousness) ([address]) [R40.20]    Past Medical History:  Past Medical History:   Diagnosis Date    Accelerated essential hypertension     LAST ASSESSED 8/27/15    Benign positional vertigo 2016    Cataract, left eye 10/24/2017    Diverticulosis     Lumbar radiculopathy 2016    Polyp of colon 2017    Overview:  Added automatically from request for surgery [id number]    Prostatitis     LAST ASSESSED 3/17/15       Past Surgical History:  Past Surgical History:   Procedure Laterality Date    CARDIOVASCULAR STRESS TEST       Length Of Stay: 0            Orthotic Fitting: Total Time: 10 min    Orthotic Ordered: TLSO (backpack)  Orthotic Ordered by: Neurosurgery TANYA Collins       Objective: Pt educated on TLSO brace fit, alignment, and wearing schedule w/ pt agreeable to brace. Brace measured and fit for aspen horizon 456 brace w/ bilateral extension panels while pt sitting at EOB. Pt requires mod assistance for donning/doffing brace at this time, as well as for adjustment of brace as needed. Pt verbalized understanding of donning/doffing brace, when to wear brace. RN aware. Pt confirmed no further questions/concerns at this time. Pt's wife present throughout, pt provided w/ educational handout; confirmed no further questions or concerns at this time. Hector Zhu, PT, DPT  23            PHYSICAL THERAPY EVALUATION:    Patient's identity confirmed via 2 patient identifiers (full name and ) at start of session       23 1105   PT Last Visit   PT Visit Date 23   Note Type   Note type Evaluation   Pain Assessment   Pain Assessment Tool 0-10   Pain Score   (0/10 at rest, "it's a 10/10 when it hits")   Pain Location/Orientation Orientation: Upper; Location: Abdomen   Pain Radiating Towards posterior to back   Pain Onset/Description Frequency: Intermittent; Descriptor: Stabbing; Descriptor: Mendel Shines   Patient's Stated Pain Goal No pain   Hospital Pain Intervention(s) Repositioned; Ambulation/increased activity; Emotional support  (TR updated)   Restrictions/Precautions   Weight Bearing Precautions Per Order No   Braces or Orthoses TLSO   Other Precautions Fall Risk;Spinal precautions;Pain   Home Living   Type of [address] [place] [address] [address] One level;Performs ADLs on one level; Able to live on main level with bedroom/bathroom;Stairs to enter with rails  (4-5 FERNANDO)   Bathroom Shower/Tub Tub/shower unit   Bathroom Toilet Standard   Bathroom Accessibility Accessible   Home Equipment Walker;Cane   Prior Function   Level of [place] Independent with functional mobility   Lives With Spouse   Receives Help From Family   IADLs Independent with driving; Independent with meal prep; Independent with medication management   Falls in the last 6 months 1 to 4   Comments Pt reports at baseline he amb ind w/o AD, performs all ADLs and IADLs ind   General   Additional Pertinent History Neurosurgery: closed T8 spinal fx, TLSO brace, PT/OT evaluation.  Pt declined bowel/bladder incontinence to PT/OT   Family/Caregiver Present Yes  (pt's wife)   Cognition   Overall Cognitive Status WFL   Arousal/Participation Cooperative   Attention Within functional limits   Orientation Level Oriented X4   Memory Within functional limits   Following Commands Follows all commands and directions without difficulty   Comments Pt ID via name and ; pt agreeable to PT eval, mobility, and TLSO brace   RLE Assessment   RLE Assessment WFL  (grossly assessed w/ functional mobility)   LLE Assessment   LLE Assessment WFL  (grossly assessed w/ functional mobility)   Light Touch   RLE Light Touch Grossly intact   LLE Light Touch Grossly intact   Bed Mobility   Supine to Sit 6  Modified independent   Additional items Increased time required  (VC for log roll technique)   Additional Comments TLSO fit at EOB   Transfers   Sit to Stand 6  Modified independent   Additional items Increased time required   Stand to Sit 6  Modified independent   Additional items Verbal cues   Ambulation/Elevation   Gait pattern Wide STEPHANI; Decreased foot clearance; Short stride   Gait Assistance 6  Modified independent   Assistive Device None   Distance 79'   Stair Management Assistance   (pt demonstrated ability to perform bilateral standing marching in place x6 independently w/o UE support, able to weight-shift into single leg stance to allow for stair negotiation)   Balance   Static Sitting Good   Dynamic Sitting Fair +   Static Standing Fair +   Dynamic Standing Fair   Ambulatory Fair   Activity Tolerance   Activity Tolerance Patient limited by pain   Medical Staff Made Aware OT Denise, Trauma AP [facility]   Nurse Made Aware RNs Kavita [facility]   Assessment   Prognosis Good   Problem List Pain;Orthopedic restrictions   Assessment Gary Dangelo is a 70 y.o. Male who [facility] due to fall and diagnosis of closed T8 spinal fracture. Orders for PT eval and treat received, w/ TLSO ordered. Comorbidities affecting pt's functional mobility at time of evaluation include: HTN, FIOR, PAD, COPD, DM, neuroendocrine tumor. Personal factors affecting DC include: stairs to enter home and positive fall history. At baseline, pt mobilizes independently w/ no AD, and w/ 1 fall(s) in the previous 6 months. Upon evaluation, pt presents w/ the following deficits: pain affecting mobility and gait deviations. Pt currently requires mod I for bed mobility, mod I for transfers, and mod I for ambulation. Pt's clinical presentation is unstable/unpredictable due to poor blood pressure control, pain affecting mobility tolerance, recent h/o falls, ongoing medical management.  From a PT/mobility standpoint given the above findings, DC recommendation is level: No post acute rehabilitation needs. During current admission, pt will benefit from continued skilled inpatient PT in the acute care setting in order to address the above deficits and to maximize function and mobility prior to DC from acute care. Goals   Patient Goals to go home as soon as possible   STG Expiration Date 11/11/23   Short Term Goal #1 Pt will: recall 3/3 spinal precautions and TLSO brace wearing schedule w/o VC; maintain spinal precautions 100% of the time during functional mobility w/o VC to promote protection of thoracic spine; lila/doff TLSO brace at independently to increase pt's independence; perform bilateral rolling bed mobility w/ mod I to promote repositioning in a supine position; perform supine<>sitting at EOB mobility w/ mod I to increase pt's independence w/ functional mobility; perform transfers independently to increase pt's OOB mobility; ambulate at least 350' independently to increase pt's ambulatory endurance; negotiate at least 5 steps w/ UE support and mod I to facilitate pt returning to home living environment; increase all balance ratings by at least 1 grade to decrease pt's risk of falls   PT Treatment Day 0   Plan   Treatment/Interventions Functional transfer training;Elevations; Patient/family training;Equipment eval/education; Bed mobility;Gait training; Compensatory technique education   PT Frequency 1-2x/wk   Discharge Recommendation   Rehab Resource Intensity Level, PT No post-acute rehabilitation needs   AM-PAC Basic Mobility Inpatient   Turning in Flat Bed Without Bedrails 4   Lying on Back to Sitting on Edge of Flat Bed Without Bedrails 4   Moving Bed to Chair 4   Standing Up From Chair Using Arms 4   Walk in Room 4   Climb 3-5 Stairs With Railing 4   Basic Mobility Inpatient Raw Score 24   Basic Mobility Standardized Score 57.68   Highest Level Of Mobility   JH-HLM Goal 8: Walk 250 feet or more   JH-HLM Achieved 7: Walk 25 feet or more   End of Consult   Patient Position at End of Consult Seated edge of bed; All needs within reach  (TLSO devonte antunez, pt's wife present in room)       The patient's AM-PAC Basic Mobility Inpatient Short Form Raw Score is 24. A Raw score of greater than 16 suggests the patient may benefit from discharge to home. Please also refer to the recommendation of the Physical Therapist for safe discharge planning.     Pt will benefit from skilled inpatient PT during this admission in order to facilitate progress towards goals and to maximize functional independence prior to [facility] rec:  No post acute rehabilitation needs        Micheal Abad, PT, DPT  11/01/23

## 2023-11-01 NOTE — ED NOTES
Pt ambulatory to restroom. Steady, independent gait. Denies dizziness. Spine brace in place.       Nithya Leone RN  11/01/23 9416

## 2023-11-02 ENCOUNTER — TELEPHONE (OUTPATIENT)
Dept: FAMILY MEDICINE CLINIC | Facility: CLINIC | Age: 78
End: 2023-11-02

## 2023-11-02 ENCOUNTER — TELEPHONE (OUTPATIENT)
Age: 78
End: 2023-11-02

## 2023-11-02 NOTE — TELEPHONE ENCOUNTER
Cassandra Mckeon was in the ER yesterday, he fainted on Sunday and hurt his back. Wants to follow up with Elian Savage to discuss him imaging tests he had done in the ER. Attempted to reach the office to better accommodate him with finding an appt time. Patient cannot come in this afternoon.  Please advise

## 2023-11-02 NOTE — TELEPHONE ENCOUNTER
Spoke to Dr. Africa Joiner and will let me know where to put him in for appointment. . patient aware

## 2023-11-03 ENCOUNTER — OFFICE VISIT (OUTPATIENT)
Dept: FAMILY MEDICINE CLINIC | Facility: CLINIC | Age: 78
End: 2023-11-03
Payer: COMMERCIAL

## 2023-11-03 VITALS
TEMPERATURE: 97.6 F | HEIGHT: 69 IN | WEIGHT: 238 LBS | BODY MASS INDEX: 35.25 KG/M2 | HEART RATE: 53 BPM | SYSTOLIC BLOOD PRESSURE: 162 MMHG | OXYGEN SATURATION: 98 % | RESPIRATION RATE: 20 BRPM | DIASTOLIC BLOOD PRESSURE: 90 MMHG

## 2023-11-03 DIAGNOSIS — G47.33 OSA (OBSTRUCTIVE SLEEP APNEA): ICD-10-CM

## 2023-11-03 DIAGNOSIS — J44.9 CHRONIC OBSTRUCTIVE PULMONARY DISEASE, UNSPECIFIED COPD TYPE (HCC): ICD-10-CM

## 2023-11-03 DIAGNOSIS — K22.70 BARRETT'S ESOPHAGUS WITHOUT DYSPLASIA: ICD-10-CM

## 2023-11-03 DIAGNOSIS — I73.9 PAD (PERIPHERAL ARTERY DISEASE) (HCC): ICD-10-CM

## 2023-11-03 DIAGNOSIS — S22.069A CLOSED FRACTURE OF SEVENTH THORACIC VERTEBRA, UNSPECIFIED FRACTURE MORPHOLOGY, INITIAL ENCOUNTER (HCC): ICD-10-CM

## 2023-11-03 DIAGNOSIS — K21.9 GASTROESOPHAGEAL REFLUX DISEASE WITHOUT ESOPHAGITIS: ICD-10-CM

## 2023-11-03 DIAGNOSIS — D50.9 MICROCYTIC ANEMIA: ICD-10-CM

## 2023-11-03 DIAGNOSIS — K76.0 FATTY LIVER: ICD-10-CM

## 2023-11-03 DIAGNOSIS — R80.9 TYPE 2 DIABETES MELLITUS WITH MICROALBUMINURIA, WITHOUT LONG-TERM CURRENT USE OF INSULIN: ICD-10-CM

## 2023-11-03 DIAGNOSIS — Z23 ENCOUNTER FOR IMMUNIZATION: ICD-10-CM

## 2023-11-03 DIAGNOSIS — I10 PRIMARY HYPERTENSION: ICD-10-CM

## 2023-11-03 DIAGNOSIS — E11.29 TYPE 2 DIABETES MELLITUS WITH MICROALBUMINURIA, WITHOUT LONG-TERM CURRENT USE OF INSULIN: ICD-10-CM

## 2023-11-03 DIAGNOSIS — S22.069A CLOSED FRACTURE OF EIGHTH THORACIC VERTEBRA, UNSPECIFIED FRACTURE MORPHOLOGY, INITIAL ENCOUNTER (HCC): Primary | ICD-10-CM

## 2023-11-03 DIAGNOSIS — R55 SYNCOPE AND COLLAPSE: ICD-10-CM

## 2023-11-03 DIAGNOSIS — E11.42 DIABETIC POLYNEUROPATHY ASSOCIATED WITH TYPE 2 DIABETES MELLITUS (HCC): ICD-10-CM

## 2023-11-03 DIAGNOSIS — E04.2 MULTIPLE THYROID NODULES: ICD-10-CM

## 2023-11-03 PROCEDURE — 99215 OFFICE O/P EST HI 40 MIN: CPT | Performed by: FAMILY MEDICINE

## 2023-11-03 PROCEDURE — G0008 ADMIN INFLUENZA VIRUS VAC: HCPCS

## 2023-11-03 PROCEDURE — 90662 IIV NO PRSV INCREASED AG IM: CPT

## 2023-11-03 NOTE — PROGRESS NOTES
Name: Jose Harper. : 1945      MRN: [id number]  Encounter Provider: Flor Anthony MD  Encounter Date: 11/3/2023   Encounter department: [address] [place] [address]     1. Closed fracture of eighth thoracic vertebra, unspecified fracture morphology, initial encounter ([address])    2. Closed fracture of seventh thoracic vertebra, unspecified fracture morphology, initial encounter ([address])    3. Syncope and collapse  -     VAS carotid complete study; Future; Expected date: 2023    4. Multiple thyroid nodules  -     US thyroid; Future; Expected date: 2023    5. Microcytic anemia    6. Type 2 diabetes mellitus with microalbuminuria, without long-term current use of insulin   -     Hemoglobin A1C; Future; Expected date: 2023    7. Diabetic polyneuropathy associated with type 2 diabetes mellitus ([address])    8. PAD (peripheral artery disease) ([address])    9. Primary hypertension    10. FIOR (obstructive sleep apnea)    11. Chronic obstructive pulmonary disease, unspecified COPD type ([address])    12. Gastroesophageal reflux disease without esophagitis    13. Kessler's esophagus without dysplasia    14. Fatty liver    15. Encounter for immunization  -     influenza vaccine, high-dose, PF 0.7 mL (FLUZONE HIGH-DOSE)    Continue with current medications, back brace. Follow up with Neurosurgery. I reviewed "red flag" symptoms. Schedule thyroid u/s and carotid artery dopplers. Repeat labs CBC and iron studies. Monitor BP at home. Depression Screening and Follow-up Plan: Patient was screened for depression during today's encounter. They screened negative with a PHQ-2 score of 0. Subjective     Follow up for ER visit. S/p fall sustaining thoracic spine vertebral fractures. Patient was in the kitchen he remember tilting his head back to drink out of a container when passed out and fell backward. No tongue biting. No loss of bowel or bladder function.  He has had a similar episode in the past.  X rays thoracic spine T7 and T8 vertebral body fractures. CT head  No acute intracranial process. No skull fracture. Chronic microangiopathy. CTA Acute fracture of anterior paravertebral ossification at the level of T7-8 with contiguous fracture extension into the T8 vertebral body. No significant displacement, vertebral body height loss or retropulsion. Trace bilateral pleural effusions with minimal adjacent subsegmental atelectasis. No aortic dissection or aneurysm. No aortic or major branch vessel occlusion or significant stenosis. Incidental thyroid nodule(s). Echo Left Ventricle: Left ventricular cavity size is normal. Wall thickness is normal. The left ventricular ejection fraction is 60%. Systolic function is normal. Wall motion is normal. Diastolic function is mildly abnormal, consistent with grade I (abnormal) relaxation  Left Atrium: The atrium is dilated. Aortic Valve: There is aortic valve sclerosis. Patient is currently wearing a back back. He was started on Gabapentin. He is using PRN ES Tylenol and Robaxin for pain. No leg weakness. No loss of bowel or bladder function. Type 2 DM   on Metformin 850 mg BID. Side effects on 1,000 mg BID. 07/2023  A1c 7.3 increased from 6.8 12/2022 Urine micro albumin 118 decreased from  139  on ARB. No hypoglycemic events. Mild neuropathy symptoms numbness- no pain. Lat eye exam 08/2018 no diabetic retinopathy. Hypertension on current multi drug regimen 11/2023 creatinine 1.07. GFR 67. Electrolytes normal . Hgb 11.9 . Prior Nephrology evaluation and workup for secondary causes of hypertension- metanephrine normal at 47. normetanephrine elevated at 183. renin 19.27. aldosterone 2.2. Renal artery dopplers 03/2015 no ISIAH. kidneys hypertrophic. 2.8 cm cyst left kidney. Mixed hyperlipidemia and PAD on Atorvastatin 20 mg/day-side effects from 40 mg dose- and 2 fish oil capsules a day. 07/2023 Lipid profile cholesterol 118. TGs 199 decreased from 244.  HDL 33. LDL 45. LFTs normal. 06/2022 TSH 1.980. 02/2020 CT scan abdomen and pelvis ordered by his surgeon for recurrent abdominal pain. Incidental finding significant triple-vessel coronary artery calcification. No pericardial effusion. Scattered sub 3 mm central lobular calcified and noncalcified pulmonary nodules. Atherosclerotic changes of aorta no aneurysm. Procedure: XR spine thoracic 3 vw    Result Date: 11/1/2023  Narrative: THORACIC SPINE INDICATION:   repeat uprights with TLSO brace. COMPARISON: 11/1/2023 VIEWS:  XR SPINE THORACIC 3 VW FINDINGS: There is no fracture or pathologic bone lesion. Thoracic vertebral alignment is within normal limits. Grossly stable appearance of the T7 and T8 vertebral bodies with fractures seen to better advantage on prior CT. There is no displacement of the paraspinal line. The pedicles appear intact. Impression: Grossly stable appearance of the T7 and T8 vertebral bodies with fracture seen to better advantage on CT. No interval vertebral body loss of height. Workstation performed: [id number]     Procedure: Echo complete w/ contrast if indicated    Result Date: 11/1/2023  Narrative:   Left Ventricle: Left ventricular cavity size is normal. Wall thickness is normal. The left ventricular ejection fraction is 60%. Systolic function is normal. Wall motion is normal. Diastolic function is mildly abnormal, consistent with grade I (abnormal) relaxation. Left Atrium: The atrium is dilated. Aortic Valve: There is aortic valve sclerosis. Procedure: XR spine thoracic 3 vw    Result Date: 11/1/2023  Narrative: THORACIC SPINE INDICATION:   T7-8 fracture. COMPARISON: Same day CT chest abdomen pelvis VIEWS:  XR SPINE THORACIC 3 VW FINDINGS: Known T7 and T8 vertebral body fractures are better evaluated on same day CT of the chest abdomen and pelvis. No new fractures are identified. Thoracic vertebral alignment is within normal limits.  Mild degenerative changes of the thoracic spine. There is no displacement of the paraspinal line. The pedicles appear intact. Impression: Known T7 and T8 vertebral body fractures are better evaluated on same day CT of the chest abdomen and pelvis. Workstation performed: [id number]     Procedure: CTA dissection protocol chest abdomen pelvis w wo contrast    Result Date: 11/1/2023  Narrative: CTA - CHEST, ABDOMEN AND PELVIS - WITHOUT AND WITH IV CONTRAST INDICATION:   Syncope, severe abdominal pain shooting through to back. COMPARISON: CT chest 8/2/2015 and CT abdomen and pelvis 2/27/2020 TECHNIQUE: CT examination of the chest, abdomen and pelvis was performed both prior to and after the administration of intravenous contrast.  The noncontrast portion of this examination was performed utilizing low radiation dose technique. Thin section  angiographic arterial phase post contrast technique was used in order to evaluate for aortic dissection. 3D reformatted images and volume rendering were performed on an independent workstation. Additionally, axial, sagittal, and coronal 2D reformatted  images were created from the source data and submitted for interpretation. Radiation dose length product (DLP) for this visit:  455 3072 mGy-cm . This examination, like all CT scans performed in the [facility], was performed utilizing techniques to minimize radiation dose exposure, including the use of iterative reconstruction and automated exposure control. IV Contrast:  100 mL of iohexol (OMNIPAQUE) 350 Enteric Contrast:  Enteric contrast was not administered. FINDINGS: AORTA:  There is no aortic dissection or intramural hematoma. There is no intrathoracic aortic aneurysm. Stable infrarenal aortic ectasia maximum diameter 2.1 cm. Atherosclerotic disease of the aorta and major aortic branches. No aortic or major branch vessel occlusion. Multifocal moderate to severe stenosis of the left common iliac artery, stable. No other significant stenosis. CHEST LUNGS: Trace bilateral lower lobe dependent basilar subsegmental atelectasis. Minimal bilateral lower lobe juxtapleural scarring. Mild COPD. No suspicious pulmonary nodule. Multiple punctate posterior predominant calcifications in the lower lobes attributed to diffuse pulmonary ossification. Few scattered calcified granulomata. Central airways are clear. PLEURA: Trace bilateral pleural effusions. Minimal bilateral medial basilar pleural thickening likely scar. HEART/PULMONARY ARTERIAL TREE: Heart is not enlarged. No pericardial effusion. Aortic and coronary artery calcification. Within the limitations of this examination there is no evidence of pulmonary embolus. MEDIASTINUM AND JESUS:  Unremarkable. CHEST WALL AND LOWER NECK:   Multinodular thyroid gland. Trace bilateral gynecomastia, stable. ABDOMEN LIVER/BILIARY TREE:  Unremarkable. GALLBLADDER:  No calcified gallstones. No pericholecystic inflammatory change. SPLEEN:  Unremarkable. PANCREAS:  Unremarkable. ADRENAL GLANDS:  Unremarkable. KIDNEYS/URETERS: One or more sharply circumscribed subcentimeter renal hypodensities are noted. These lesions are too small to accurately characterize, but are statistically most likely to represent benign cortical renal cyst(s). According to the guidelines published in the [facility] Paper of the ACR Incidental Findings Committee (Radiology 2010), no further workup of these lesions is recommended. 3.6 cm left renal exophytic Bosniak 2 cyst, stable. No perinephric collection. STOMACH AND BOWEL:  Unremarkable. APPENDIX: A normal appendix was visualized. ABDOMINOPELVIC CAVITY:  No ascites or free intraperitoneal air. No lymphadenopathy. PELVIS REPRODUCTIVE ORGANS: The prostate is enlarged. URINARY BLADDER:  Unremarkable. ABDOMINAL WALL/INGUINAL REGIONS: Small fat-containing umbilical hernia. Small bilateral inguinal fat-containing hernias, left larger than right.  OSSEOUS STRUCTURES: Acute fracture across previously continuous solid anterior paravertebral ossification at the level of T7-8 with contiguous fracture of the anterior superior T8 vertebral body with bilateral posterior inferior fracture extension. No significant displacement. Trace anterior paravertebral fat stranding at the level of the fracture. Multilevel thoracolumbar paravertebral ossification in a pattern suggestive of diffuse idiopathic skeletal hyperostosis. Mild levoconvex lumbar scoliosis. No osseous destructive lesion identified. Degenerative changes of the spine, pubic symphysis, and multiple joints. Impression: CTA chest: Acute fracture of anterior paravertebral ossification at the level of T7-8 with contiguous fracture extension into the T8 vertebral body. No significant displacement, vertebral body height loss or retropulsion. Trace bilateral pleural effusions with minimal adjacent subsegmental atelectasis. No aortic dissection or aneurysm. No aortic or major branch vessel occlusion or significant stenosis. Incidental thyroid nodule(s) for which nonemergent thyroid ultrasound is recommended. Additional chronic findings and negatives as above. CTA abdomen and pelvis: No aortic dissection. Stable minimal distal infrarenal aortic ectasia. Stable chronic multifocal moderate to severe left common iliac artery stenosis. No aortic or major branch vessel occlusion. Additional chronic findings and negatives as above. The study was marked in [facility] for immediate notification. Workstation performed: [id number]     Procedure: CT head without contrast    Result Date: 11/1/2023  Narrative: CT BRAIN - WITHOUT CONTRAST INDICATION:   Syncope, LOC. COMPARISON:  None. TECHNIQUE:  CT examination of the brain was performed. Multiplanar 2D reformatted images were created from the source data. Radiation dose length product (DLP) for this visit:  1120 mGy-cm .   This examination, like all CT scans performed in the [facility], was performed utilizing techniques to minimize radiation dose exposure, including the use of iterative reconstruction and automated exposure control. IMAGE QUALITY:  Diagnostic. FINDINGS: PARENCHYMA:  No intracranial mass, mass effect or midline shift. No CT signs of acute infarction. No acute parenchymal hemorrhage. Periventricular, centrum semiovale, and subcortical white matter hypodensity is a nonspecific finding likely representing chronic microangiopathy. Calcification bilateral cavernous internal carotid arteries. VENTRICLES AND EXTRA-AXIAL SPACES: No acute hydrocephalus. Mild prominence of CSF spaces diffusely attributed to generalized volume loss. VISUALIZED ORBITS: Changes of left-sided lens replacement noted. PARANASAL SINUSES: Subtotal opacification of the right maxillary sinus with both hyperdense and hypodense components which may polypoid mucosal thickening and mucous retention cysts. Small mucous retention cysts in the left maxillary sinus and left anterior frontoethmoidal recess. Minimal mucosal thickening in the ethmoid sinuses. CALVARIUM AND EXTRACRANIAL SOFT TISSUES: Trace right parietal scalp linear soft tissue density likely scar. No skull fracture. Impression: No acute intracranial process. No skull fracture. Chronic microangiopathy.  Workstation performed: [id number]         Recent Results (from the past 336 hour(s))   Comprehensive metabolic panel    Collection Time: 11/01/23  3:44 AM   Result Value Ref Range    Sodium 137 135 - 147 mmol/L    Potassium 3.9 3.5 - 5.3 mmol/L    Chloride 105 96 - 108 mmol/L    CO2 24 21 - 32 mmol/L    ANION GAP 8 mmol/L    BUN 28 (H) 5 - 25 mg/dL    Creatinine 1.07 0.60 - 1.30 mg/dL    Glucose 215 (H) 65 - 140 mg/dL    Calcium 9.5 8.4 - 10.2 mg/dL    AST 19 13 - 39 U/L    ALT 26 7 - 52 U/L    Alkaline Phosphatase 50 34 - 104 U/L    Total Protein 6.8 6.4 - 8.4 g/dL    Albumin 4.1 3.5 - 5.0 g/dL    Total Bilirubin 0.43 0.20 - 1.00 mg/dL    eGFR 67 ml/min/1.73sq m   HS Troponin 0hr (reflex protocol)    Collection Time: 11/01/23  3:44 AM   Result Value Ref Range    hs TnI 0hr 4 "Refer to ACS Flowchart"- see link ng/L   CBC and differential    Collection Time: 11/01/23  3:44 AM   Result Value Ref Range    WBC 9.83 4.31 - 10.16 Thousand/uL    RBC 4.59 3.88 - 5.62 Million/uL    Hemoglobin 11.9 (L) 12.0 - 17.0 g/dL    Hematocrit 37.1 36.5 - 49.3 %    MCV 81 (L) 82 - 98 fL    MCH 25.9 (L) 26.8 - 34.3 pg    MCHC 32.1 31.4 - 37.4 g/dL    RDW 16.2 (H) 11.6 - 15.1 %    MPV 11.8 8.9 - 12.7 fL    Platelets 177 610 - 632 Thousands/uL    nRBC 0 /100 WBCs    Neutrophils Relative 66 43 - 75 %    Immat GRANS % 1 0 - 2 %    Lymphocytes Relative 18 14 - 44 %    Monocytes Relative 6 4 - 12 %    Eosinophils Relative 8 (H) 0 - 6 %    Basophils Relative 1 0 - 1 %    Neutrophils Absolute 6.56 1.85 - 7.62 Thousands/µL    Immature Grans Absolute 0.08 0.00 - 0.20 Thousand/uL    Lymphocytes Absolute 1.77 0.60 - 4.47 Thousands/µL    Monocytes Absolute 0.59 0.17 - 1.22 Thousand/µL    Eosinophils Absolute 0.74 (H) 0.00 - 0.61 Thousand/µL    Basophils Absolute 0.09 0.00 - 0.10 Thousands/µL   Protime-INR    Collection Time: 11/01/23  3:44 AM   Result Value Ref Range    Protime 14.7 (H) 11.6 - 14.5 seconds    INR 1.08 0.84 - 1.19   APTT    Collection Time: 11/01/23  3:44 AM   Result Value Ref Range    PTT 29 23 - 37 seconds   Lipase    Collection Time: 11/01/23  3:44 AM   Result Value Ref Range    Lipase 62 11 - 82 u/L   Fingerstick Glucose (POCT)    Collection Time: 11/01/23  3:51 AM   Result Value Ref Range    POC Glucose 212 (H) 65 - 140 mg/dl   HS Troponin I 2hr    Collection Time: 11/01/23  5:35 AM   Result Value Ref Range    hs TnI 2hr 5 "Refer to ACS Flowchart"- see link ng/L    Delta 2hr hsTnI 1 <20 ng/L   Echo complete w/ contrast if indicated    Collection Time: 11/01/23  3:22 PM   Result Value Ref Range    A4C EF 59 %    LVOT stroke volume 87.62     LVOT stroke volume index 40.70 ml/m2    LVOT Cardiac Index 2.11 l/min/m2 LVOT Cardiac Output 4.66 l/min    LVIDd 4.50 cm    LVIDS 3.30 cm    IVSd 1.60 cm    LVPWd 1.60 cm    FS 27 28 - 44    E/A ratio 0.80     E wave deceleration time 229 ms    MV Peak E Scottie 90 cm/s    MV Peak A Scottie 1.12 m/s    AV LVOT peak gradient 6 mmHg    LVOT peak VTI 30.92 cm    LVOT peak scottie 1.22 m/s    RVID d 3.7 cm    Tricuspid annular plane systolic excursion 4.05 cm    LA size 3.9 cm    NAMITA A4C 23.4 cm2    RAA A4C 16.5 cm2    LVOT diameter 1.9 cm    Aortic valve peak velocity 1.46 m/s    Ao VTI 34.95 cm    AV mean gradient 4 mmHg    LVOT mn grad 3.0 mmHg    AV peak gradient 9 mmHg    AV area by cont VTI 2.5 cm2    AV area peak scottie 2.4 cm2    MV stenosis pressure 1/2 time 66 ms    MV valve area p 1/2 method 3.33     TR Peak Scottie 2.1 m/s    Triscuspid Valve Regurgitation Peak Gradient 18.0 mmHg    Aortic valve mean velocity 9.80 m/s    Tricuspid valve peak regurgitation velocity 2.11 m/s    Left ventricular stroke volume (2D) 50.00 mL    IVS 1.6 cm    LEFT VENTRICLE SYSTOLIC VOLUME (MOD BIPLANE) 2D 44 mL    LV DIASTOLIC VOLUME (MOD BIPLANE) 2D 94 mL    Left Atrium Area-systolic Four Chamber 70.2 cm2    LVSV, 2D 50 mL    LVOT area 2.83 cm2    DVI 0.84     AV valve area 2.51 cm2    LV EF 60            Review of Systems   Constitutional:  Positive for fatigue. Negative for appetite change, chills, fever and unexpected weight change. HENT:  Negative for congestion, ear pain, rhinorrhea, sinus pain, sore throat and trouble swallowing.         + chronic nasal congestion. He has been using PRN Coricidan    Eyes:  Negative for visual disturbance. S/p cataract surgery OS   Respiratory:  Positive for apnea. Negative for cough, shortness of breath and wheezing. FIOR/CPAP usage. FIOR on CPAP with auto adjustment feature.   He is currently benefiting from his current use of CPAP with restorative sleep and no excessive daytime tiredness   COPD stable on Advair once a day   Cardiovascular:  Negative for chest pain, palpitations and leg swelling. See HPI. PAD with intermittent leg claudications. no rest pain. pain left hip and calf pain after walking long distances. 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 11/2016 arterial dopplers LEs. Diffuse heterogeneous plaque throughout both legs. Evidence of right lower extremity occlusive disease in mid SFA. aortoiliac study normal but limited views of proximal common iliac arteries due to body habitus admission 08/2015 for right-sided chest pain associated with increased shortness of breath and accelerated hypertension. He ruled out for MI. EKG no acute EKG changes. nuclear stress test normal. no perfusion abnormalities. EF 67%. Gastrointestinal:  Negative for abdominal pain, blood in stool, constipation, diarrhea, nausea and vomiting. GERD/Kessler's  stable on Omeprazole. No dysphagia. Repeat EGD 06/2023. 02/2020 CT scan abdomen and pelvis 3.8 cm exophytic left renal intra pole cyst.  Atherosclerotic changes of abdominal aorta no aneurysm. Enlarged prostate. Small fat containing umbilical hernia. Small fat containing inguinal hernias. Well-differentiated neuroendocrine tumor low-grade. Fatty liver 07/2023 LFTs normal  01/2018 abdominal ultrasound normal except for mild diffuse fatty infiltration of liver. History of colon polyps- Colonoscopy 09/2022. Endocrine: Negative for polydipsia and polyuria. Genitourinary:  Negative for difficulty urinating. Lab Results       Component                Value               Date                       PSA                      1.5                 09/22/2020                 PSA                      0.7                 02/27/2015                 PSA                      0.7                 03/18/2014                      Musculoskeletal:  Negative for arthralgias and myalgias. Skin:  Negative for rash.         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 Neurological:  Negative for dizziness, weakness and headaches. Hematological:  Negative for adenopathy. Does not bruise/bleed easily. 07/2023 CBC microcytic anemia  No rectal bleeding or melena. GERD on Omeprazole 40 mg twice daily. EGD 6/2023 gastritis/gastric polyps. Hiatal hernia 3 cm  9/2022 colonoscopy normal except for diverticulosis.  Now on oral iron        Lab Results       Component                Value               Date                       WBC                      9.83                11/01/2023                 HGB                      11.9 (L)            11/01/2023                 HCT                      37.1                11/01/2023                 MCV                      81 (L)              11/01/2023                 PLT                      160                 11/01/2023              Lab Results       Component                Value               Date                       IRON                     83                  08/31/2023                 TIBC                     403                 08/31/2023                 FERRITIN                 12 (L)              08/31/2023            Lab Results       Component                Value               Date                       [id number]               895                 07/13/2023            Lab Results       Component                Value               Date                       FOLATE                   >22.3               07/13/2023                 Lab Results       Component                Value               Date                       RETICCTPCT               1.88 (H)            07/13/2023            Lab Results       Component                Value               Date                       SPEP                no monoclonal bands         07/13/2023                    Hemoglobin       Date                     Value               Ref Range           Status 2023               11.1 (L)            12.0 - 17.0 g/*     Final                 2022               12.7                12.0 - 17.0 g/*     Final                 2022               13.0                12.0 - 17.0 g/*     Final                 2015               15.3                12.0 - 17.0 g/*     Final                 2015               16.4                12.0 - 17.0 g/*     Final                 2015               15.3                12.0 - 17.0 g/*     Final                 Psychiatric/Behavioral:  Negative for dysphoric mood and sleep disturbance.         Past Medical History:   Diagnosis Date    Accelerated essential hypertension     LAST ASSESSED 8/27/15    Benign positional vertigo 2016    Cataract, left eye 10/24/2017    Diverticulosis     Lumbar radiculopathy 2016    Polyp of colon 2017    Overview:  Added automatically from request for surgery [id number]    Prostatitis     LAST ASSESSED 3/17/15     Past Surgical History:   Procedure Laterality Date    CARDIOVASCULAR STRESS TEST       Family History   Problem Relation Age of Onset    Emphysema Father         LUNG    Stroke Father      Social History     Socioeconomic History    Marital status: /Civil Union     Spouse name: None    Number of children: None    Years of education: None    Highest education level: None   Occupational History    None   Tobacco Use    Smoking status: Former     Packs/day: 2.00     Years: 30.00     Total pack years: 60.00     Types: Cigarettes     Quit date:      Years since quittin.8    Smokeless tobacco: Never   Vaping Use    Vaping Use: Never used   Substance and Sexual Activity    Alcohol use: No    Drug use: No    Sexual activity: None   Other Topics Concern    None   Social History Narrative    None     Social Determinants of Health     Financial Resource Strain: Low Risk  (2023)    Overall Financial Resource Strain (CARDIA)     Difficulty of Paying Living Expenses: Not very hard   Food Insecurity: Not on file   Transportation Needs: No Transportation Needs (7/6/2023)    PRAPARE - Transportation     Lack of Transportation (Medical): No     Lack of Transportation (Non-Medical): No   Physical Activity: Not on file   Stress: Not on file   Social Connections: Not on file   Intimate Partner Violence: Not on file   Housing Stability: Not on file     Current Outpatient Medications on File Prior to Visit   Medication Sig    acetaminophen (TYLENOL) 325 mg tablet Take 3 tablets (975 mg total) by mouth every 8 (eight) hours    amLODIPine (NORVASC) 10 mg tablet Take 1 tablet (10 mg total) by mouth daily    aspirin (ECOTRIN LOW STRENGTH) 81 mg EC tablet Take 81 mg by mouth daily    atorvastatin (LIPITOR) 20 mg tablet Take 1 tablet (20 mg total) by mouth daily    carvedilol (COREG) 25 mg tablet Take 1 tablet (25 mg total) by mouth 2 (two) times a day    chlorthalidone 25 mg tablet Take 1.5 tablets (37.5 mg total) by mouth daily    Cholecalciferol (VITAMIN D3) 1000 UNIT/SPRAY LIQD Take by mouth    Cyanocobalamin (VITAMIN B-12) 1000 MCG/15ML LIQD Take 1 tablet by mouth daily    Fluticasone-Salmeterol (Advair Diskus) 250-50 mcg/dose inhaler Inhale 1 puff 2 (two) times a day    Folic Acid 20 MG CAPS Take 1 capsule by mouth daily    gabapentin (NEURONTIN) 100 mg capsule Take 1 capsule (100 mg total) by mouth 3 (three) times a day    lidocaine (LIDODERM) 5 % Apply 2 patches topically over 12 hours daily Remove & Discard patch within 12 hours or as directed by MD Do not start before November 2, 2023.     metFORMIN (GLUCOPHAGE) 850 mg tablet Take 1 tablet (850 mg total) by mouth 2 (two) times a day with meals    methocarbamol (ROBAXIN) 500 mg tablet Take 1 tablet (500 mg total) by mouth every 6 (six) hours    Omega-3 Fatty Acids (FISH OIL) 1,000 mg Take 1 capsule by mouth daily    omeprazole (PriLOSEC) 40 MG capsule Take 1 capsule by mouth 2 (two) times a day    polyethylene glycol-propylene glycol (SYSTANE) 0.4-0.3 % Apply to eye    triamcinolone (KENALOG) 0.1 % cream Apply topically 2 (two) times a day    valsartan (DIOVAN) 320 MG tablet Take 1 tablet (320 mg total) by mouth daily     No Known Allergies  Immunization History   Administered Date(s) Administered    COVID-19 PFIZER VACCINE 0.3 ML IM 02/19/2021, 03/11/2021, 10/19/2021    COVID-19 Pfizer Vac BIVALENT Roger-sucrose 12 Yr+ IM 10/08/2022    H1N1, All Formulations 02/09/2010    INFLUENZA 11/05/2019    Influenza Split High Dose Preservative Free IM 09/23/2013, 10/01/2014, 11/15/2016, 10/24/2017, 11/05/2019    Influenza, high dose seasonal 0.7 mL 11/19/2018, 11/05/2019, 08/24/2020, 09/22/2021, 11/03/2023    Influenza, seasonal, injectable 09/06/2011, 09/11/2012    Pneumococcal Conjugate 13-Valent 10/12/2015    Pneumococcal Polysaccharide PPV23 09/06/2011, 11/15/2016       Objective     /90 (BP Location: Left arm, Patient Position: Sitting, Cuff Size: Large)   Pulse (!) 53   Temp 97.6 °F (36.4 °C)   Resp 20   Ht 5' 8.5" (1.74 m)   Wt 108 kg (238 lb)   SpO2 98%   BMI 35.66 kg/m²     BP Readings from Last 3 Encounters:   11/03/23 162/90   11/01/23 (!) 172/78   07/13/23 130/68      Wt Readings from Last 3 Encounters:   11/03/23 108 kg (238 lb)   11/01/23 107 kg (236 lb)   07/13/23 107 kg (236 lb 8 oz)          Physical Exam  Vitals and nursing note reviewed. Constitutional:       General: He is not in acute distress. HENT:      Head: Atraumatic. Right Ear: Tympanic membrane and ear canal normal.      Left Ear: Tympanic membrane and ear canal normal.   Eyes:      General: No scleral icterus. Extraocular Movements: Extraocular movements intact. Conjunctiva/sclera: Conjunctivae normal.      Pupils: Pupils are equal, round, and reactive to light. Neck:      Thyroid: No thyroid mass or thyromegaly. Vascular: No carotid bruit or JVD. Trachea: No tracheal deviation.    Cardiovascular:      Rate and Rhythm: Normal rate and regular rhythm. Pulses:           Carotid pulses are 2+ on the right side and 2+ on the left side. Heart sounds: Murmur heard. Systolic murmur is present with a grade of 1/6. No gallop. Pulmonary:      Effort: Pulmonary effort is normal. No respiratory distress. Breath sounds: Normal breath sounds. No wheezing or rales. Musculoskeletal:      Thoracic back: No tenderness or bony tenderness. Right lower leg: No edema. Left lower leg: No edema. Lymphadenopathy:      Cervical: No cervical adenopathy. Upper Body:      Right upper body: No supraclavicular adenopathy. Left upper body: No supraclavicular adenopathy. Skin:     Findings: No rash. Nails: There is no clubbing. Neurological:      General: No focal deficit present. Mental Status: He is alert and oriented to person, place, and time. Motor: No weakness. Gait: Gait normal.      Deep Tendon Reflexes:      Reflex Scores:       Patellar reflexes are 1+ on the right side and 1+ on the left side. Achilles reflexes are 0 on the right side and 0 on the left side.      Comments: No ankle clonus    Psychiatric:         Mood and Affect: Mood normal.       Jacqueline Rivera MD

## 2023-11-13 ENCOUNTER — APPOINTMENT (OUTPATIENT)
Dept: RADIOLOGY | Facility: MEDICAL CENTER | Age: 78
End: 2023-11-13
Payer: COMMERCIAL

## 2023-11-13 DIAGNOSIS — S22.068A OTHER CLOSED FRACTURE OF EIGHTH THORACIC VERTEBRA, INITIAL ENCOUNTER (HCC): ICD-10-CM

## 2023-11-13 PROCEDURE — 72070 X-RAY EXAM THORAC SPINE 2VWS: CPT

## 2023-11-15 ENCOUNTER — OFFICE VISIT (OUTPATIENT)
Dept: NEUROSURGERY | Facility: CLINIC | Age: 78
End: 2023-11-15
Payer: COMMERCIAL

## 2023-11-15 VITALS
SYSTOLIC BLOOD PRESSURE: 126 MMHG | TEMPERATURE: 97.9 F | HEART RATE: 63 BPM | HEIGHT: 69 IN | WEIGHT: 238 LBS | OXYGEN SATURATION: 96 % | RESPIRATION RATE: 16 BRPM | BODY MASS INDEX: 35.25 KG/M2 | DIASTOLIC BLOOD PRESSURE: 60 MMHG

## 2023-11-15 DIAGNOSIS — R55 SYNCOPE AND COLLAPSE: ICD-10-CM

## 2023-11-15 DIAGNOSIS — S22.069D CLOSED FRACTURE OF EIGHTH THORACIC VERTEBRA WITH ROUTINE HEALING, UNSPECIFIED FRACTURE MORPHOLOGY, SUBSEQUENT ENCOUNTER: Primary | ICD-10-CM

## 2023-11-15 PROCEDURE — 99214 OFFICE O/P EST MOD 30 MIN: CPT | Performed by: PHYSICIAN ASSISTANT

## 2023-11-15 RX ORDER — DIPHENHYDRAMINE HYDROCHLORIDE 25 MG/1
25 CAPSULE ORAL DAILY
COMMUNITY

## 2023-11-15 NOTE — ASSESSMENT & PLAN NOTE
Follow-up with family doctor  Planning for carotid ultrasounds as well as amount of thyroid secondary to finding of nodules  Consider CTA if carotid ultrasounds without significant finding.

## 2023-11-15 NOTE — PROGRESS NOTES
Neurosurgery Office Note  Mary Anne Goldsmith. 70 y.o. male MRN: [id number]      Assessment/Plan     Closed T8 spinal fracture [facility])  Patient presents today for 2-week hospital follow-up of T8 fracture  Patient sustained his third syncopal episode when tilting his head back on 10/29/23  He presented to the hospital a few days later secondary to ongoing abdominal pain. Imaging:   CTA chest 11/1/23: Acute fracture through the calcified anterior longitudinal ligament at T7-8 with continuous fracture oblique through the T8 vertebral body. Facet joints appear intact. Thoracic XR 11/13/23: Fracture line not well appreciated. Alignment acceptable and stable. Plan:   Continue to monitor for any logical symptoms including thoracic radiculopathy as well as myelopathy. Signs symptoms reviewed. X-ray imaging as well as prior CT scan and in detail with the patient and his wife. Given stable x-rays and improving pain, we will continue conservative management. Patient is continue to wear TLSO brace when out of bed. He states this causes some comfort in his abdomen and had requested time of taking this off when sitting back in a chair. We discussed infrequent rests. Patient inquiring about increasing activity. Encouraged increasing ambulation but if walking outside ensure sneakers flat surface and take all fall precautions. Continue to avoid bending twisting and heavy lifting  Pain control as needed with over-the-counter visions  We will plan outpatient follow-up in 4 weeks with repeat upright x-rays. Pain where healing process can take 6 to 12 weeks and timing of brace wean will be determined by symptoms as well as serial imaging. He is aware to call the office with any needs or concerns in the interim.        Diagnoses and all orders for this visit:    Closed fracture of eighth thoracic vertebra with routine healing, unspecified fracture morphology, subsequent encounter  -     X-ray thoracic spine 2 views; Future  -     DXA body comp analysis; Future    Other orders  -     Pyridoxine HCl (vitamin B-6) 25 MG tablet; Take 25 mg by mouth daily          I have spent a total time of 35 minutes on 11/15/23 in caring for this patient including Diagnostic results, Instructions for management, Patient and family education, Impressions, Documenting in the medical record, Reviewing / ordering tests, medicine, procedures  , and Obtaining or reviewing history  . CHIEF COMPLAINT    Chief Complaint   Patient presents with    Follow-up       HISTORY    History of Present Illness     70y.o. year old male     This is a 68-year-old male with past medical history significant for hypertension, BPPV and syncopal episodes who presents today for a 2-week hospital follow-up T8 fracture. Patient has had 3 episodes of Gilson with neck extension. He experienced a syncopal episode on October 29 and will up significant pain in the back but more focal in the abdomen. He tried to control symptoms at home with over-the-counter medications but given persistency of symptoms he presented to the hospital.  CTA chest demonstrated a fracture through the calcified anterior longitudinal ligament at C7-8 extending obliquely through the T8 vertebral body. That joints appear intact. Patient was managed conservatively with a LSO brace and serial x-rays which demonstrated stable alignment during his hospitalization. He was ultimately discharged home and presents today after completion of upright brace. Today, he denies any back pain though has mild discomfort. He does continue with abdominal pain without clear radiation or continuity towards his back. He states this is worse when transitioning from sitting to standing. Denies any radiating leg pain, scum tingling and/or weakness except baseline known neuropathy. Denies difficulty with ambulation. Denies bowel bladder dysfunction.         See Discussion    REVIEW OF SYSTEMS    Review of Systems Eyes: Negative. Gastrointestinal:  Positive for abdominal pain (brace makes it worse). Endocrine: Negative. Genitourinary: Negative. Musculoskeletal:  Positive for back pain (discomfort). Wearing brace   Skin: Negative. Allergic/Immunologic: Negative. Neurological: Negative. Hx of neuropathy   Hematological: Negative. Psychiatric/Behavioral: Negative. ROS obtained by MA. Reviewed. See HPI.      Meds/Allergies     Current Outpatient Medications   Medication Sig Dispense Refill    acetaminophen (TYLENOL) 325 mg tablet Take 3 tablets (975 mg total) by mouth every 8 (eight) hours  0    amLODIPine (NORVASC) 10 mg tablet Take 1 tablet (10 mg total) by mouth daily 90 tablet 3    aspirin (ECOTRIN LOW STRENGTH) 81 mg EC tablet Take 81 mg by mouth daily      atorvastatin (LIPITOR) 20 mg tablet Take 1 tablet (20 mg total) by mouth daily 90 tablet 1    carvedilol (COREG) 25 mg tablet Take 1 tablet (25 mg total) by mouth 2 (two) times a day 180 tablet 3    chlorthalidone 25 mg tablet Take 1.5 tablets (37.5 mg total) by mouth daily 135 tablet 3    Cholecalciferol (VITAMIN D3) 1000 UNIT/SPRAY LIQD Take by mouth      Cyanocobalamin (VITAMIN B-12) 1000 MCG/15ML LIQD Take 1 tablet by mouth daily      Fluticasone-Salmeterol (Advair Diskus) 250-50 mcg/dose inhaler Inhale 1 puff 2 (two) times a day 473 blister 3    Folic Acid 20 MG CAPS Take 1 capsule by mouth daily      gabapentin (NEURONTIN) 100 mg capsule Take 1 capsule (100 mg total) by mouth 3 (three) times a day 60 capsule 1    lidocaine (LIDODERM) 5 % Apply 2 patches topically over 12 hours daily Remove & Discard patch within 12 hours or as directed by MD Do not start before November 2, 2023. 30 patch 1    metFORMIN (GLUCOPHAGE) 850 mg tablet Take 1 tablet (850 mg total) by mouth 2 (two) times a day with meals 180 tablet 3    methocarbamol (ROBAXIN) 500 mg tablet Take 1 tablet (500 mg total) by mouth every 6 (six) hours 60 tablet 1 Omega-3 Fatty Acids (FISH OIL) 1,000 mg Take 1 capsule by mouth daily      omeprazole (PriLOSEC) 40 MG capsule Take 1 capsule by mouth 2 (two) times a day      polyethylene glycol-propylene glycol (SYSTANE) 0.4-0.3 % Apply to eye      Pyridoxine HCl (vitamin B-6) 25 MG tablet Take 25 mg by mouth daily      triamcinolone (KENALOG) 0.1 % cream Apply topically 2 (two) times a day 80 g 3    valsartan (DIOVAN) 320 MG tablet Take 1 tablet (320 mg total) by mouth daily 90 tablet 3     No current facility-administered medications for this visit. No Known Allergies    PAST HISTORY    Past Medical History:   Diagnosis Date    Accelerated essential hypertension     LAST ASSESSED 8/27/15    Benign positional vertigo 2016    Cataract, left eye 10/24/2017    Diverticulosis     Lumbar radiculopathy 2016    Polyp of colon 2017    Overview:  Added automatically from request for surgery [id number]    Prostatitis     LAST ASSESSED 3/17/15       Past Surgical History:   Procedure Laterality Date    CARDIOVASCULAR STRESS TEST         Social History     Tobacco Use    Smoking status: Former     Packs/day: 2.00     Years: 30.00     Total pack years: 60.00     Types: Cigarettes     Quit date: 2000     Years since quittin.8    Smokeless tobacco: Never   Vaping Use    Vaping Use: Never used   Substance Use Topics    Alcohol use: No    Drug use: No       Family History   Problem Relation Age of Onset    Emphysema Father         LUNG    Stroke Father          Above history personally reviewed. EXAM    Vitals:Blood pressure 126/60, pulse 63, temperature 97.9 °F (36.6 °C), temperature source Temporal, resp. rate 16, height 5' 8.5" (1.74 m), weight 108 kg (238 lb), SpO2 96 %. ,Body mass index is 35.66 kg/m². Physical Exam  Constitutional:       General: He is not in acute distress. Appearance: Normal appearance. He is well-developed. He is not ill-appearing. HENT:      Head: Normocephalic and atraumatic. Right Ear: External ear normal.      Left Ear: External ear normal.      Nose: Nose normal.      Mouth/Throat:      Mouth: Mucous membranes are moist.   Eyes:      General: No scleral icterus. Right eye: No discharge. Left eye: No discharge. Conjunctiva/sclera: Conjunctivae normal.   Cardiovascular:      Rate and Rhythm: Normal rate. Pulmonary:      Effort: Pulmonary effort is normal. No respiratory distress. Abdominal:      Tenderness: There is no abdominal tenderness. Comments: Intact sensation LT   Musculoskeletal:         General: No tenderness. Cervical back: Normal range of motion and neck supple. Skin:     General: Skin is warm and dry. Neurological:      Mental Status: He is alert. Motor: Motor strength is normal.  Psychiatric:         Mood and Affect: Mood normal.         Speech: Speech normal.         Behavior: Behavior normal.         Thought Content: Thought content normal.         Judgment: Judgment normal.         Neurologic Exam     Mental Status   Follows 3 step commands. Attention: normal. Concentration: normal.   Speech: speech is normal   Level of consciousness: alert  Knowledge: good. Normal comprehension. Cranial Nerves     CN III, IV, VI   Conjugate gaze: present    CN VII   Facial expression full, symmetric. CN VIII   Hearing: intact    Motor Exam   Muscle bulk: normal  Overall muscle tone: normal    Strength   Strength 5/5 throughout. Sensory Exam   Light touch normal.     Gait, Coordination, and Reflexes     Tremor   Resting tremor: absent  Intention tremor: absent  Action tremor: absent    Reflexes   Right Bishop: absent  Left Bishop: absent  Right ankle clonus: absent  Left ankle clonus: absent        MEDICAL DECISION MAKING    Imaging Studies:     XR spine thoracic 2 vw    Result Date: 11/13/2023  Narrative: THORACIC SPINE INDICATION:   Other fracture of T7-t8 thoracic vertebra, initial encounter for closed fracture.  COMPARISON: Comparison made with previous examination(s) dated (DX) 01-Nov-2023,(CT) [id number],(FG) 27-Feb-2020. VIEWS:  XR SPINE THORACIC 2 VW Images: 2 FINDINGS: Previously noted T7 and T8 vertebral body fractures are better appreciated on prior dedicated CT without new loss of vertebral body height or other finding. Thoracic vertebral alignment is within normal limits. No significant degenerative changes. There is no displacement of the paraspinal line. The pedicles appear intact. Impression: Stable appearance previously noted T7 and T8 fractures better characterized on prior CT. Electronically signed: 11/13/2023 02:04 PM Sheron Oro, MPH,MD    XR spine thoracic 3 vw    Result Date: 11/1/2023  Narrative: THORACIC SPINE INDICATION:   repeat uprights with TLSO brace. COMPARISON: 11/1/2023 VIEWS:  XR SPINE THORACIC 3 VW FINDINGS: There is no fracture or pathologic bone lesion. Thoracic vertebral alignment is within normal limits. Grossly stable appearance of the T7 and T8 vertebral bodies with fractures seen to better advantage on prior CT. There is no displacement of the paraspinal line. The pedicles appear intact. Impression: Grossly stable appearance of the T7 and T8 vertebral bodies with fracture seen to better advantage on CT. No interval vertebral body loss of height. Workstation performed: [id number]     XR spine thoracic 3 vw    Result Date: 11/1/2023  Narrative: THORACIC SPINE INDICATION:   T7-8 fracture. COMPARISON: Same day CT chest abdomen pelvis VIEWS:  XR SPINE THORACIC 3 VW FINDINGS: Known T7 and T8 vertebral body fractures are better evaluated on same day CT of the chest abdomen and pelvis. No new fractures are identified. Thoracic vertebral alignment is within normal limits. Mild degenerative changes of the thoracic spine. There is no displacement of the paraspinal line. The pedicles appear intact.      Impression: Known T7 and T8 vertebral body fractures are better evaluated on same day CT of the chest abdomen and pelvis. Workstation performed: [id number]     CTA dissection protocol chest abdomen pelvis w wo contrast    Result Date: 11/1/2023  Narrative: CTA - CHEST, ABDOMEN AND PELVIS - WITHOUT AND WITH IV CONTRAST INDICATION:   Syncope, severe abdominal pain shooting through to back. COMPARISON: CT chest 8/2/2015 and CT abdomen and pelvis 2/27/2020 TECHNIQUE: CT examination of the chest, abdomen and pelvis was performed both prior to and after the administration of intravenous contrast.  The noncontrast portion of this examination was performed utilizing low radiation dose technique. Thin section  angiographic arterial phase post contrast technique was used in order to evaluate for aortic dissection. 3D reformatted images and volume rendering were performed on an independent workstation. Additionally, axial, sagittal, and coronal 2D reformatted  images were created from the source data and submitted for interpretation. Radiation dose length product (DLP) for this visit:  455 3072 mGy-cm . This examination, like all CT scans performed in the [facility], was performed utilizing techniques to minimize radiation dose exposure, including the use of iterative reconstruction and automated exposure control. IV Contrast:  100 mL of iohexol (OMNIPAQUE) 350 Enteric Contrast:  Enteric contrast was not administered. FINDINGS: AORTA:  There is no aortic dissection or intramural hematoma. There is no intrathoracic aortic aneurysm. Stable infrarenal aortic ectasia maximum diameter 2.1 cm. Atherosclerotic disease of the aorta and major aortic branches. No aortic or major branch vessel occlusion. Multifocal moderate to severe stenosis of the left common iliac artery, stable. No other significant stenosis. CHEST LUNGS: Trace bilateral lower lobe dependent basilar subsegmental atelectasis. Minimal bilateral lower lobe juxtapleural scarring. Mild COPD. No suspicious pulmonary nodule.  Multiple punctate posterior predominant calcifications in the lower lobes attributed to diffuse pulmonary ossification. Few scattered calcified granulomata. Central airways are clear. PLEURA: Trace bilateral pleural effusions. Minimal bilateral medial basilar pleural thickening likely scar. HEART/PULMONARY ARTERIAL TREE: Heart is not enlarged. No pericardial effusion. Aortic and coronary artery calcification. Within the limitations of this examination there is no evidence of pulmonary embolus. MEDIASTINUM AND JESUS:  Unremarkable. CHEST WALL AND LOWER NECK:   Multinodular thyroid gland. Trace bilateral gynecomastia, stable. ABDOMEN LIVER/BILIARY TREE:  Unremarkable. GALLBLADDER:  No calcified gallstones. No pericholecystic inflammatory change. SPLEEN:  Unremarkable. PANCREAS:  Unremarkable. ADRENAL GLANDS:  Unremarkable. KIDNEYS/URETERS: One or more sharply circumscribed subcentimeter renal hypodensities are noted. These lesions are too small to accurately characterize, but are statistically most likely to represent benign cortical renal cyst(s). According to the guidelines published in the [facility] Paper of the ACR Incidental Findings Committee (Radiology 2010), no further workup of these lesions is recommended. 3.6 cm left renal exophytic Bosniak 2 cyst, stable. No perinephric collection. STOMACH AND BOWEL:  Unremarkable. APPENDIX: A normal appendix was visualized. ABDOMINOPELVIC CAVITY:  No ascites or free intraperitoneal air. No lymphadenopathy. PELVIS REPRODUCTIVE ORGANS: The prostate is enlarged. URINARY BLADDER:  Unremarkable. ABDOMINAL WALL/INGUINAL REGIONS: Small fat-containing umbilical hernia. Small bilateral inguinal fat-containing hernias, left larger than right. OSSEOUS STRUCTURES: Acute fracture across previously continuous solid anterior paravertebral ossification at the level of T7-8 with contiguous fracture of the anterior superior T8 vertebral body with bilateral posterior inferior fracture extension.  No significant displacement. Trace anterior paravertebral fat stranding at the level of the fracture. Multilevel thoracolumbar paravertebral ossification in a pattern suggestive of diffuse idiopathic skeletal hyperostosis. Mild levoconvex lumbar scoliosis. No osseous destructive lesion identified. Degenerative changes of the spine, pubic symphysis, and multiple joints. Impression: CTA chest: Acute fracture of anterior paravertebral ossification at the level of T7-8 with contiguous fracture extension into the T8 vertebral body. No significant displacement, vertebral body height loss or retropulsion. Trace bilateral pleural effusions with minimal adjacent subsegmental atelectasis. No aortic dissection or aneurysm. No aortic or major branch vessel occlusion or significant stenosis. Incidental thyroid nodule(s) for which nonemergent thyroid ultrasound is recommended. Additional chronic findings and negatives as above. CTA abdomen and pelvis: No aortic dissection. Stable minimal distal infrarenal aortic ectasia. Stable chronic multifocal moderate to severe left common iliac artery stenosis. No aortic or major branch vessel occlusion. Additional chronic findings and negatives as above. The study was marked in [facility] for immediate notification. Workstation performed: [id number]         I have personally reviewed pertinent reports.    and I have personally reviewed pertinent films in PACS

## 2023-11-15 NOTE — ASSESSMENT & PLAN NOTE
Patient presents today for 2-week hospital follow-up of T8 fracture  Patient sustained his third syncopal episode when tilting his head back on 10/29/23  He presented to the hospital a few days later secondary to ongoing abdominal pain. Imaging:   CTA chest 11/1/23: Acute fracture through the calcified anterior longitudinal ligament at T7-8 with continuous fracture oblique through the T8 vertebral body. Facet joints appear intact. Thoracic XR 11/13/23: Fracture line not well appreciated. Alignment acceptable and stable. Plan:   Continue to monitor for any logical symptoms including thoracic radiculopathy as well as myelopathy. Signs symptoms reviewed. X-ray imaging as well as prior CT scan and in detail with the patient and his wife. Given stable x-rays and improving pain, we will continue conservative management. Patient is continue to wear TLSO brace when out of bed. He states this causes some comfort in his abdomen and had requested time of taking this off when sitting back in a chair. We discussed infrequent rests. Patient inquiring about increasing activity. Encouraged increasing ambulation but if walking outside ensure sneakers flat surface and take all fall precautions. Continue to avoid bending twisting and heavy lifting  Pain control as needed with over-the-counter visions  We will plan outpatient follow-up in 4 weeks with repeat upright x-rays. Pain where healing process can take 6 to 12 weeks and timing of brace wean will be determined by symptoms as well as serial imaging. He is aware to call the office with any needs or concerns in the interim.

## 2023-11-15 NOTE — PATIENT INSTRUCTIONS
Neurosurgery discharge instructions following spine fracture:     TLSO brace to be worn when out of bed. May place brace on while sitting on edge of bed. May be removed for showering. No bending, twisting or heavy lifting. No strenuous activities. No Driving. Follow-up as scheduled in four weeks with repeat spine x-rays to be completed 2-3 days prior to visit. Prescription has been entered electronically. **Please notify MD immediately if you have increased back, abdominal or leg pain. New numbness, tingling, weakness in your legs and/or bowel/bladder incontinence. Difficulty walking. **

## 2023-12-08 DIAGNOSIS — S22.069A CLOSED FRACTURE OF EIGHTH THORACIC VERTEBRA, UNSPECIFIED FRACTURE MORPHOLOGY, INITIAL ENCOUNTER (HCC): ICD-10-CM

## 2023-12-08 RX ORDER — GABAPENTIN 100 MG/1
100 CAPSULE ORAL 3 TIMES DAILY
Qty: 60 CAPSULE | Refills: 1 | Status: SHIPPED | OUTPATIENT
Start: 2023-12-08

## 2023-12-08 NOTE — TELEPHONE ENCOUNTER
Reason for call:   [x] Refill   [] Prior Auth  [] Other:     Office:   [x] PCP/Provider -   [] Specialty/Provider -     Medication: Gabapentin    Dose/Frequency: 100 mg     Quantity: #60    Pharmacy: [facility]    Does the patient have enough for 3 days? [] Yes   [] No - Send as HP to POD                  Reason for call:   [x] Refill   [] Prior Auth  [] Other:     Office:   [x] PCP/Provider -   [] Specialty/Provider -     Medication: Robaxin    Dose/Frequency: 500 mg     Quantity: #60    Pharmacy: [facility]    Does the patient have enough for 3 days?    [] Yes   [] No - Send as HP to POD

## 2023-12-11 RX ORDER — METHOCARBAMOL 500 MG/1
500 TABLET, FILM COATED ORAL EVERY 6 HOURS SCHEDULED
Qty: 60 TABLET | Refills: 1 | Status: SHIPPED | OUTPATIENT
Start: 2023-12-11

## 2023-12-12 ENCOUNTER — APPOINTMENT (OUTPATIENT)
Dept: RADIOLOGY | Facility: MEDICAL CENTER | Age: 78
End: 2023-12-12
Payer: COMMERCIAL

## 2023-12-12 DIAGNOSIS — S22.069D CLOSED FRACTURE OF EIGHTH THORACIC VERTEBRA WITH ROUTINE HEALING, UNSPECIFIED FRACTURE MORPHOLOGY, SUBSEQUENT ENCOUNTER: ICD-10-CM

## 2023-12-12 PROCEDURE — 72070 X-RAY EXAM THORAC SPINE 2VWS: CPT

## 2023-12-15 ENCOUNTER — OFFICE VISIT (OUTPATIENT)
Dept: NEUROSURGERY | Facility: CLINIC | Age: 78
End: 2023-12-15
Payer: COMMERCIAL

## 2023-12-15 VITALS
SYSTOLIC BLOOD PRESSURE: 140 MMHG | WEIGHT: 238 LBS | BODY MASS INDEX: 36.07 KG/M2 | TEMPERATURE: 97.9 F | HEART RATE: 57 BPM | RESPIRATION RATE: 16 BRPM | HEIGHT: 68 IN | OXYGEN SATURATION: 97 % | DIASTOLIC BLOOD PRESSURE: 62 MMHG

## 2023-12-15 DIAGNOSIS — S22.069D CLOSED FRACTURE OF EIGHTH THORACIC VERTEBRA WITH ROUTINE HEALING, UNSPECIFIED FRACTURE MORPHOLOGY, SUBSEQUENT ENCOUNTER: Primary | ICD-10-CM

## 2023-12-15 PROCEDURE — 99213 OFFICE O/P EST LOW 20 MIN: CPT | Performed by: PHYSICIAN ASSISTANT

## 2023-12-15 NOTE — ASSESSMENT & PLAN NOTE
Patient presents today for 6-week hospital follow-up of T8 fracture  Patient sustained his third syncopal episode when tilting his head back on 10/29/23  He presented to the hospital a few days later secondary to ongoing abdominal pain. Imaging reviewed in detail with patient:   Xray thoracic 12/12/23: Previously noted T7 and T8 fractures are stable and better seen on prior CT without new height loss. Thoracic vertebral alignment is within normal limits. CTA chest 11/1/23: Acute fracture through the calcified anterior longitudinal ligament at T7-8 with continuous fracture oblique through the T8 vertebral body. Facet joints appear intact. Plan:   Continue to monitor for any logical symptoms including thoracic radiculopathy as well as myelopathy. Signs symptoms reviewed. Given stable x-rays and improving pain, we will continue conservative management. Patient to continue to wear TLSO brace when out of bed. He states this causes some comfort in his abdomen and had requested taking this off when sitting back in a chair. We discussed continued infrequent rests. Patient to continue to avoid bending twisting and heavy lifting  Pain control as needed with over-the-counter and prescribed medications as needed. We will plan follow up in 4 weeks with repeat upright x-rays. He is aware to call the office with any needs or concerns in the interim.

## 2023-12-15 NOTE — PROGRESS NOTES
Neurosurgery Office Note  Odette Schilling. 70 y.o. male MRN: [id number]      Assessment/Plan     Closed T8 spinal fracture [facility])  Patient presents today for 6-week hospital follow-up of T8 fracture  Patient sustained his third syncopal episode when tilting his head back on 10/29/23  He presented to the hospital a few days later secondary to ongoing abdominal pain. Imaging reviewed in detail with patient:   Xray thoracic 12/12/23: Previously noted T7 and T8 fractures are stable and better seen on prior CT without new height loss. Thoracic vertebral alignment is within normal limits. CTA chest 11/1/23: Acute fracture through the calcified anterior longitudinal ligament at T7-8 with continuous fracture oblique through the T8 vertebral body. Facet joints appear intact. Plan:   Continue to monitor for any logical symptoms including thoracic radiculopathy as well as myelopathy. Signs symptoms reviewed. Given stable x-rays and improving pain, we will continue conservative management. Patient to continue to wear TLSO brace when out of bed. He states this causes some comfort in his abdomen and had requested taking this off when sitting back in a chair. We discussed continued infrequent rests. Patient to continue to avoid bending twisting and heavy lifting  Pain control as needed with over-the-counter and prescribed medications as needed. We will plan follow up in 4 weeks with repeat upright x-rays. He is aware to call the office with any needs or concerns in the interim.      Diagnoses and all orders for this visit:    Closed fracture of eighth thoracic vertebra with routine healing, unspecified fracture morphology, subsequent encounter  -     XR spine thoracic 3 vw; Future          I have spent a total time of 30 minutes on 12/15/23 in caring for this patient including Diagnostic results, Risks and benefits of tx options, Instructions for management, Patient and family education, Importance of tx compliance, Risk factor reductions, Impressions, Counseling / Coordination of care, Documenting in the medical record, Reviewing / ordering tests, medicine, procedures  , Obtaining or reviewing history  , and Communicating with other healthcare professionals . CHIEF COMPLAINT    Chief Complaint   Patient presents with    Follow-up       HISTORY    History of Present Illness     70y.o. year old male     This is a 32-year-old male with past medical history significant for hypertension, BPPV and syncopal episodes who presents today for a 6-week hospital follow-up T8 fracture. Patient has had 3 episodes of syncope with neck extension. He experienced a syncopal episode on October 29 without significant pain in the back but more focal in the abdomen. He tried to control symptoms at home with over-the-counter medications but given persistency of symptoms he presented to the hospital.  CTA chest demonstrated a fracture through the calcified anterior longitudinal ligament at T7-8 extending obliquely through the T8 vertebral body. The joints appeared intact. Patient has been managed conservatively with an LSO brace and serial x-rays. Today, he denies any back pain though has mild discomfort in his abdomen. He does continue with abdominal pain without clear radiation or continuity towards his back. He states this is worse when transitioning from sitting to standing. Denies any radiating leg pain, numbness, tingling and/or weakness except baseline known neuropathy. Denies difficulty with ambulation. Denies bowel bladder dysfunction. He reports she continues to use his TLSO brace, but at times takes it off when sitting in his recliner. REVIEW OF SYSTEMS    Review of Systems   Eyes: Negative. Gastrointestinal:  Positive for abdominal pain (brace makes it worse). Negative for constipation, diarrhea, nausea and vomiting. Pt reports when he goes to stand up he has abdominal pin    Endocrine: Negative. Genitourinary: Negative. Musculoskeletal:  Positive for back pain (discomfort). Wearing brace most of the day   Skin: Negative. Allergic/Immunologic: Negative. Neurological: Negative. Hematological: Negative. Psychiatric/Behavioral:  Positive for sleep disturbance. Wakes up middle of night from pain that is in back. Can sleep on couch to help with pain. ROS obtained by MA. Reviewed. See HPI.      Meds/Allergies     Current Outpatient Medications   Medication Sig Dispense Refill    acetaminophen (TYLENOL) 325 mg tablet Take 3 tablets (975 mg total) by mouth every 8 (eight) hours  0    amLODIPine (NORVASC) 10 mg tablet Take 1 tablet (10 mg total) by mouth daily 90 tablet 3    aspirin (ECOTRIN LOW STRENGTH) 81 mg EC tablet Take 81 mg by mouth daily      atorvastatin (LIPITOR) 20 mg tablet Take 1 tablet (20 mg total) by mouth daily 90 tablet 1    carvedilol (COREG) 25 mg tablet Take 1 tablet (25 mg total) by mouth 2 (two) times a day 180 tablet 3    chlorthalidone 25 mg tablet Take 1.5 tablets (37.5 mg total) by mouth daily 135 tablet 3    Cholecalciferol (VITAMIN D3) 1000 UNIT/SPRAY LIQD Take by mouth      Cyanocobalamin (VITAMIN B-12) 1000 MCG/15ML LIQD Take 1 tablet by mouth daily      Fluticasone-Salmeterol (Advair Diskus) 250-50 mcg/dose inhaler Inhale 1 puff 2 (two) times a day (Patient taking differently: Inhale 1 puff in the morning) 867 blister 3    Folic Acid 20 MG CAPS Take 1 capsule by mouth daily      gabapentin (NEURONTIN) 100 mg capsule Take 1 capsule (100 mg total) by mouth 3 (three) times a day 60 capsule 1    metFORMIN (GLUCOPHAGE) 850 mg tablet Take 1 tablet (850 mg total) by mouth 2 (two) times a day with meals 180 tablet 3    methocarbamol (ROBAXIN) 500 mg tablet Take 1 tablet (500 mg total) by mouth every 6 (six) hours 60 tablet 1    Omega-3 Fatty Acids (FISH OIL) 1,000 mg Take 1 capsule by mouth daily      omeprazole (PriLOSEC) 40 MG capsule Take 1 capsule by mouth 2 (two) times a day      polyethylene glycol-propylene glycol (SYSTANE) 0.4-0.3 % Apply to eye      Pyridoxine HCl (vitamin B-6) 25 MG tablet Take 25 mg by mouth daily      triamcinolone (KENALOG) 0.1 % cream Apply topically 2 (two) times a day 80 g 3    valsartan (DIOVAN) 320 MG tablet Take 1 tablet (320 mg total) by mouth daily 90 tablet 3    lidocaine (LIDODERM) 5 % Apply 2 patches topically over 12 hours daily Remove & Discard patch within 12 hours or as directed by MD Do not start before 2023. (Patient not taking: Reported on 12/15/2023) 30 patch 1     No current facility-administered medications for this visit. No Known Allergies    PAST HISTORY    Past Medical History:   Diagnosis Date    Accelerated essential hypertension     LAST ASSESSED 8/27/15    Benign positional vertigo 2016    Cataract, left eye 10/24/2017    Diverticulosis     Lumbar radiculopathy 2016    Polyp of colon 2017    Overview:  Added automatically from request for surgery [id number]    Prostatitis     LAST ASSESSED 3/17/15       Past Surgical History:   Procedure Laterality Date    CARDIOVASCULAR STRESS TEST         Social History     Tobacco Use    Smoking status: Former     Current packs/day: 0.00     Average packs/day: 2.0 packs/day for 30.0 years (60.0 ttl pk-yrs)     Types: Cigarettes     Start date: 5     Quit date: 2000     Years since quittin.9    Smokeless tobacco: Never   Vaping Use    Vaping status: Never Used   Substance Use Topics    Alcohol use: No    Drug use: No       Family History   Problem Relation Age of Onset    Emphysema Father         LUNG    Stroke Father          Above history personally reviewed. EXAM    Vitals:Blood pressure 140/62, pulse 57, temperature 97.9 °F (36.6 °C), temperature source Tympanic, resp. rate 16, height 5' 8" (1.727 m), weight 108 kg (238 lb), SpO2 97%. ,Body mass index is 36.19 kg/m².      Physical Exam  Constitutional:       General: He is not in acute distress. Appearance: He is well-developed. HENT:      Head: Normocephalic and atraumatic. Eyes:      General: No scleral icterus. Conjunctiva/sclera: Conjunctivae normal.      Pupils: Pupils are equal, round, and reactive to light. Neck:      Trachea: No tracheal deviation. Cardiovascular:      Rate and Rhythm: Normal rate. Pulmonary:      Effort: Pulmonary effort is normal. No respiratory distress. Abdominal:      Palpations: Abdomen is soft. Tenderness: There is no abdominal tenderness. There is no guarding. Musculoskeletal:      Cervical back: Neck supple. Comments: TLSO brace in place. Skin:     General: Skin is warm and dry. Coloration: Skin is not pale. Findings: No rash. Neurological:      Mental Status: He is alert and oriented to person, place, and time. Comments: GCS 15, Awake, Alert, Oriented x 3    Motor: FLOWERS, strength 5/5 throughout    Sensation:  intact to LT X 4 except slightly decreased in bilateral hands and feet. Reflexes: 2+ and symmetric, no paz's or clonus     Coordination: no drift bilateral upper extremities. Psychiatric:         Behavior: Behavior normal.         Neurologic Exam     Mental Status   Oriented to person, place, and time. Cranial Nerves     CN III, IV, VI   Pupils are equal, round, and reactive to light. MEDICAL DECISION MAKING    Imaging Studies:     X-ray thoracic spine 2 views    Result Date: 12/12/2023  Narrative: THORACIC SPINE INDICATION:   Unspecified fracture of T7-t8 vertebra, subsequent encounter for fracture with routine healing. COMPARISON:  Comparison made with previous examination(s) dated (DX) 13-Nov-2023,(DX) [id number],(QF) 01-Nov-2023,(CT) 27-Feb-2020. VIEWS:  XR SPINE THORACIC 2 VW Images: 4 FINDINGS: Previously noted T7 and T8 fractures are stable and better seen on prior CT without new height loss. Thoracic vertebral alignment is within normal limits.  Age related degenerative changes are seen. There is no displacement of the paraspinal line. The pedicles appear intact. Impression: Stable T7 and T8 vertebral body height in the setting of known fractures better seen on prior dedicated CT. Electronically signed: 12/12/2023 12:35 PM Sumi Monaco, MPH,MD      I have personally reviewed pertinent reports.    and I have personally reviewed pertinent films in PACS

## 2023-12-22 ENCOUNTER — TELEPHONE (OUTPATIENT)
Dept: FAMILY MEDICINE CLINIC | Facility: CLINIC | Age: 78
End: 2023-12-22

## 2023-12-22 NOTE — TELEPHONE ENCOUNTER
----- Message from London Mata MD sent at 12/10/2023  2:15 PM EST -----  Call patient can we call patient to see when his last diabetic eye exam was   Last report was from [facility] Dr Rob Johnson  if available would need his most recent eye exam

## 2023-12-22 NOTE — TELEPHONE ENCOUNTER
Called patient he hasn't gone to [facility]  in a few years. He is going to be seeing Dr Maribell Watson in Jan-Feb 2024 he hasn't set up appt yet.

## 2024-01-01 NOTE — PROGRESS NOTES
Assessment/Plan:         Diagnoses and all orders for this visit:    Type 2 diabetes mellitus with diabetic polyneuropathy, without long-term current use of insulin (HCC)  -     Hemoglobin A1C    Microalbuminuria due to type 2 diabetes mellitus (HCC)  -     Microalbumin / creatinine urine ratio    Mixed hyperlipidemia  -     atorvastatin (LIPITOR) 20 mg tablet; Take 1 tablet (20 mg total) by mouth daily  -     Lipid panel    Essential hypertension  -     Comprehensive metabolic panel  -     CBC and differential    Chronic obstructive pulmonary disease, unspecified COPD type (HCC)    Peripheral arterial disease (HCC)    Class 2 severe obesity with serious comorbidity and body mass index (BMI) of 36 0 to 36 9 in adult, unspecified obesity type (HCC)    Gastroesophageal reflux disease without esophagitis    Kessler's esophagus without dysplasia    Fatty liver          Patient is going to try to increase his Metformin to 3 a day splitting 2 and 1 with food- alternatively he could change to Metformin ER and/ or add Jardiance  He is due for an eye exam  Re Atorvastatin he is going to try 20 mg every other day  Office visit 6 months with repeat labs at that time    BMI Counseling: Body mass index is 36 26 kg/m²  The BMI is above normal  Nutrition recommendations include reducing portion sizes, decreasing overall calorie intake, consuming healthier snacks, moderation in carbohydrate intake, reducing intake of saturated fat and trans fat and reducing intake of cholesterol  Exercise recommendations include exercising 3-5 times per week  PHQ-9 Depression Screening    PHQ-9:   Frequency of the following problems over the past two weeks:      Little interest or pleasure in doing things: 0 - not at all  Feeling down, depressed, or hopeless: 0 - not at all  PHQ-2 Score: 0            Patient ID: Nima Coyle  is a 78 y o  male  CC Visit for chronic medical problem    Follow up visit  Medications reviewed   Type 2 DM ED Provider PROCEDURE Note    Patient : Sheila Metz Age: 2 week old Sex: female   MRN: [id number] Encounter Date: 2024      Procedures   Lumbar Puncture    Date/Time: 2024 11:54 PM    Performed by: Ramírez Colon  Authorized by: Ramírez Colon    Consent:     Consent obtained:  Verbal    Consent given by:  Parent    Risks discussed:  Bleeding, infection, pain, repeat procedure, nerve damage and headache    Alternatives discussed:  No treatment, alternative treatment and delayed treatment  Universal protocol:     Procedure explained and questions answered to patient or proxy's satisfaction: yes      Patient identity confirmed:  Hospital-assigned identification number and arm band  Pre-procedure details:     Procedure purpose:  Diagnostic    Preparation: Patient was prepped and draped in usual sterile fashion    Anesthesia:     Anesthesia method:  None  Procedure details:     Lumbar space:  L3-L4 interspace    Patient position:  L lateral decubitus    Needle gauge:  22    Needle type:  Spinal needle - Quincke tip    Needle length (in):  1    Ultrasound guidance: no      Number of attempts:  2 (Initial attempt to L4/5 space unsuccessful, repeat attempt 1 space higher successful on 1st attempt to this space)    Fluid appearance:  Clear    Tubes of fluid:  4    Total volume (ml):  4  Post-procedure details:     Puncture site:  Adhesive bandage applied and direct pressure applied    Procedure completion:  Tolerated well, no immediate complications    Ramírez Colon MD  Emergency Medicine PGY-3  [facility]  #       Ramírez Colon  Resident  03/25/24 0022       Ramírez Colon  Resident  03/25/24 0024     Metformin 500 mg BID  GI side effects on Metformin 1,000 mg BID   05/2021 A1c 8 6 increased from 8 3   05/2021 urine micro albumin 73 6 decreased from 172  on ARB  No hypoglycemic events  Mild neuropathy symptoms numbness- no pain  Eye exam 08/2018 no diabetic retinopathy  Hypertension blood pressures have been stable on current multi drug regimen  05/2021 creatinine 1 15  Electrolytes normal  Hgb 13 6  Thomas Laquey Mixed hyperlipidemia on 2 fish oil capsules a day  05/2021  Lipid profile cholesterol 108 TGs 278 decreased from 339  HDL 31  LDL 21  LFTs normal  TSH 2 140  02/2020 CT scan abdomen and pelvis ordered by his surgeon for recurrent abdominal pain  Incidental finding significant triple-vessel coronary artery calcification  No pericardial effusion  Scattered sub 3 mm central lobular calcified and noncalcified pulmonary nodules  Atherosclerotic changes of aorta no aneurysm  Patient was started on Atorvastatin 40 mg a day he decreased his dose to 1/2 tablet daily due to side effects-less abdominal pain except after taking 3 consecutive days abdominal pain recurs  Hemoglobin A1C   Date Value Ref Range Status   05/18/2021 8 6 (H) Normal 3 8-5 6%; PreDiabetic 5 7-6 4%; Diabetic >=6 5%; Glycemic control for adults with diabetes <7 0% % Final   03/01/2021 8 3 (H) Normal 3 8-5 6%; PreDiabetic 5 7-6 4%; Diabetic >=6 5%; Glycemic control for adults with diabetes <7 0% % Final   11/25/2020 8 7 (A) 6 5 Final   09/22/2020 10 4 (H) Normal 3 8-5 6%; PreDiabetic 5 7-6 4%;  Diabetic >=6 5%; Glycemic control for adults with diabetes <7 0% % Final   05/10/2019 7 0 (H) <5 7 % Final     Comment:     Reference Range  Non-diabetic                     <5 7  Pre-diabetic                     5 7-6 4  Diabetic                         >=6 5  ADA target for diabetic control  <=7   11/12/2018 6 8 (H) <5 7 % Final     Comment:     Reference Range  Non-diabetic                     <5 7  Pre-diabetic                     5 7-6 4  Diabetic                         >=6 5  ADA target for diabetic control  <=7   04/27/2018 6 5 (H) <5 7 % Final     Comment:     Reference Range  Non-diabetic                     <5 7  Pre-diabetic                     5 7-6 4  Diabetic                         >=6 5  ADA target for diabetic control  <=7         Recent Results (from the past 504 hour(s))   Comprehensive metabolic panel    Collection Time: 05/18/21  8:18 AM   Result Value Ref Range    Sodium 139 136 - 145 mmol/L    Potassium 4 9 3 5 - 5 3 mmol/L    Chloride 104 100 - 108 mmol/L    CO2 28 21 - 32 mmol/L    ANION GAP 7 4 - 13 mmol/L    BUN 24 5 - 25 mg/dL    Creatinine 1 15 0 60 - 1 30 mg/dL    Glucose, Fasting 202 (H) 65 - 99 mg/dL    Calcium 10 0 8 3 - 10 1 mg/dL    AST 28 5 - 45 U/L    ALT 46 12 - 78 U/L    Alkaline Phosphatase 64 46 - 116 U/L    Total Protein 7 6 6 4 - 8 2 g/dL    Albumin 3 8 3 5 - 5 0 g/dL    Total Bilirubin 0 57 0 20 - 1 00 mg/dL    eGFR 62 ml/min/1 73sq m   CBC and differential    Collection Time: 05/18/21  8:18 AM   Result Value Ref Range    WBC 7 31 4 31 - 10 16 Thousand/uL    RBC 4 94 3 88 - 5 62 Million/uL    Hemoglobin 13 6 12 0 - 17 0 g/dL    Hematocrit 41 7 36 5 - 49 3 %    MCV 84 82 - 98 fL    MCH 27 5 26 8 - 34 3 pg    MCHC 32 6 31 4 - 37 4 g/dL    RDW 14 0 11 6 - 15 1 %    MPV 13 0 (H) 8 9 - 12 7 fL    Platelets 095 317 - 490 Thousands/uL    nRBC 0 /100 WBCs    Neutrophils Relative 60 43 - 75 %    Immat GRANS % 1 0 - 2 %    Lymphocytes Relative 26 14 - 44 %    Monocytes Relative 6 4 - 12 %    Eosinophils Relative 6 0 - 6 %    Basophils Relative 1 0 - 1 %    Neutrophils Absolute 4 40 1 85 - 7 62 Thousands/µL    Immature Grans Absolute 0 05 0 00 - 0 20 Thousand/uL    Lymphocytes Absolute 1 87 0 60 - 4 47 Thousands/µL    Monocytes Absolute 0 44 0 17 - 1 22 Thousand/µL    Eosinophils Absolute 0 47 0 00 - 0 61 Thousand/µL    Basophils Absolute 0 08 0 00 - 0 10 Thousands/µL   Hemoglobin A1C    Collection Time: 05/18/21 8:18 AM   Result Value Ref Range    Hemoglobin A1C 8 6 (H) Normal 3 8-5 6%; PreDiabetic 5 7-6 4%; Diabetic >=6 5%; Glycemic control for adults with diabetes <7 0% %     mg/dl   Lipid panel    Collection Time: 05/18/21  8:18 AM   Result Value Ref Range    Cholesterol 108 50 - 200 mg/dL    Triglycerides 278 (H) <=150 mg/dL    HDL, Direct 31 (L) >=40 mg/dL    LDL Calculated 21 0 - 100 mg/dL    Non-HDL-Chol (CHOL-HDL) 77 mg/dl   Microalbumin / creatinine urine ratio    Collection Time: 05/18/21  8:18 AM   Result Value Ref Range    Creatinine, Ur 89 2 mg/dL    Microalbum  ,U,Random 73 6 (H) 0 0 - 20 0 mg/L    Microalb Creat Ratio 83 (H) 0 - 30 mg/g creatinine   TSH, 3rd generation with Free T4 reflex    Collection Time: 05/18/21  8:18 AM   Result Value Ref Range    TSH 3RD GENERATON 2 140 0 358 - 3 740 uIU/mL         The following portions of the patient's history were reviewed and updated as appropriate: allergies, current medications, past family history, past medical history, past social history, past surgical history and problem list     Review of Systems   Constitutional: Positive for unexpected weight change (7 lb weight loss from 11/2020 with dieting )  Negative for appetite change, chills and fever  HENT: Negative for congestion, ear pain, rhinorrhea, sinus pain, sore throat and trouble swallowing          + chronic nasal congestion  Eyes: Negative for visual disturbance  S/p cataract surgery OS   Respiratory: Positive for apnea  Negative for cough, shortness of breath and wheezing  Asthma stable on Advair once a day  FIOR on CPAP with auto adjustment feature  Current mask is no longer working "broken"  Patient is in need of a new CPAP device  He is currently benefiting from his current use of CPAP with restorative sleep and no excessive daytime tiredness   Cardiovascular: Negative for chest pain, palpitations and leg swelling  See HPI  PAD with intermittent leg claudications  no rest pain  pain left hip and calf pain after walking long distances  11/2016 arterial dopplers LEs  Diffuse heterogeneous plaque throughout both legs  Evidence of right lower extremity occlusive disease in mid SFA  aortoiliac study normal but limited views of proximal common iliac arteries due to body habitus admission 08/2015 for right-sided chest pain associated with increased shortness of breath and accelerated hypertension  He ruled out for MI  EKG no acute EKG changes  nuclear stress test normal  no perfusion abnormalities  EF 67%  He was seen by nephrology  workup for secondary causes of hypertension metanephrine normal at 47  normetanephrine elevated at 183  renin 19 27  aldosterone 2 2 renal artery dopplers 03/2015 no ISIAH  kidneys hypertrophic  2 8 cm cyst left kidney  Gastrointestinal: Negative for abdominal pain, blood in stool, constipation, diarrhea, nausea and vomiting  GERD stable on Omeprazole  No dysphagia  EGD 12/2018  HH and Kessler's   03/2020 EGD duodenal polyp  Well-differentiated neuroendocrine tumor low-grade  Status post polypectomy  02/2020 CT scan abdomen and pelvis 3 8 cm exophytic left renal intra pole cyst   Atherosclerotic changes of abdominal aorta no aneurysm  Enlarged prostate  Small fat containing umbilical hernia  Small fat containing inguinal hernias  History of fatty liver 05/2021 LFTs normal   01/2018 abdominal ultrasound normal except for mild diffuse fatty infiltration of liver colonoscopy 06/2014  1 colon polyp descending colon, diverticulosis and internal hemorrhoids       Lab Results       Component                Value               Date                       ALT                      46                  05/18/2021                 AST                      28                  05/18/2021                 ALKPHOS                  64                  05/18/2021                 BILITOT                  0 57                05/18/2021                       AST (U/L)       Date                     Value                 05/26/2020               39                    02/27/2015               53 (H)                09/23/2014               71 (H)                03/18/2014               57 (H)             ALT (U/L)       Date                     Value                 05/26/2020               72                    02/27/2015               87 (H)                09/23/2014               116 (H)               03/18/2014               93 (H)                Endocrine: Negative for polydipsia and polyuria  Genitourinary: Negative for difficulty urinating  Lab Results       Component                Value               Date                       PSA                      1 5                 09/22/2020                 PSA                      0 7                 02/27/2015                 PSA                      0 7                 03/18/2014                      Musculoskeletal: Negative for arthralgias and myalgias  Skin: Negative for rash  Allergic/Immunologic: Negative for environmental allergies  Neurological: Negative for dizziness, weakness, light-headedness and headaches  Hematological: Negative for adenopathy  Does not bruise/bleed easily  Psychiatric/Behavioral: Negative for dysphoric mood and sleep disturbance  Objective:    /60   Pulse 56   Temp 97 8 °F (36 6 °C)   Resp 16   Ht 5' 7 5" (1 715 m)   Wt 107 kg (235 lb)   BMI 36 26 kg/m²     BP Readings from Last 3 Encounters:   05/27/21 128/60   11/25/20 118/64   06/02/20 132/66     Wt Readings from Last 3 Encounters:   05/27/21 107 kg (235 lb)   11/25/20 110 kg (242 lb)   06/02/20 109 kg (241 lb)          Physical Exam  Vitals signs and nursing note reviewed  Constitutional:       General: He is not in acute distress  Appearance: He is well-developed  HENT:      Right Ear: Tympanic membrane normal       Left Ear: Tympanic membrane normal    Eyes:      General: No scleral icterus  Extraocular Movements: Extraocular movements intact  Conjunctiva/sclera: Conjunctivae normal       Pupils: Pupils are equal, round, and reactive to light  Neck:      Thyroid: No thyroid mass or thyromegaly  Vascular: No carotid bruit or JVD  Trachea: No tracheal deviation  Cardiovascular:      Rate and Rhythm: Normal rate and regular rhythm  Pulses:           Carotid pulses are 2+ on the right side and 2+ on the left side  Heart sounds: Normal heart sounds  No murmur  No gallop  Pulmonary:      Effort: Pulmonary effort is normal  No respiratory distress  Breath sounds: Normal breath sounds  No wheezing or rales  Abdominal:      General: Bowel sounds are normal  There is no distension or abdominal bruit  Palpations: Abdomen is soft  There is no hepatomegaly, splenomegaly or mass  Tenderness: There is no abdominal tenderness  There is no guarding or rebound  Musculoskeletal:      Right lower leg: No edema  Left lower leg: No edema  Lymphadenopathy:      Cervical: No cervical adenopathy  Upper Body:      Right upper body: No supraclavicular adenopathy  Left upper body: No supraclavicular adenopathy  Skin:     Findings: No rash  Nails: There is no clubbing  Neurological:      General: No focal deficit present  Mental Status: He is alert and oriented to person, place, and time     Psychiatric:         Mood and Affect: Mood normal

## 2024-01-09 ENCOUNTER — RA CDI HCC (OUTPATIENT)
Dept: OTHER | Facility: HOSPITAL | Age: 79
End: 2024-01-09

## 2024-01-09 NOTE — PROGRESS NOTES
HCC coding opportunities          Chart Reviewed number of suggestions sent to Provider: 1     Patients Insurance     Medicare Insurance: [employer]          E11.51: Type 2 diabetes mellitus with diabetic peripheral angiopathy without gangrene (HCC) [[id number]]     DM & PVD are presumed to have a causal-effect relationship unless documented as unrelated

## 2024-01-10 ENCOUNTER — APPOINTMENT (OUTPATIENT)
Dept: RADIOLOGY | Facility: MEDICAL CENTER | Age: 79
End: 2024-01-10
Payer: COMMERCIAL

## 2024-01-10 DIAGNOSIS — S22.069D CLOSED FRACTURE OF EIGHTH THORACIC VERTEBRA WITH ROUTINE HEALING, UNSPECIFIED FRACTURE MORPHOLOGY, SUBSEQUENT ENCOUNTER: ICD-10-CM

## 2024-01-10 PROCEDURE — 72072 X-RAY EXAM THORAC SPINE 3VWS: CPT

## 2024-01-11 ENCOUNTER — APPOINTMENT (OUTPATIENT)
Dept: LAB | Facility: MEDICAL CENTER | Age: 79
End: 2024-01-11
Payer: COMMERCIAL

## 2024-01-11 DIAGNOSIS — I10 PRIMARY HYPERTENSION: ICD-10-CM

## 2024-01-11 DIAGNOSIS — R80.9 TYPE 2 DIABETES MELLITUS WITH MICROALBUMINURIA, WITHOUT LONG-TERM CURRENT USE OF INSULIN: ICD-10-CM

## 2024-01-11 DIAGNOSIS — E61.1 IRON DEFICIENCY: ICD-10-CM

## 2024-01-11 DIAGNOSIS — E11.29 TYPE 2 DIABETES MELLITUS WITH MICROALBUMINURIA, WITHOUT LONG-TERM CURRENT USE OF INSULIN: ICD-10-CM

## 2024-01-11 LAB
BASOPHILS # BLD AUTO: 0.1 THOUSANDS/ÂΜL (ref 0–0.1)
BASOPHILS NFR BLD AUTO: 2 % (ref 0–1)
EOSINOPHIL # BLD AUTO: 0.5 THOUSAND/ÂΜL (ref 0–0.61)
EOSINOPHIL NFR BLD AUTO: 8 % (ref 0–6)
ERYTHROCYTE [DISTWIDTH] IN BLOOD BY AUTOMATED COUNT: 15.1 % (ref 11.6–15.1)
EST. AVERAGE GLUCOSE BLD GHB EST-MCNC: 166 MG/DL
FERRITIN SERPL-MCNC: 23 NG/ML (ref 24–336)
HBA1C MFR BLD: 7.4 %
HCT VFR BLD AUTO: 38.6 % (ref 36.5–49.3)
HGB BLD-MCNC: 12.6 G/DL (ref 12–17)
IMM GRANULOCYTES # BLD AUTO: 0.04 THOUSAND/UL (ref 0–0.2)
IMM GRANULOCYTES NFR BLD AUTO: 1 % (ref 0–2)
IRON SATN MFR SERPL: 18 % (ref 15–50)
IRON SERPL-MCNC: 69 UG/DL (ref 50–212)
LYMPHOCYTES # BLD AUTO: 1.69 THOUSANDS/ÂΜL (ref 0.6–4.47)
LYMPHOCYTES NFR BLD AUTO: 27 % (ref 14–44)
MCH RBC QN AUTO: 27.8 PG (ref 26.8–34.3)
MCHC RBC AUTO-ENTMCNC: 32.6 G/DL (ref 31.4–37.4)
MCV RBC AUTO: 85 FL (ref 82–98)
MONOCYTES # BLD AUTO: 0.32 THOUSAND/ÂΜL (ref 0.17–1.22)
MONOCYTES NFR BLD AUTO: 5 % (ref 4–12)
NEUTROPHILS # BLD AUTO: 3.69 THOUSANDS/ÂΜL (ref 1.85–7.62)
NEUTS SEG NFR BLD AUTO: 57 % (ref 43–75)
NRBC BLD AUTO-RTO: 0 /100 WBCS
PLATELET # BLD AUTO: 171 THOUSANDS/UL (ref 149–390)
PMV BLD AUTO: 12.1 FL (ref 8.9–12.7)
RBC # BLD AUTO: 4.54 MILLION/UL (ref 3.88–5.62)
TIBC SERPL-MCNC: 380 UG/DL (ref 250–450)
UIBC SERPL-MCNC: 311 UG/DL (ref 155–355)
WBC # BLD AUTO: 6.34 THOUSAND/UL (ref 4.31–10.16)

## 2024-01-11 PROCEDURE — 83540 ASSAY OF IRON: CPT

## 2024-01-11 PROCEDURE — 83036 HEMOGLOBIN GLYCOSYLATED A1C: CPT

## 2024-01-11 PROCEDURE — 36415 COLL VENOUS BLD VENIPUNCTURE: CPT

## 2024-01-11 PROCEDURE — 85025 COMPLETE CBC W/AUTO DIFF WBC: CPT

## 2024-01-11 PROCEDURE — 83550 IRON BINDING TEST: CPT

## 2024-01-11 PROCEDURE — 82728 ASSAY OF FERRITIN: CPT

## 2024-01-12 ENCOUNTER — OFFICE VISIT (OUTPATIENT)
Dept: NEUROSURGERY | Facility: CLINIC | Age: 79
End: 2024-01-12
Payer: COMMERCIAL

## 2024-01-12 VITALS
RESPIRATION RATE: 20 BRPM | DIASTOLIC BLOOD PRESSURE: 74 MMHG | HEART RATE: 68 BPM | WEIGHT: 238 LBS | BODY MASS INDEX: 36.07 KG/M2 | OXYGEN SATURATION: 97 % | HEIGHT: 68 IN | TEMPERATURE: 97.8 F | SYSTOLIC BLOOD PRESSURE: 156 MMHG

## 2024-01-12 DIAGNOSIS — S22.060D CLOSED WEDGE COMPRESSION FRACTURE OF T8 VERTEBRA WITH ROUTINE HEALING, SUBSEQUENT ENCOUNTER: Primary | ICD-10-CM

## 2024-01-12 DIAGNOSIS — E66.01 OBESITY, MORBID (HCC): ICD-10-CM

## 2024-01-12 PROCEDURE — 99213 OFFICE O/P EST LOW 20 MIN: CPT | Performed by: PHYSICIAN ASSISTANT

## 2024-01-12 NOTE — PROGRESS NOTES
Neurosurgery Office Note  Poncho Villalobos Jr. 78 y.o. male MRN: [id number]      Assessment/Plan   Patient is a 78 yrs old gentleman with PMHx of Kessler's Esophagus, Fatty liver, FIOR, HTN, PAD, DM-Neuropathy, Hyperlipidemia COPD and traumatic T8 fracture. Patient with Hx of fall about 8-9 weeks ago after he had syncopal episode. He is wearing TLSO, had follow up Upright Thoracic spine xrays. Images demonstrates more or less stable SEP compression of T8. Patient reports continuous improvement with his thoracic spine back pain, he estimated pain as mild to moderate, worse with movement and certain activities, and mostly in the morning when he gets OOB start walking. Otherwsie, denies radicular pain, numbness, weakness in the Lower extremities. No gait issues or B/B dysfunction.Patient taking Gabapentin, Robaxin, and Tylenol.    Exam-A&OX3. Manolo. Strength 5/5 bilaterally. Sensation to LT intact bilaterally. Sensation to LT intact throughout. DTR 2+, no clonus bilaterally. Tenderness detected on palpation of T8 region. Wearing brace, tandem gait instability.    Hx, PEx, and images reviewed with the patient and his wife, Mx plan discussed. Given ongoing Thoracic back pain, I would recommend one more thoracic spine XR in brace in 3-4 weeks. Advised to continue PT, take pain pills, continue wearing brace. Fall precaution, avoid lifting heavy objects, excessive bending or twisting. All questions and concerns were answered to Patient's satisfaction. Patient expressed his understandings and agreed with the plan.  Plan:   Upright Thoracic spine xrays in brace/3-4 weeks  Continue wearing brace, while performing PT, and assuming upright  Fall precautions, avoid lifting heavy objects, excessive bending or twisting  F/U after images results  Call with questions or concerns    I have spent a total time of 45 minutes on 01/12/24 in caring for this patient including Diagnostic results, Prognosis, Risks and benefits of tx options,  "Instructions for management, Patient and family education, Importance of tx compliance, Risk factor reductions, Impressions, Counseling / Coordination of care, Documenting in the medical record, Reviewing / ordering tests, medicine, procedures  , and Obtaining or reviewing history  .      Chief Complaint   Patient presents with    Follow-up     2 Mo. F/U on T8 Fx- T/S Xray on 1/10/24  S/p Syncopal Episode on Aspirin @ home on 11/1/23       C/C: \" Here for T8 follow up\"    HPI    See Detailed discussion above    REVIEW OF SYSTEMS  ROS personally reviewed and updated as follows:   Review of Systems   Constitutional: Negative.    HENT: Negative.     Eyes: Negative.    Respiratory: Negative.     Cardiovascular: Negative.    Gastrointestinal: Negative.    Endocrine: Negative.    Genitourinary: Negative.    Musculoskeletal: Negative.         2 Mo. F/U on T8 Fx- T/S Xray on 1/10/24  S/p Syncopal Episode on Aspirin @ home on 11/1/23       Intermittent Lbp radiates to his stomach. He denies any leg pain.   Worse with positional changes.   Patient states his pain has improved. He continues to wear TLSO brace.   Pain 5/10    Meds: Gabapentin   Skin: Negative.    Allergic/Immunologic: Negative.    Neurological: Negative.    Hematological: Negative.    Psychiatric/Behavioral: Negative.     All other systems reviewed and are negative.      ROS obtained by MA. Reviewed. See HPI.     Meds/Allergies     Current Outpatient Medications   Medication Sig Dispense Refill    acetaminophen (TYLENOL) 325 mg tablet Take 3 tablets (975 mg total) by mouth every 8 (eight) hours  0    amLODIPine (NORVASC) 10 mg tablet Take 1 tablet (10 mg total) by mouth daily 90 tablet 3    aspirin (ECOTRIN LOW STRENGTH) 81 mg EC tablet Take 81 mg by mouth daily      atorvastatin (LIPITOR) 20 mg tablet Take 1 tablet (20 mg total) by mouth daily 90 tablet 1    carvedilol (COREG) 25 mg tablet Take 1 tablet (25 mg total) by mouth 2 (two) times a day 180 tablet 3 "    chlorthalidone 25 mg tablet Take 1.5 tablets (37.5 mg total) by mouth daily 135 tablet 3    Cholecalciferol (VITAMIN D3) 1000 UNIT/SPRAY LIQD Take by mouth      Cyanocobalamin (VITAMIN B-12) 1000 MCG/15ML LIQD Take 1 tablet by mouth daily      Fluticasone-Salmeterol (Advair Diskus) 250-50 mcg/dose inhaler Inhale 1 puff 2 (two) times a day (Patient taking differently: Inhale 1 puff in the morning) 180 blister 3    Folic Acid 20 MG CAPS Take 1 capsule by mouth daily      gabapentin (NEURONTIN) 100 mg capsule Take 1 capsule (100 mg total) by mouth 3 (three) times a day 60 capsule 1    lidocaine (LIDODERM) 5 % Apply 2 patches topically over 12 hours daily Remove & Discard patch within 12 hours or as directed by MD Do not start before November 2, 2023. (Patient not taking: Reported on 12/15/2023) 30 patch 1    metFORMIN (GLUCOPHAGE) 850 mg tablet Take 1 tablet (850 mg total) by mouth 2 (two) times a day with meals 180 tablet 3    methocarbamol (ROBAXIN) 500 mg tablet Take 1 tablet (500 mg total) by mouth every 6 (six) hours 60 tablet 1    Omega-3 Fatty Acids (FISH OIL) 1,000 mg Take 1 capsule by mouth daily      omeprazole (PriLOSEC) 40 MG capsule Take 1 capsule by mouth 2 (two) times a day      polyethylene glycol-propylene glycol (SYSTANE) 0.4-0.3 % Apply to eye      Pyridoxine HCl (vitamin B-6) 25 MG tablet Take 25 mg by mouth daily      triamcinolone (KENALOG) 0.1 % cream Apply topically 2 (two) times a day 80 g 3    valsartan (DIOVAN) 320 MG tablet Take 1 tablet (320 mg total) by mouth daily 90 tablet 3     No current facility-administered medications for this visit.       No Known Allergies    PAST HISTORY    Past Medical History:   Diagnosis Date    Accelerated essential hypertension     LAST ASSESSED 8/27/15    Benign positional vertigo 8/5/2016    Cataract, left eye 10/24/2017    Diverticulosis     Lumbar radiculopathy 12/7/2016    Polyp of colon 6/5/2017    Overview:  Added automatically from request for  surgery [id number]    Prostatitis     LAST ASSESSED 3/17/15       Past Surgical History:   Procedure Laterality Date    CARDIOVASCULAR STRESS TEST         Social History     Tobacco Use    Smoking status: Former     Current packs/day: 0.00     Average packs/day: 2.0 packs/day for 30.0 years (60.0 ttl pk-yrs)     Types: Cigarettes     Start date:      Quit date:      Years since quittin.0    Smokeless tobacco: Never   Vaping Use    Vaping status: Never Used   Substance Use Topics    Alcohol use: No    Drug use: No       Family History   Problem Relation Age of Onset    Emphysema Father         LUNG    Stroke Father          Above history personally reviewed.       EXAM    Vitals:There were no vitals taken for this visit.,There is no height or weight on file to calculate BMI.     Physical Exam  Constitutional:       Appearance: He is obese.   HENT:      Head: Normocephalic and atraumatic.   Pulmonary:      Effort: Pulmonary effort is normal.   Musculoskeletal:         General: Tenderness present.      Cervical back: Normal range of motion.   Neurological:      General: No focal deficit present.      Mental Status: He is alert and oriented to person, place, and time.      Deep Tendon Reflexes:      Reflex Scores:       Tricep reflexes are 2+ on the right side and 2+ on the left side.       Bicep reflexes are 2+ on the right side and 2+ on the left side.       Brachioradialis reflexes are 2+ on the right side and 2+ on the left side.       Patellar reflexes are 2+ on the right side and 2+ on the left side.       Achilles reflexes are 2+ on the right side and 2+ on the left side.  Psychiatric:         Speech: Speech normal.         Neurologic Exam     Mental Status   Oriented to person, place, and time.   Speech: speech is normal   Level of consciousness: alert    Cranial Nerves     CN III, IV, VI   Nystagmus: none     CN XI   CN XI normal.     Motor Exam   Muscle bulk: normal  Overall muscle tone: normal  Right  arm tone: normal  Left arm tone: normal  Right arm pronator drift: absent  Left arm pronator drift: absent  Right leg tone: normal  Left leg tone: normal    Sensory Exam   Light touch normal.     Gait, Coordination, and Reflexes     Reflexes   Right brachioradialis: 2+  Left brachioradialis: 2+  Right biceps: 2+  Left biceps: 2+  Right triceps: 2+  Left triceps: 2+  Right patellar: 2+  Left patellar: 2+  Right achilles: 2+  Left achilles: 2+  Right : 2+  Left : 2+  Right Bishop: absent  Left Bishop: absent  Right ankle clonus: absent  Left ankle clonus: absent        MEDICAL DECISION MAKING    Imaging Studies:     XR spine thoracic 3 vw    Result Date: 1/10/2024  Narrative: THORACIC SPINE INDICATION:   Unspecified fracture of T7-t8 vertebra, subsequent encounter for fracture with routine healing. COMPARISON:  Comparison made with previous examination(s) dated (DX) 12-Dec-2023,(DX) 13-Nov-2023,(DX) 01-Nov-2023,(CT) 01-Nov-2023,(CT) 27-Feb-2020. VIEWS:  XR SPINE THORACIC 3 VW Images: 4 FINDINGS: Previously noted T7 and T8 fractures appear stable without new height loss. No new vertebral body height loss. Thoracic vertebral alignment is within normal limits. Age related degenerative changes are seen. There is no displacement of the paraspinal line. The pedicles appear intact.     Impression: Stable thoracic spine radiographs. Electronically signed: 01/10/2024 04:19 PM Venkata Jackman, MPH,MD      I have personally reviewed pertinent reports.   and I have personally reviewed pertinent films in PACS

## 2024-01-22 DIAGNOSIS — S22.069A CLOSED FRACTURE OF EIGHTH THORACIC VERTEBRA, UNSPECIFIED FRACTURE MORPHOLOGY, INITIAL ENCOUNTER (HCC): ICD-10-CM

## 2024-01-22 RX ORDER — METHOCARBAMOL 500 MG/1
500 TABLET, FILM COATED ORAL EVERY 6 HOURS SCHEDULED
Qty: 60 TABLET | Refills: 1 | Status: SHIPPED | OUTPATIENT
Start: 2024-01-22

## 2024-01-22 RX ORDER — GABAPENTIN 100 MG/1
100 CAPSULE ORAL 3 TIMES DAILY
Qty: 60 CAPSULE | Refills: 5 | Status: SHIPPED | OUTPATIENT
Start: 2024-01-22

## 2024-01-22 NOTE — TELEPHONE ENCOUNTER
Reason for call:   [x] Refill   [] Prior Auth  [] Other:     Office:   [] PCP/Provider -   [x] Specialty/Provider -     ROBAXIN  500 MG    GABAPENTIN  100 MG    [facility]:  [facility]  [address]  [place]    Does the patient have enough for 3 days?   [] Yes   [x] No - Send as HP to POD

## 2024-01-26 ENCOUNTER — RA CDI HCC (OUTPATIENT)
Dept: OTHER | Facility: HOSPITAL | Age: 79
End: 2024-01-26

## 2024-02-05 ENCOUNTER — TELEPHONE (OUTPATIENT)
Dept: NEUROSURGERY | Facility: CLINIC | Age: 79
End: 2024-02-05

## 2024-02-06 ENCOUNTER — APPOINTMENT (OUTPATIENT)
Dept: RADIOLOGY | Facility: MEDICAL CENTER | Age: 79
End: 2024-02-06
Payer: COMMERCIAL

## 2024-02-06 DIAGNOSIS — S22.060D CLOSED WEDGE COMPRESSION FRACTURE OF T8 VERTEBRA WITH ROUTINE HEALING, SUBSEQUENT ENCOUNTER: ICD-10-CM

## 2024-02-06 PROCEDURE — 72072 X-RAY EXAM THORAC SPINE 3VWS: CPT

## 2024-02-07 DIAGNOSIS — I10 ESSENTIAL HYPERTENSION: ICD-10-CM

## 2024-02-07 RX ORDER — AMLODIPINE BESYLATE 10 MG/1
10 TABLET ORAL DAILY
Qty: 90 TABLET | Refills: 1 | Status: SHIPPED | OUTPATIENT
Start: 2024-02-07

## 2024-02-07 NOTE — TELEPHONE ENCOUNTER
Reason for call:   [x] Refill   [] Prior Auth  [] Other:     Office:   [x] PCP/Provider - Siri OLSON / Adolfo  [] Specialty/Provider -     Medication: amlodipine    Dose/Frequency: 10mg qd    Quantity: 90    Pharmacy: [facility] - [place], [place] - [address] [place] [address]     Does the patient have enough for 3 days?   [x] Yes   [] No - Send as HP to POD

## 2024-02-09 ENCOUNTER — OFFICE VISIT (OUTPATIENT)
Dept: NEUROSURGERY | Facility: CLINIC | Age: 79
End: 2024-02-09
Payer: COMMERCIAL

## 2024-02-09 VITALS
BODY MASS INDEX: 37.56 KG/M2 | HEIGHT: 68 IN | RESPIRATION RATE: 16 BRPM | DIASTOLIC BLOOD PRESSURE: 82 MMHG | SYSTOLIC BLOOD PRESSURE: 126 MMHG | WEIGHT: 247.8 LBS | HEART RATE: 48 BPM | TEMPERATURE: 98 F | OXYGEN SATURATION: 99 %

## 2024-02-09 DIAGNOSIS — S22.069A: Primary | ICD-10-CM

## 2024-02-09 PROCEDURE — 99213 OFFICE O/P EST LOW 20 MIN: CPT | Performed by: PHYSICIAN ASSISTANT

## 2024-02-09 NOTE — PROGRESS NOTES
"Office Note - Neurosurgery   Poncho Villalobos Jr. 78 y.o. male MRN: [id number]      Assessment:    Patient is a 78 yrs old gentleman with  PMHx of Kessler's Esophagus, Fatty liver, FIOR, HTN, PAD, DM-Neuropathy, Hyperlipidemia COPD and traumatic T8 fracture. Patient with Hx of fall about 11-12 weeks ago after he had syncopal episode.  Patient is here today with his follow-up x-rays which demonstrate stable alignment of thoracic spine and T8 compression fracture.  He is wearing TLSO brace.  Patient reports feeling much better with his back pain.  No weakness in the extremities.  Denies any gait issues or tendency to fall.    Exam-alert and oriented x 3.  Moves all extremities.  Strength is 5/5 bilaterally in both upper and lower extremities.  Sensation to light touch intact throughout. DTR 2+ no clonus bilaterally. Non tender on palpation of thoracic spine, Gait stable, wearing TLSO.    Hx, Exam and images reviewed with the patient and his wife. Mx plan discussed. Patient's pain improved remarkably, and non tender on exam, images shows stable mild compression of T8. From Nsx perspective, no procedure or further follow up imaging is required. Advised to continue weaning brace, fall precaution. F/U on PRN basis. All questions and concerns were answered to patient's satisfaction. Patient expressed her understandings and agreed with the plan.      Plan:  Patient reports his remarkable improvement with his back pain  Followed up 11-12 weeks for mild SEP T8 fracture  Non-tender on palpation of Thoracic spine palpation  Advised to continue weaning the brace  Recommend F/U on PRN basis  Call with questions or concerns        Subjective/Objective     C/C: \" Here for 11-12 weeks follow up of mild SEP T8 compression\"        HPI  All patient's medical histories were reviewed and updated as appropriate:    Allergies, current medication list, past medical history, past surgical history, family history, social history, and current " medical lists    Patient is a 78 years old pleasant gentleman here today with his wife for his 11 to 12 weeks follow-up of mild superior endplate T8 compression deformity following fall at home.  History of syncopal incident.  Patient was wearing TLSO brace and reports his remarkable improvement with his back pain.  No radicular symptoms, numbness across his trunk.  Denies any gait issues or weakness in the extremities.  No bowel or bladder dysfunction.  Patient denies any fever, chills, rigors, cough or chest pain.   ROS  Review of system personally reviewed and updated as follows:   Review of Systems   Constitutional: Negative.    HENT: Negative.     Eyes: Negative.    Respiratory: Negative.     Cardiovascular: Negative.    Gastrointestinal: Negative.    Endocrine: Negative.    Genitourinary: Negative.    Musculoskeletal: Negative.         F/U on T8 Fx- T/S Xray on 1/10/24  S/p Syncopal Episode on Aspirin @ home on 11/1/23      4 WEEK FOLLOW UP WITH T SPINE XRAYS DONE 2/6/24      Wearing Brace    Pain only first thing in the morning across abdomen and little when patient coughs   Patient states he is doing well since last ov    Skin: Negative.    Allergic/Immunologic: Negative.    Neurological: Negative.    Hematological: Negative.    Psychiatric/Behavioral: Negative.     All other systems reviewed and are negative.      Family History    Family History   Problem Relation Age of Onset    Emphysema Father         LUNG    Stroke Father        Social History    Social History     Socioeconomic History    Marital status: /Civil Union     Spouse name: Not on file    Number of children: Not on file    Years of education: Not on file    Highest education level: Not on file   Occupational History    Not on file   Tobacco Use    Smoking status: Former     Current packs/day: 0.00     Average packs/day: 2.0 packs/day for 30.0 years (60.0 ttl pk-yrs)     Types: Cigarettes     Start date: 1970     Quit date: 2000      Years since quittin.1    Smokeless tobacco: Never   Vaping Use    Vaping status: Never Used   Substance and Sexual Activity    Alcohol use: No    Drug use: No    Sexual activity: Not on file   Other Topics Concern    Not on file   Social History Narrative    Not on file     Social Determinants of Health     Financial Resource Strain: Low Risk  (2023)    Overall Financial Resource Strain (CARDIA)     Difficulty of Paying Living Expenses: Not very hard   Food Insecurity: Not on file   Transportation Needs: No Transportation Needs (2023)    PRAPARE - Transportation     Lack of Transportation (Medical): No     Lack of Transportation (Non-Medical): No   Physical Activity: Not on file   Stress: Not on file   Social Connections: Not on file   Intimate Partner Violence: Not on file   Housing Stability: Not on file       Past Medical History    Past Medical History:   Diagnosis Date    Accelerated essential hypertension     LAST ASSESSED 8/27/15    Benign positional vertigo 2016    Cataract, left eye 10/24/2017    Diverticulosis     Lumbar radiculopathy 2016    Polyp of colon 2017    Overview:  Added automatically from request for surgery [id number]    Prostatitis     LAST ASSESSED 3/17/15       Surgical History    Past Surgical History:   Procedure Laterality Date    CARDIOVASCULAR STRESS TEST         Medications      Current Outpatient Medications:     acetaminophen (TYLENOL) 325 mg tablet, Take 3 tablets (975 mg total) by mouth every 8 (eight) hours, Disp: , Rfl: 0    amLODIPine (NORVASC) 10 mg tablet, Take 1 tablet (10 mg total) by mouth daily, Disp: 90 tablet, Rfl: 1    aspirin (ECOTRIN LOW STRENGTH) 81 mg EC tablet, Take 81 mg by mouth daily, Disp: , Rfl:     atorvastatin (LIPITOR) 20 mg tablet, Take 1 tablet (20 mg total) by mouth daily, Disp: 90 tablet, Rfl: 1    carvedilol (COREG) 25 mg tablet, Take 1 tablet (25 mg total) by mouth 2 (two) times a day, Disp: 180 tablet, Rfl: 3    chlorthalidone  25 mg tablet, Take 1.5 tablets (37.5 mg total) by mouth daily, Disp: 135 tablet, Rfl: 3    Cholecalciferol (VITAMIN D3) 1000 UNIT/SPRAY LIQD, Take by mouth, Disp: , Rfl:     Cyanocobalamin (VITAMIN B-12) 1000 MCG/15ML LIQD, Take 1 tablet by mouth daily, Disp: , Rfl:     Fluticasone-Salmeterol (Advair Diskus) 250-50 mcg/dose inhaler, Inhale 1 puff 2 (two) times a day (Patient taking differently: Inhale 1 puff in the morning), Disp: 180 blister, Rfl: 3    Folic Acid 20 MG CAPS, Take 1 capsule by mouth daily, Disp: , Rfl:     gabapentin (NEURONTIN) 100 mg capsule, Take 1 capsule (100 mg total) by mouth 3 (three) times a day, Disp: 60 capsule, Rfl: 5    lidocaine (LIDODERM) 5 %, Apply 2 patches topically over 12 hours daily Remove & Discard patch within 12 hours or as directed by MD Do not start before November 2, 2023. (Patient not taking: Reported on 12/15/2023), Disp: 30 patch, Rfl: 1    metFORMIN (GLUCOPHAGE) 850 mg tablet, Take 1 tablet (850 mg total) by mouth 2 (two) times a day with meals, Disp: 180 tablet, Rfl: 3    methocarbamol (ROBAXIN) 500 mg tablet, Take 1 tablet (500 mg total) by mouth every 6 (six) hours, Disp: 60 tablet, Rfl: 1    Omega-3 Fatty Acids (FISH OIL) 1,000 mg, Take 1 capsule by mouth daily, Disp: , Rfl:     omeprazole (PriLOSEC) 40 MG capsule, Take 1 capsule by mouth 2 (two) times a day, Disp: , Rfl:     polyethylene glycol-propylene glycol (SYSTANE) 0.4-0.3 %, Apply to eye, Disp: , Rfl:     Pyridoxine HCl (vitamin B-6) 25 MG tablet, Take 25 mg by mouth daily, Disp: , Rfl:     triamcinolone (KENALOG) 0.1 % cream, Apply topically 2 (two) times a day, Disp: 80 g, Rfl: 3    valsartan (DIOVAN) 320 MG tablet, Take 1 tablet (320 mg total) by mouth daily, Disp: 90 tablet, Rfl: 3    Allergies    No Known Allergies    The following portions of the patient's history were reviewed and updated as appropriate: allergies, current medications, past family history, past medical history, past social history,  past surgical history, and problem list.    Investigations    I personally reviewed the XRAY results with the patient:        Physical Exam    Vitals:    02/09/24 0853   Resp: 16       Physical Exam  Constitutional:       Appearance: He is obese.   HENT:      Head: Normocephalic and atraumatic.   Pulmonary:      Effort: Pulmonary effort is normal.   Musculoskeletal:         General: No tenderness.      Cervical back: Normal range of motion.   Neurological:      General: No focal deficit present.      Mental Status: He is alert and oriented to person, place, and time.      GCS: GCS eye subscore is 4. GCS verbal subscore is 5. GCS motor subscore is 6.      Cranial Nerves: Cranial nerves 2-12 are intact.      Sensory: Sensation is intact.      Motor: Motor function is intact.      Coordination: Finger-Nose-Finger Test normal.      Deep Tendon Reflexes: Reflexes are normal and symmetric.      Reflex Scores:       Tricep reflexes are 2+ on the right side and 2+ on the left side.       Bicep reflexes are 2+ on the right side and 2+ on the left side.       Brachioradialis reflexes are 2+ on the right side and 2+ on the left side.       Patellar reflexes are 2+ on the right side and 2+ on the left side.       Achilles reflexes are 2+ on the right side and 2+ on the left side.  Psychiatric:         Speech: Speech normal.       Neurologic Exam     Mental Status   Oriented to person, place, and time.   Speech: speech is normal   Level of consciousness: alert    Cranial Nerves   Cranial nerves II through XII intact.     CN III, IV, VI   Nystagmus: none     CN XI   CN XI normal.     Motor Exam   Muscle bulk: normal  Overall muscle tone: normal  Right arm tone: normal  Left arm tone: normal  Right arm pronator drift: absent  Left arm pronator drift: absent  Right leg tone: normal  Left leg tone: normal    Sensory Exam   Light touch normal.     Gait, Coordination, and Reflexes     Coordination   Finger to nose coordination:  normal    Reflexes   Right brachioradialis: 2+  Left brachioradialis: 2+  Right biceps: 2+  Left biceps: 2+  Right triceps: 2+  Left triceps: 2+  Right patellar: 2+  Left patellar: 2+  Right achilles: 2+  Left achilles: 2+  Right : 2+  Left : 2+  Right Bishop: absent  Left Bishop: absent  Right ankle clonus: absent  Left ankle clonus: absent

## 2024-02-15 ENCOUNTER — OFFICE VISIT (OUTPATIENT)
Dept: FAMILY MEDICINE CLINIC | Facility: CLINIC | Age: 79
End: 2024-02-15
Payer: COMMERCIAL

## 2024-02-15 VITALS
OXYGEN SATURATION: 97 % | TEMPERATURE: 97.8 F | HEIGHT: 68 IN | WEIGHT: 244 LBS | BODY MASS INDEX: 36.98 KG/M2 | RESPIRATION RATE: 18 BRPM | HEART RATE: 55 BPM | SYSTOLIC BLOOD PRESSURE: 132 MMHG | DIASTOLIC BLOOD PRESSURE: 78 MMHG

## 2024-02-15 DIAGNOSIS — E78.2 MIXED HYPERLIPIDEMIA: ICD-10-CM

## 2024-02-15 DIAGNOSIS — R80.9 TYPE 2 DIABETES MELLITUS WITH MICROALBUMINURIA, WITHOUT LONG-TERM CURRENT USE OF INSULIN: Primary | ICD-10-CM

## 2024-02-15 DIAGNOSIS — K76.0 FATTY LIVER: ICD-10-CM

## 2024-02-15 DIAGNOSIS — G47.33 OSA (OBSTRUCTIVE SLEEP APNEA): ICD-10-CM

## 2024-02-15 DIAGNOSIS — E66.01 OBESITY, MORBID (HCC): ICD-10-CM

## 2024-02-15 DIAGNOSIS — E11.42 DIABETIC POLYNEUROPATHY ASSOCIATED WITH TYPE 2 DIABETES MELLITUS (HCC): ICD-10-CM

## 2024-02-15 DIAGNOSIS — K22.70 BARRETT'S ESOPHAGUS WITHOUT DYSPLASIA: ICD-10-CM

## 2024-02-15 DIAGNOSIS — J44.9 CHRONIC OBSTRUCTIVE PULMONARY DISEASE, UNSPECIFIED COPD TYPE (HCC): ICD-10-CM

## 2024-02-15 DIAGNOSIS — E11.29 TYPE 2 DIABETES MELLITUS WITH MICROALBUMINURIA, WITHOUT LONG-TERM CURRENT USE OF INSULIN: Primary | ICD-10-CM

## 2024-02-15 DIAGNOSIS — I10 PRIMARY HYPERTENSION: ICD-10-CM

## 2024-02-15 DIAGNOSIS — K21.9 GASTROESOPHAGEAL REFLUX DISEASE WITHOUT ESOPHAGITIS: ICD-10-CM

## 2024-02-15 DIAGNOSIS — I73.9 PAD (PERIPHERAL ARTERY DISEASE) (HCC): ICD-10-CM

## 2024-02-15 PROBLEM — D3A.8 NEUROENDOCRINE TUMOR: Status: RESOLVED | Noted: 2021-05-27 | Resolved: 2024-02-15

## 2024-02-15 PROBLEM — S22.069A: Status: RESOLVED | Noted: 2023-11-01 | Resolved: 2024-02-15

## 2024-02-15 PROBLEM — R55 SYNCOPE AND COLLAPSE: Status: RESOLVED | Noted: 2023-11-01 | Resolved: 2024-02-15

## 2024-02-15 PROCEDURE — 99214 OFFICE O/P EST MOD 30 MIN: CPT | Performed by: FAMILY MEDICINE

## 2024-02-15 NOTE — PROGRESS NOTES
Name: Poncho Villalobos Jr.      : 1945      MRN: [id number]  Encounter Provider: London Mata MD  Encounter Date: 2/15/2024   Encounter department: [facility]    Assessment & Plan     1. Type 2 diabetes mellitus with microalbuminuria, without long-term current use of insulin   -     Hemoglobin A1C; Standing  -     Albumin / creatinine urine ratio; Standing    2. Diabetic polyneuropathy associated with type 2 diabetes mellitus (HCC)    3. Primary hypertension  -     Comprehensive metabolic panel; Standing  -     CBC and Platelet; Standing    4. Mixed hyperlipidemia  -     Lipid Panel with Direct LDL reflex; Standing    5. PAD (peripheral artery disease) (HCC)    6. FIOR (obstructive sleep apnea)    7. Chronic obstructive pulmonary disease, unspecified COPD type (HCC)    8. Gastroesophageal reflux disease without esophagitis    9. Kessler's esophagus without dysplasia    10. Fatty liver    11. Obesity, morbid (HCC)    Continue with current medications.  Office visit 6 months with repeat labs.       Subjective     Follow up OV for chronic medical problems. Medications reviewed. Type 2 DM  on Metformin 850 mg BID.  Side effects on 1,000 mg BID. 2024  A1c 7.4. 2023 Urine albumin 139.5  on ARB. No hypoglycemic events. Mild neuropathy symptoms improved with Gabapentin. Lat eye exam 2018 no diabetic retinopathy.  Hypertension on current multi drug regimen 2023 creatinine 1.07. GFR 67.   Electrolytes normal . 2024 Hgb 12.6 . Prior Nephrology evaluation and workup for secondary causes of hypertension- metanephrine normal at 47. normetanephrine elevated at 183. renin 19.27. aldosterone 2.2. Renal artery dopplers 2015 no ISIAH. kidneys hypertrophic. 2.8 cm cyst left kidney.  Mixed hyperlipidemia and PAD on Atorvastatin 20 mg/day-side effects from 40 mg dose- and 2 fish oil capsules a day. 2023 Lipid profile cholesterol 110. TGs 180 decreased from 244. HDL 32. LDL 42. 2023 LFTs normal.  06/2022 TSH 1.980. 02/2020 CT scan abdomen and pelvis ordered by his surgeon for recurrent abdominal pain.  Incidental finding significant triple-vessel coronary artery calcification.  No pericardial effusion.  Scattered sub 3 mm central lobular calcified and noncalcified pulmonary nodules.  Atherosclerotic changes of aorta no aneurysm.         Lab Results   Component Value Date    HGBA1C 7.4 (H) 01/11/2024     Lab Results   Component Value Date    WBC 6.34 01/11/2024    HGB 12.6 01/11/2024    HCT 38.6 01/11/2024    MCV 85 01/11/2024     01/11/2024     Lab Results   Component Value Date    IRON 69 01/11/2024    TIBC 380 01/11/2024    FERRITIN 23 (L) 01/11/2024     Lab Results   Component Value Date     10/06/2015    SODIUM 137 11/01/2023    K 3.9 11/01/2023     11/01/2023    CO2 24 11/01/2023    ANIONGAP 9 10/06/2015    AGAP 8 11/01/2023    BUN 28 (H) 11/01/2023    CREATININE 1.07 11/01/2023    GLUC 215 (H) 11/01/2023    GLUF 144 (H) 08/31/2023    CALCIUM 9.5 11/01/2023    AST 19 11/01/2023    ALT 26 11/01/2023    ALKPHOS 50 11/01/2023    PROT 7.9 02/27/2015    TP 6.8 11/01/2023    BILITOT 0.42 02/27/2015    TBILI 0.43 11/01/2023    EGFR 67 11/01/2023     Lab Results   Component Value Date    CHOLESTEROL 110 08/31/2023    CHOLESTEROL 118 07/06/2023    CHOLESTEROL 123 12/27/2022     Lab Results   Component Value Date    HDL 32 (L) 08/31/2023    HDL 33 (L) 07/06/2023    HDL 33 (L) 12/27/2022     Lab Results   Component Value Date    TRIG 180 (H) 08/31/2023    TRIG 199 (H) 07/06/2023    TRIG 244 (H) 12/27/2022     Lab Results   Component Value Date    LDLCALC 42 08/31/2023            Review of Systems   Constitutional:  Positive for fatigue. Negative for appetite change, chills, fever and unexpected weight change.   HENT:  Negative for congestion, ear pain, rhinorrhea, sinus pain, sore throat and trouble swallowing.         + chronic nasal congestion. He has been using PRN Coricidan    Eyes:   Negative for visual disturbance.        S/p cataract surgery OS   Respiratory:  Positive for apnea. Negative for cough, shortness of breath and wheezing.         FIOR/CPAP usage. FIOR on CPAP with auto adjustment feature.  He is currently benefiting from his current use of CPAP with restorative sleep and no excessive daytime tiredness   COPD stable on Advair once a day   Cardiovascular:  Negative for chest pain, palpitations and leg swelling.        See HPI 11/2023  Echo Left Ventricle: Left ventricular cavity size is normal. Wall thickness is normal. The left ventricular ejection fraction is 60%. Systolic function is normal. Wall motion is normal. Diastolic function is mildly abnormal, consistent with grade I (abnormal) relaxation  Left Atrium: The atrium is dilated. Aortic Valve: There is aortic valve sclerosis.    PAD with intermittent leg claudications. No rest pain. pain left hip and calf pain after walking long distances. 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 11/2016 arterial dopplers LEs.Diffuse heterogeneous plaque throughout both legs. Evidence of right lower extremity occlusive disease in mid SFA. aortoiliac study normal but limited views of proximal common iliac arteries due to body habitus     08/2015 admission for right-sided chest pain associated with increased shortness of breath and accelerated hypertension. He ruled out for MI. EKG no acute EKG changes. nuclear stress test normal. no perfusion abnormalities. EF 67%.   Gastrointestinal:  Negative for abdominal pain, blood in stool, constipation, diarrhea, nausea and vomiting.        GERD/Kessler's  stable on Omeprazole. No dysphagia. Repeat EGD 06/2023. History of colon polyps-Colonoscopy 09/2022. 02/2020 CT scan abdomen and pelvis 3.8 cm exophytic left renal intra pole cyst.  Atherosclerotic changes of abdominal aorta no aneurysm.  Enlarged prostate.  Small fat containing umbilical hernia.  Small  fat containing inguinal hernias. Well-differentiated neuroendocrine tumor low-grade.      Fatty liver. 01/2018 abdominal ultrasound normal except for mild diffuse fatty infiltration of liver.    Lab Results       Component                Value               Date                       ALT                      26                  11/01/2023                 AST                      19                  11/01/2023                 ALKPHOS                  50                  11/01/2023                 BILITOT                  0.43             11/01/2023                                                        Endocrine: Negative for polydipsia and polyuria.   Genitourinary:  Negative for difficulty urinating.        Lab Results       Component                Value               Date                       PSA                      1.5                 09/22/2020                 PSA                      0.7                 02/27/2015                 PSA                      0.7                 03/18/2014                      Musculoskeletal:  Negative for arthralgias, back pain and myalgias.        S/p fall 11/2023 sustaining thoracic spine vertebral fractures.  X rays thoracic spine T7 and T8 vertebral body fractures. CT head  No acute intracranial process. No skull fracture. Chronic microangiopathy. CTA Acute fracture of anterior paravertebral ossification at the level of T7-8 with contiguous fracture extension into the T8 vertebral body. No significant displacement, vertebral body height loss or retropulsion. Trace bilateral pleural effusions with minimal adjacent subsegmental atelectasis. No aortic dissection or aneurysm. No aortic or major branch vessel occlusion or significant stenosis. Incidental thyroid nodule(s).      Patient was reevaluated by Neurosurgery this month and is no longer wearing back brace.  He stopped using muscle relaxants. He remains on low-dose Gabapentin. No new bowel or bladder changes.  No lower  extremity weakness.   Skin:  Negative for rash.        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   Neurological:  Negative for dizziness, weakness and headaches.   Hematological:  Negative for adenopathy. Does not bruise/bleed easily.        07/2023 CBC microcytic anemia  No rectal bleeding or melena. See GI ROS      GERD on Omeprazole 40 mg twice daily. EGD 6/2023 gastritis/gastric polyps.  Hiatal hernia 3 cm  9/2022 colonoscopy normal except for diverticulosis. on oral iron      Lab Results       Component                Value               Date                       WBC                      6.34                01/11/2024                 HGB                      12.6                01/11/2024                 HCT                      38.6                01/11/2024                 MCV                      85                  01/11/2024                 PLT                      171                 01/11/2024                    Lab Results       Component                Value               Date                       IRON                     69                  01/11/2024                 TIBC                     380                 01/11/2024                 FERRITIN                 23 (L)              01/11/2024                          Lab Results       Component                Value               Date                       [id number]               477                 07/13/2023            Lab Results       Component                Value               Date                       FOLATE                   >22.3               07/13/2023                 Lab Results       Component                Value               Date                       RETICCTPCT               1.88 (H)            07/13/2023            Lab Results       Component                Value               Date                       SPEP                no monoclonal bands         07/13/2023                     Hemoglobin       Date                     Value               Ref Range           Status                2023               11.1 (L)            12.0 - 17.0 g/*     Final                 2022               12.7                12.0 - 17.0 g/*     Final                 2022               13.0                12.0 - 17.0 g/*     Final                 2015               15.3                12.0 - 17.0 g/*     Final                 2015               16.4                12.0 - 17.0 g/*     Final                 2015               15.3                12.0 - 17.0 g/*     Final                 Psychiatric/Behavioral:  Negative for dysphoric mood and sleep disturbance.        Past Medical History:   Diagnosis Date    Accelerated essential hypertension     LAST ASSESSED 8/27/15    Benign positional vertigo 2016    Cataract, left eye 10/24/2017    Closed T8 spinal fracture (HCC)     Diverticulosis     Lumbar radiculopathy 2016    Polyp of colon 2017    Overview:  Added automatically from request for surgery [id number]    Prostatitis     LAST ASSESSED 3/17/15     Past Surgical History:   Procedure Laterality Date    CARDIOVASCULAR STRESS TEST       Family History   Problem Relation Age of Onset    Emphysema Father         LUNG    Stroke Father      Social History     Socioeconomic History    Marital status: /Civil Union     Spouse name: None    Number of children: None    Years of education: None    Highest education level: None   Occupational History    None   Tobacco Use    Smoking status: Former     Current packs/day: 0.00     Average packs/day: 2.0 packs/day for 30.0 years (60.0 ttl pk-yrs)     Types: Cigarettes     Start date:      Quit date: 2000     Years since quittin.1    Smokeless tobacco: Never   Vaping Use    Vaping status: Never Used   Substance and Sexual Activity    Alcohol use: No    Drug use: No    Sexual activity: Not Currently   Other Topics  Concern    None   Social History Narrative    None     Social Determinants of Health     Financial Resource Strain: Low Risk  (7/6/2023)    Overall Financial Resource Strain (CARDIA)     Difficulty of Paying Living Expenses: Not very hard   Food Insecurity: Not on file   Transportation Needs: No Transportation Needs (7/6/2023)    PRAPARE - Transportation     Lack of Transportation (Medical): No     Lack of Transportation (Non-Medical): No   Physical Activity: Not on file   Stress: Not on file   Social Connections: Not on file   Intimate Partner Violence: Not on file   Housing Stability: Not on file     Current Outpatient Medications on File Prior to Visit   Medication Sig    acetaminophen (TYLENOL) 325 mg tablet Take 3 tablets (975 mg total) by mouth every 8 (eight) hours    amLODIPine (NORVASC) 10 mg tablet Take 1 tablet (10 mg total) by mouth daily    aspirin (ECOTRIN LOW STRENGTH) 81 mg EC tablet Take 81 mg by mouth daily    atorvastatin (LIPITOR) 20 mg tablet Take 1 tablet (20 mg total) by mouth daily    carvedilol (COREG) 25 mg tablet Take 1 tablet (25 mg total) by mouth 2 (two) times a day    chlorthalidone 25 mg tablet Take 1.5 tablets (37.5 mg total) by mouth daily    Cholecalciferol (VITAMIN D3) 1000 UNIT/SPRAY LIQD Take by mouth    Cyanocobalamin (VITAMIN B-12) 1000 MCG/15ML LIQD Take 1 tablet by mouth daily    Fluticasone-Salmeterol (Advair Diskus) 250-50 mcg/dose inhaler Inhale 1 puff 2 (two) times a day (Patient taking differently: Inhale 1 puff in the morning)    Folic Acid 20 MG CAPS Take 1 capsule by mouth daily    gabapentin (NEURONTIN) 100 mg capsule Take 1 capsule (100 mg total) by mouth 3 (three) times a day    metFORMIN (GLUCOPHAGE) 850 mg tablet Take 1 tablet (850 mg total) by mouth 2 (two) times a day with meals    Omega-3 Fatty Acids (FISH OIL) 1,000 mg Take 1 capsule by mouth daily    omeprazole (PriLOSEC) 40 MG capsule Take 1 capsule by mouth 2 (two) times a day    polyethylene  "glycol-propylene glycol (SYSTANE) 0.4-0.3 % Apply to eye    Pyridoxine HCl (vitamin B-6) 25 MG tablet Take 25 mg by mouth daily    triamcinolone (KENALOG) 0.1 % cream Apply topically 2 (two) times a day    valsartan (DIOVAN) 320 MG tablet Take 1 tablet (320 mg total) by mouth daily    [DISCONTINUED] lidocaine (LIDODERM) 5 % Apply 2 patches topically over 12 hours daily Remove & Discard patch within 12 hours or as directed by MD Do not start before November 2, 2023. (Patient not taking: Reported on 12/15/2023)    [DISCONTINUED] methocarbamol (ROBAXIN) 500 mg tablet Take 1 tablet (500 mg total) by mouth every 6 (six) hours (Patient not taking: Reported on 2/15/2024)     No Known Allergies  Immunization History   Administered Date(s) Administered    COVID-19 PFIZER VACCINE 0.3 ML IM 02/19/2021, 03/11/2021, 10/19/2021    COVID-19 Pfizer Vac BIVALENT Roger-sucrose 12 Yr+ IM 10/08/2022    COVID-19 Pfizer mRNA vacc PF roger-sucrose 12 yr and older (Comirnaty) 11/12/2023    COVID-19 Pfizer vac (Roger-sucrose, gray cap) 12 yr+ IM 05/05/2022    H1N1, All Formulations 02/09/2010    INFLUENZA 11/05/2019    Influenza Split High Dose Preservative Free IM 09/23/2013, 10/01/2014, 11/15/2016, 10/24/2017, 11/05/2019    Influenza, high dose seasonal 0.7 mL 11/19/2018, 11/05/2019, 08/24/2020, 09/22/2021, 11/03/2023    Influenza, seasonal, injectable 09/06/2011, 09/11/2012    Pneumococcal Conjugate 13-Valent 10/12/2015    Pneumococcal Conjugate Vaccine 20-valent (Pcv20), Polysace 11/30/2023    Pneumococcal Polysaccharide PPV23 09/06/2011, 11/15/2016    Respiratory Syncytial Virus Vaccine (Recombinant, Adjuvanted) 02/10/2024       Objective     /78 (BP Location: Left arm, Patient Position: Sitting, Cuff Size: Large)   Pulse 55   Temp 97.8 °F (36.6 °C)   Resp 18   Ht 5' 8\" (1.727 m)   Wt 111 kg (244 lb)   SpO2 97%   BMI 37.10 kg/m²      BP Readings from Last 3 Encounters:   02/15/24 132/78   02/09/24 126/82   01/12/24 156/74    "   Wt Readings from Last 3 Encounters:   02/15/24 111 kg (244 lb)   24 112 kg (247 lb 12.8 oz)   24 108 kg (238 lb)        Physical Exam  Vitals and nursing note reviewed.   Constitutional:       General: He is not in acute distress.     Appearance: He is well-developed.   HENT:      Head: Atraumatic.      Right Ear: Tympanic membrane and ear canal normal.      Left Ear: Tympanic membrane and ear canal normal.   Eyes:      General: No scleral icterus.     Extraocular Movements: Extraocular movements intact.      Conjunctiva/sclera: Conjunctivae normal.      Pupils: Pupils are equal, round, and reactive to light.   Neck:      Thyroid: No thyroid mass or thyromegaly.      Vascular: Normal carotid pulses. No carotid bruit or JVD.      Trachea: No tracheal deviation.   Cardiovascular:      Rate and Rhythm: Normal rate and regular rhythm.      Pulses: Pulses are weak.           Carotid pulses are 2+ on the right side and 2+ on the left side.       Dorsalis pedis pulses are 2+ on the right side and 0 on the left side.        Posterior tibial pulses are 0 on the right side and 0 on the left side.      Heart sounds: Murmur heard.      Systolic murmur is present with a grade of 1/6.      No gallop.   Pulmonary:      Effort: Pulmonary effort is normal. No respiratory distress.      Breath sounds: Normal breath sounds. No wheezing or rales.   Musculoskeletal:      Thoracic back: No spasms, tenderness or bony tenderness.      Right lower le+ Pitting Edema present.      Left lower le+ Pitting Edema present.   Feet:      Right foot:      Skin integrity: No ulcer, skin breakdown, erythema, warmth, callus or dry skin.      Left foot:      Skin integrity: No ulcer, skin breakdown, erythema, warmth, callus or dry skin.   Lymphadenopathy:      Cervical: No cervical adenopathy.      Upper Body:      Right upper body: No supraclavicular adenopathy.      Left upper body: No supraclavicular adenopathy.   Skin:      Findings: No rash.      Nails: There is no clubbing.   Neurological:      General: No focal deficit present.      Mental Status: He is alert and oriented to person, place, and time.      Motor: No weakness.      Gait: Gait normal.      Deep Tendon Reflexes: Reflexes abnormal.      Reflex Scores:       Patellar reflexes are 1+ on the right side and 1+ on the left side.       Achilles reflexes are 0 on the right side and 0 on the left side.     Comments: No weakness in lower extremities No ankle clonus    Psychiatric:         Mood and Affect: Mood normal.       Patient's shoes and socks removed.    Right Foot/Ankle   Right Foot Inspection  Skin Exam: skin normal and skin intact. No dry skin, no warmth, no callus, no erythema, no maceration, no abnormal color, no pre-ulcer, no ulcer and no callus.     Toe Exam: No swelling, no tenderness, erythema and  no right toe deformity    Sensory   Monofilament testing: intact    Vascular  Capillary refills: < 3 seconds  The right DP pulse is 2+. The right PT pulse is 0.     Left Foot/Ankle  Left Foot Inspection  Skin Exam: skin normal and skin intact. No dry skin, no warmth, no erythema, no maceration, normal color, no pre-ulcer, no ulcer and no callus.     Toe Exam: ROM and strength within normal limits. No swelling, no tenderness, no erythema and no left toe deformity.     Sensory   Monofilament testing: intact    Vascular  Capillary refills: < 3 seconds  The left DP pulse is 0. The left PT pulse is 0.     Assign Risk Category  No deformity present  Loss of protective sensation  Weak pulses  Risk: 2       London Mata MD

## 2024-03-14 DIAGNOSIS — I10 ESSENTIAL HYPERTENSION: ICD-10-CM

## 2024-03-14 RX ORDER — VALSARTAN 320 MG/1
320 TABLET ORAL DAILY
Qty: 90 TABLET | Refills: 1 | Status: SHIPPED | OUTPATIENT
Start: 2024-03-14

## 2024-03-14 NOTE — TELEPHONE ENCOUNTER
Reason for call:   [x] Refill   [] Prior Auth  [] Other:     Office:   [x] PCP/Provider - London Mata   [] Specialty/Provider -     Medication: valsartan (DIOVAN) 320 MG tablet     Dose/Frequency: Take 1 tablet (320 mg total) by mouth daily     Quantity: 90 + 1 refill    Pharmacy: [facility] [address] [place] [address]     Does the patient have enough for 3 days?   [x] Yes   [] No - Send as HP to POD

## 2024-03-19 DIAGNOSIS — E78.2 MIXED HYPERLIPIDEMIA: ICD-10-CM

## 2024-03-19 DIAGNOSIS — I10 ESSENTIAL HYPERTENSION: ICD-10-CM

## 2024-03-19 RX ORDER — ATORVASTATIN CALCIUM 20 MG/1
20 TABLET, FILM COATED ORAL DAILY
Qty: 90 TABLET | Refills: 0 | Status: SHIPPED | OUTPATIENT
Start: 2024-03-19

## 2024-03-19 RX ORDER — CARVEDILOL 25 MG/1
25 TABLET ORAL 2 TIMES DAILY
Qty: 180 TABLET | Refills: 0 | Status: SHIPPED | OUTPATIENT
Start: 2024-03-19

## 2024-03-19 NOTE — TELEPHONE ENCOUNTER
Reason for call:   [x] Refill   [] Prior Auth  [] Other:     Office:   [x] PCP/Provider -   [] Specialty/Provider -     Medication:     Carvedilol 25 mg tablet taken by mouth once daily #90 tabs     Atorvastatin 20 mg tablet taken by mouth once daily #90 tabs     Pharmacy:   [facility] -   [address]   [phone]     Does the patient have enough for 3 days?   [x] Yes   [] No - Send as HP to POD

## 2024-04-09 DIAGNOSIS — E11.42 TYPE 2 DIABETES MELLITUS WITH DIABETIC POLYNEUROPATHY, WITHOUT LONG-TERM CURRENT USE OF INSULIN (HCC): ICD-10-CM

## 2024-04-09 NOTE — TELEPHONE ENCOUNTER
NOT A DUPLICATE! Prescription sent to the wrong pharmacy!     Reason for call:   [x] Refill   [] Prior Auth  [] Other:     Office:   [x] PCP/Provider -   [] Specialty/Provider -     Medication: metFORMIN (GLUCOPHAGE) 850 mg tablet     Dose/Frequency: Take 1 tablet (850 mg total) by mouth 2 (two) times a day with meals     Quantity: 180    Pharmacy: [facility] - [address] [phone]       Does the patient have enough for 3 days?   [x] Yes   [] No - Send as HP to POD     Patient has been reassessed by physician and is ready for discharged.

## 2024-04-09 NOTE — TELEPHONE ENCOUNTER
Reason for call:   [x] Refill   [] Prior Auth  [] Other:     Office:   [x] PCP/Provider - London Mata MD   [] Specialty/Provider -     Medication: metFORMIN    Dose/Frequency: 850 mcg / 1 tab BID    Quantity: 180 tabs    Pharmacy: [facility] #[id number] - [place] - [address] [facility].      Does the patient have enough for 3 days?   [] Yes   [x] No - Send as HP to POD

## 2024-04-30 ENCOUNTER — APPOINTMENT (OUTPATIENT)
Dept: RADIOLOGY | Facility: MEDICAL CENTER | Age: 79
End: 2024-04-30
Payer: COMMERCIAL

## 2024-04-30 ENCOUNTER — OFFICE VISIT (OUTPATIENT)
Dept: FAMILY MEDICINE CLINIC | Facility: CLINIC | Age: 79
End: 2024-04-30
Payer: COMMERCIAL

## 2024-04-30 VITALS
SYSTOLIC BLOOD PRESSURE: 142 MMHG | WEIGHT: 240.5 LBS | HEART RATE: 51 BPM | OXYGEN SATURATION: 98 % | DIASTOLIC BLOOD PRESSURE: 64 MMHG | HEIGHT: 68 IN | BODY MASS INDEX: 36.45 KG/M2 | RESPIRATION RATE: 16 BRPM | TEMPERATURE: 97.4 F

## 2024-04-30 DIAGNOSIS — M25.551 PAIN OF RIGHT HIP: ICD-10-CM

## 2024-04-30 DIAGNOSIS — M51.36 LUMBAR DEGENERATIVE DISC DISEASE: Primary | ICD-10-CM

## 2024-04-30 DIAGNOSIS — M79.18 MYOFASCIAL PAIN SYNDROME: ICD-10-CM

## 2024-04-30 DIAGNOSIS — M51.36 LUMBAR DEGENERATIVE DISC DISEASE: ICD-10-CM

## 2024-04-30 PROCEDURE — 1159F MED LIST DOCD IN RCRD: CPT | Performed by: FAMILY MEDICINE

## 2024-04-30 PROCEDURE — 3288F FALL RISK ASSESSMENT DOCD: CPT | Performed by: FAMILY MEDICINE

## 2024-04-30 PROCEDURE — 96372 THER/PROPH/DIAG INJ SC/IM: CPT | Performed by: FAMILY MEDICINE

## 2024-04-30 PROCEDURE — 99213 OFFICE O/P EST LOW 20 MIN: CPT | Performed by: FAMILY MEDICINE

## 2024-04-30 PROCEDURE — 73502 X-RAY EXAM HIP UNI 2-3 VIEWS: CPT

## 2024-04-30 PROCEDURE — 20552 NJX 1/MLT TRIGGER POINT 1/2: CPT | Performed by: FAMILY MEDICINE

## 2024-04-30 PROCEDURE — 1100F PTFALLS ASSESS-DOCD GE2>/YR: CPT | Performed by: FAMILY MEDICINE

## 2024-04-30 PROCEDURE — 72110 X-RAY EXAM L-2 SPINE 4/>VWS: CPT

## 2024-04-30 PROCEDURE — 3725F SCREEN DEPRESSION PERFORMED: CPT | Performed by: FAMILY MEDICINE

## 2024-04-30 PROCEDURE — 3078F DIAST BP <80 MM HG: CPT | Performed by: FAMILY MEDICINE

## 2024-04-30 PROCEDURE — 3077F SYST BP >= 140 MM HG: CPT | Performed by: FAMILY MEDICINE

## 2024-04-30 NOTE — PROGRESS NOTES
Name: Poncho Villalobos Jr.      : 1945      MRN: [id number]  Encounter Provider: London Mata MD  Encounter Date: 2024   Encounter department: [facility]    Assessment & Plan     1. Lumbar degenerative disc disease  -     XR spine lumbar minimum 4 views non injury; Future; Expected date: 2024    2. Pain of right hip  -     XR hip/pelv 2-3 vws right if performed; Future; Expected date: 2024    3. Myofascial pain syndrome  -     Trigger Point Injection  -     sarapin injection 4 mL    Extra strength Tylenol 500 mg 2 tablets 3 times a day as needed for pain.  Avoid using riding mower for now.  X-rays of lumbar spine/right hip.  Advised to call if any changes.    A significant but separately identifiable additional service rendered during the encounter trigger point injections-see procedure note           Subjective     Several week history of intermittent sharp stabbing pain right lower back at times radiating to right groin area.  Symptoms started after mowing his lawn on a riding mower.  Pain does not radiate into right leg.  No right leg weakness or numbness.  No new bowel or bladder changes.  He has been using as needed Tylenol for pain.  Status post thoracic spine compression fracture from a fall 2023 CT scan abdomen and pelvis at that time showed degenerative changes of lumbar spine    Type 2 DM  on Metformin 850 mg BID.  Side effects on 1,000 mg BID. 2024  A1c 7.4. 2023 Urine albumin 139.5  on ARB. No hypoglycemic events. Mild neuropathy symptoms improved with Gabapentin. Lat eye exam 2018 no diabetic retinopathy.      Hypertension on current multi drug regimen 2023 creatinine 1.07. GFR 67.   Electrolytes normal . 2024 Hgb 12.6 . Prior Nephrology evaluation and workup for secondary causes of hypertension- metanephrine normal at 47. normetanephrine elevated at 183. renin 19.27. aldosterone 2.2. Renal artery dopplers 2015 no ISIAH. kidneys hypertrophic.  2.8 cm cyst left kidney.  Mixed hyperlipidemia and PAD on Atorvastatin 20 mg/day-side effects from 40 mg dose- and 2 fish oil capsules a day. 08/2023 Lipid profile cholesterol 110. TGs 180 decreased from 244. HDL 32. LDL 42. 11/2023 LFTs normal. 06/2022 TSH 1.980. 02/2020 CT scan abdomen and pelvis ordered by his surgeon for recurrent abdominal pain.  Incidental finding significant triple-vessel coronary artery calcification.  No pericardial effusion.  Scattered sub 3 mm central lobular calcified and noncalcified pulmonary nodules.  Atherosclerotic changes of aorta no aneurysm.         Review of Systems   Constitutional:  Negative for appetite change, chills and fever.   Gastrointestinal:  Negative for abdominal pain.   Genitourinary:  Negative for difficulty urinating.   Musculoskeletal:  Positive for back pain.   Skin:  Negative for rash.   Neurological:  Negative for weakness and numbness.       Past Medical History:   Diagnosis Date    Accelerated essential hypertension     LAST ASSESSED 8/27/15    Arthritis 01\01\11    Benign positional vertigo 08/05/2016    Cancer (HCC) Skin    Cataract, left eye 10/24/2017    Closed T8 spinal fracture (HCC)     COPD (chronic obstructive pulmonary disease) (HCC)     Diabetes mellitus (HCC)     Diverticulosis     GERD (gastroesophageal reflux disease)     Hypertension     Lumbar radiculopathy 12/07/2016    Polyp of colon 06/05/2017    Overview:  Added automatically from request for surgery [id number]    Prostatitis     LAST ASSESSED 3/17/15    Visual impairment 01\01\20     Past Surgical History:   Procedure Laterality Date    CARDIOVASCULAR STRESS TEST      EYE SURGERY       Family History   Problem Relation Age of Onset    Emphysema Father         LUNG    Stroke Father     COPD Father     Hearing loss Father     Hypertension Maternal Grandfather     Diabetes Maternal Grandmother     Hearing loss Paternal Grandfather     Drug abuse Brother      Social History     Socioeconomic  History    Marital status: /Civil Union     Spouse name: None    Number of children: None    Years of education: None    Highest education level: None   Occupational History    None   Tobacco Use    Smoking status: Former     Current packs/day: 0.00     Average packs/day: 2.0 packs/day for 30.0 years (60.0 ttl pk-yrs)     Types: Cigarettes     Start date:      Quit date: 2012     Years since quittin.3    Smokeless tobacco: Former     Types: Chew     Quit date: 2000   Vaping Use    Vaping status: Never Used   Substance and Sexual Activity    Alcohol use: No    Drug use: No    Sexual activity: Not Currently   Other Topics Concern    None   Social History Narrative    None     Social Determinants of Health     Financial Resource Strain: Low Risk  (2023)    Overall Financial Resource Strain (CARDIA)     Difficulty of Paying Living Expenses: Not very hard   Food Insecurity: Not on file   Transportation Needs: No Transportation Needs (2023)    PRAPARE - Transportation     Lack of Transportation (Medical): No     Lack of Transportation (Non-Medical): No   Physical Activity: Not on file   Stress: Not on file   Social Connections: Not on file   Intimate Partner Violence: Not on file   Housing Stability: Not on file     Current Outpatient Medications on File Prior to Visit   Medication Sig    amLODIPine (NORVASC) 10 mg tablet Take 1 tablet (10 mg total) by mouth daily    aspirin (ECOTRIN LOW STRENGTH) 81 mg EC tablet Take 81 mg by mouth daily    atorvastatin (LIPITOR) 20 mg tablet Take 1 tablet (20 mg total) by mouth daily    carvedilol (COREG) 25 mg tablet Take 1 tablet (25 mg total) by mouth 2 (two) times a day    chlorthalidone 25 mg tablet Take 1.5 tablets (37.5 mg total) by mouth daily    Cholecalciferol (VITAMIN D3) 1000 UNIT/SPRAY LIQD Take by mouth    Cyanocobalamin (VITAMIN B-12) 1000 MCG/15ML LIQD Take 1 tablet by mouth daily    Fluticasone-Salmeterol (Advair Diskus) 250-50 mcg/dose  inhaler Inhale 1 puff 2 (two) times a day (Patient taking differently: Inhale 1 puff in the morning)    Folic Acid 20 MG CAPS Take 1 capsule by mouth daily    gabapentin (NEURONTIN) 100 mg capsule Take 1 capsule (100 mg total) by mouth 3 (three) times a day    metFORMIN (GLUCOPHAGE) 850 mg tablet Take 1 tablet (850 mg total) by mouth 2 (two) times a day with meals    Omega-3 Fatty Acids (FISH OIL) 1,000 mg Take 1 capsule by mouth daily    omeprazole (PriLOSEC) 40 MG capsule Take 1 capsule by mouth 2 (two) times a day    polyethylene glycol-propylene glycol (SYSTANE) 0.4-0.3 % Apply to eye    Pyridoxine HCl (vitamin B-6) 25 MG tablet Take 25 mg by mouth daily    triamcinolone (KENALOG) 0.1 % cream Apply topically 2 (two) times a day    valsartan (DIOVAN) 320 MG tablet Take 1 tablet (320 mg total) by mouth daily    [DISCONTINUED] acetaminophen (TYLENOL) 325 mg tablet Take 3 tablets (975 mg total) by mouth every 8 (eight) hours (Patient not taking: Reported on 4/30/2024)     No Known Allergies  Immunization History   Administered Date(s) Administered    COVID-19 PFIZER VACCINE 0.3 ML IM 02/19/2021, 03/11/2021, 10/19/2021    COVID-19 Pfizer Vac BIVALENT Roger-sucrose 12 Yr+ IM 10/08/2022    COVID-19 Pfizer mRNA vacc PF roger-sucrose 12 yr and older (Comirnaty) 11/12/2023    COVID-19 Pfizer vac (Roger-sucrose, gray cap) 12 yr+ IM 05/05/2022    H1N1, All Formulations 02/09/2010    INFLUENZA 11/05/2019    Influenza Split High Dose Preservative Free IM 09/23/2013, 10/01/2014, 11/15/2016, 10/24/2017, 11/05/2019    Influenza, high dose seasonal 0.7 mL 11/19/2018, 11/05/2019, 08/24/2020, 09/22/2021, 11/03/2023    Influenza, seasonal, injectable 09/06/2011, 09/11/2012    Pneumococcal Conjugate 13-Valent 10/12/2015    Pneumococcal Conjugate Vaccine 20-valent (Pcv20), Polysace 11/30/2023    Pneumococcal Polysaccharide PPV23 09/06/2011, 11/15/2016    Respiratory Syncytial Virus Vaccine (Recombinant, Adjuvanted) 02/10/2024    Zoster  "Vaccine Recombinant 02/18/2024       Objective     /64 (BP Location: Left arm, Patient Position: Sitting, Cuff Size: Large)   Pulse (!) 51   Temp (!) 97.4 °F (36.3 °C) (Temporal)   Resp 16   Ht 5' 8\" (1.727 m)   Wt 109 kg (240 lb 8 oz)   SpO2 98%   BMI 36.57 kg/m²      BP Readings from Last 3 Encounters:   04/30/24 142/64   02/15/24 132/78   02/09/24 126/82     Wt Readings from Last 3 Encounters:   04/30/24 109 kg (240 lb 8 oz)   02/15/24 111 kg (244 lb)   02/09/24 112 kg (247 lb 12.8 oz)             Physical Exam  Constitutional:       General: He is not in acute distress.  Musculoskeletal:      Thoracic back: No bony tenderness.      Lumbar back: Bony tenderness present. No spasms or tenderness. Negative right straight leg raise test and negative left straight leg raise test.      Right hip: No tenderness or bony tenderness. Decreased range of motion. Normal strength.      Right lower leg: No edema.      Left lower leg: No edema.      Comments: Tenderness over right SI area.    Restricted range of motion of right hip with internal rotation.  No reproducible pain   Neurological:      Mental Status: He is alert.      Motor: No weakness.      Deep Tendon Reflexes: Reflexes abnormal.      Reflex Scores:       Patellar reflexes are 1+ on the right side and 1+ on the left side.       Achilles reflexes are 0 on the right side and 0 on the left side.     Comments: No lower extremity weakness.  Dorsiflexion/plantarflexion normal.    Negative straight leg raise test on right        Universal Protocol:  Consent: Verbal consent obtained.  Risks and benefits: risks, benefits and alternatives were discussed  Consent given by: patient  Supporting Documentation  Indications: pain (Myofascial pain syndrome)   Trigger Point Injections: single/multiple trigger point(s): 1-2 muscle groups   Procedure Details  Location(s):    Lower Back: R lumbar paraspinals     Hip/Pelvis: R sacroiliac     Prep: patient was prepped and " draped in usual sterile fashion  Needle size: 27 G  Patient tolerance: patient tolerated the procedure well with no immediate complications  Additional procedure details: Using aseptic technique I injected a total of 2 trigger points. Sarapin 4 ML and Lidocaine 1% 2 ML             London Mata MD

## 2024-05-06 ENCOUNTER — TELEPHONE (OUTPATIENT)
Age: 79
End: 2024-05-06

## 2024-05-06 NOTE — TELEPHONE ENCOUNTER
Pt called looking for results of his xrays from 04/30/24.  Pt was informed results are not processed yet.

## 2024-05-13 DIAGNOSIS — I10 ESSENTIAL HYPERTENSION: ICD-10-CM

## 2024-05-13 NOTE — TELEPHONE ENCOUNTER
Reason for call:   [x] Refill   [] Prior Auth  [] Other:     Office:   [x] PCP/Provider - [facility]   [] Specialty/Provider -     Medication:   chlorthalidone 25 mg tablet - Take 1.5 tablets (37.5 mg total) by mouth daily     Pharmacy:   [facility] - [address]     Does the patient have enough for 3 days?   [x] Yes   [] No - Send as HP to POD

## 2024-05-14 RX ORDER — CHLORTHALIDONE 25 MG/1
37.5 TABLET ORAL DAILY
Qty: 135 TABLET | Refills: 1 | Status: SHIPPED | OUTPATIENT
Start: 2024-05-14

## 2024-05-20 DIAGNOSIS — J44.9 CHRONIC OBSTRUCTIVE PULMONARY DISEASE, UNSPECIFIED COPD TYPE (HCC): ICD-10-CM

## 2024-05-20 NOTE — TELEPHONE ENCOUNTER
Reason for call:   [x] Refill   [] Prior Auth  [] Other:     Office:   [x] PCP/Provider - [facility]  [] Specialty/Provider -     Medication: Fluticasone-Salmeterol (Advair Diskus) 250-50 mcg/dose inhaler      Dose/Frequency: Inhale 1 puff 2 (two) times a day     Quantity: 180    Pharmacy: [facility] - [address]      Does the patient have enough for 3 days?   [x] Yes   [] No - Send as HP to POD

## 2024-05-21 RX ORDER — FLUTICASONE PROPIONATE AND SALMETEROL 250; 50 UG/1; UG/1
1 POWDER RESPIRATORY (INHALATION) 2 TIMES DAILY
Qty: 180 BLISTER | Refills: 1 | Status: SHIPPED | OUTPATIENT
Start: 2024-05-21

## 2024-05-21 NOTE — TELEPHONE ENCOUNTER
Received message below from patient Martin Rincon was in the ER yesterday, he fainted on Sunday and hurt his back. Wants to follow up with Ingrid Savage to discuss him imaging tests he had done in the ER. Attempted to reach the office to better accommodate him with finding an appt time. Patient cannot come in this afternoon.  Please advise       He called back and I told him I would get back with him with appointment Joselo

## 2024-06-20 DIAGNOSIS — I10 ESSENTIAL HYPERTENSION: ICD-10-CM

## 2024-06-20 DIAGNOSIS — E78.2 MIXED HYPERLIPIDEMIA: ICD-10-CM

## 2024-06-20 RX ORDER — CARVEDILOL 25 MG/1
25 TABLET ORAL 2 TIMES DAILY
Qty: 180 TABLET | Refills: 1 | Status: SHIPPED | OUTPATIENT
Start: 2024-06-20

## 2024-06-20 RX ORDER — ATORVASTATIN CALCIUM 20 MG/1
20 TABLET, FILM COATED ORAL DAILY
Qty: 90 TABLET | Refills: 1 | Status: SHIPPED | OUTPATIENT
Start: 2024-06-20

## 2024-06-20 RX ORDER — VALSARTAN 320 MG/1
320 TABLET ORAL DAILY
Qty: 90 TABLET | Refills: 0 | Status: CANCELLED | OUTPATIENT
Start: 2024-06-20

## 2024-07-22 DIAGNOSIS — S22.069A CLOSED FRACTURE OF EIGHTH THORACIC VERTEBRA, UNSPECIFIED FRACTURE MORPHOLOGY, INITIAL ENCOUNTER (HCC): ICD-10-CM

## 2024-07-22 NOTE — TELEPHONE ENCOUNTER
Reason for call:   [x] Refill   [] Prior Auth  [] Other:     Office:   [x] PCP/Provider - [facility]  [] Specialty/Provider -     Medication: Gabapentin    Dose/Frequency: 100 mg TID    Quantity: 60    Pharmacy: [facility]    Does the patient have enough for 3 days?   [x] Yes   [] No - Send as HP to POD

## 2024-07-23 RX ORDER — GABAPENTIN 100 MG/1
100 CAPSULE ORAL 3 TIMES DAILY
Qty: 90 CAPSULE | Refills: 5 | Status: SHIPPED | OUTPATIENT
Start: 2024-07-23

## 2024-08-06 DIAGNOSIS — I10 ESSENTIAL HYPERTENSION: ICD-10-CM

## 2024-08-07 RX ORDER — AMLODIPINE BESYLATE 10 MG/1
10 TABLET ORAL DAILY
Qty: 90 TABLET | Refills: 1 | Status: SHIPPED | OUTPATIENT
Start: 2024-08-07

## 2024-09-13 ENCOUNTER — TELEPHONE (OUTPATIENT)
Dept: FAMILY MEDICINE CLINIC | Facility: CLINIC | Age: 79
End: 2024-09-13

## 2024-09-13 NOTE — TELEPHONE ENCOUNTER
Left message for patient to call office back to reschedule 10/24/2024 appointment due to the providers being out of office.

## 2024-09-16 DIAGNOSIS — I10 ESSENTIAL HYPERTENSION: ICD-10-CM

## 2024-09-16 NOTE — TELEPHONE ENCOUNTER
Reason for call:   [x] Refill   [] Prior Auth  [] Other:     Office:   [x] PCP/Provider -   [] Specialty/Provider -     Medication: valsartan (DIOVAN) 320 MG tablet Take 1 tablet (320 mg total) by mouth daily,       Pharmacy: [facility] [address] [place] [address]     Does the patient have enough for 3 days?   [x] Yes   [] No - Send as HP to POD

## 2024-09-17 RX ORDER — VALSARTAN 320 MG/1
320 TABLET ORAL DAILY
Qty: 90 TABLET | Refills: 1 | Status: SHIPPED | OUTPATIENT
Start: 2024-09-17

## 2024-09-17 NOTE — TELEPHONE ENCOUNTER
Carlos Eduardo returned call. He states he never received a voicemail.   Patient was dissappointed - this is the second time we have cancelled this appointment and he needs to be seen for his 6 month that is overdue. He has been rescheduled for 10/8.

## 2024-10-04 ENCOUNTER — RA CDI HCC (OUTPATIENT)
Dept: OTHER | Facility: HOSPITAL | Age: 79
End: 2024-10-04

## 2024-10-04 NOTE — PROGRESS NOTES
HCC coding opportunities          Chart Reviewed number of suggestions sent to Provider: 1     Patients Insurance     Medicare Insurance: [employer]          E11.51: Type 2 diabetes mellitus with diabetic peripheral angiopathy without gangrene (HCC) [265485]     DM & PVD are presumed to have a causal-effect relationship unless documented as unrelated

## 2024-10-08 ENCOUNTER — OFFICE VISIT (OUTPATIENT)
Dept: FAMILY MEDICINE CLINIC | Facility: CLINIC | Age: 79
End: 2024-10-08
Payer: COMMERCIAL

## 2024-10-08 VITALS
HEART RATE: 76 BPM | HEIGHT: 68 IN | SYSTOLIC BLOOD PRESSURE: 132 MMHG | BODY MASS INDEX: 36.45 KG/M2 | DIASTOLIC BLOOD PRESSURE: 68 MMHG | OXYGEN SATURATION: 98 % | TEMPERATURE: 97.9 F | RESPIRATION RATE: 18 BRPM | WEIGHT: 240.5 LBS

## 2024-10-08 DIAGNOSIS — E78.2 MIXED HYPERLIPIDEMIA: ICD-10-CM

## 2024-10-08 DIAGNOSIS — E11.29 TYPE 2 DIABETES MELLITUS WITH MICROALBUMINURIA, WITHOUT LONG-TERM CURRENT USE OF INSULIN (HCC): Primary | ICD-10-CM

## 2024-10-08 DIAGNOSIS — K21.9 GASTROESOPHAGEAL REFLUX DISEASE WITHOUT ESOPHAGITIS: ICD-10-CM

## 2024-10-08 DIAGNOSIS — R07.89 LEFT-SIDED CHEST WALL PAIN: ICD-10-CM

## 2024-10-08 DIAGNOSIS — Z00.00 MEDICARE ANNUAL WELLNESS VISIT, SUBSEQUENT: ICD-10-CM

## 2024-10-08 DIAGNOSIS — E11.42 TYPE 2 DIABETES MELLITUS WITH DIABETIC POLYNEUROPATHY, WITHOUT LONG-TERM CURRENT USE OF INSULIN (HCC): ICD-10-CM

## 2024-10-08 DIAGNOSIS — I73.9 PAD (PERIPHERAL ARTERY DISEASE) (HCC): ICD-10-CM

## 2024-10-08 DIAGNOSIS — E11.42 DIABETIC POLYNEUROPATHY ASSOCIATED WITH TYPE 2 DIABETES MELLITUS (HCC): ICD-10-CM

## 2024-10-08 DIAGNOSIS — I10 PRIMARY HYPERTENSION: ICD-10-CM

## 2024-10-08 DIAGNOSIS — G47.33 OSA (OBSTRUCTIVE SLEEP APNEA): ICD-10-CM

## 2024-10-08 DIAGNOSIS — J44.9 CHRONIC OBSTRUCTIVE PULMONARY DISEASE, UNSPECIFIED COPD TYPE (HCC): ICD-10-CM

## 2024-10-08 DIAGNOSIS — K76.0 FATTY LIVER: ICD-10-CM

## 2024-10-08 DIAGNOSIS — R80.9 TYPE 2 DIABETES MELLITUS WITH MICROALBUMINURIA, WITHOUT LONG-TERM CURRENT USE OF INSULIN (HCC): Primary | ICD-10-CM

## 2024-10-08 PROCEDURE — G0439 PPPS, SUBSEQ VISIT: HCPCS | Performed by: FAMILY MEDICINE

## 2024-10-08 PROCEDURE — 99214 OFFICE O/P EST MOD 30 MIN: CPT | Performed by: FAMILY MEDICINE

## 2024-10-08 NOTE — ASSESSMENT & PLAN NOTE
01/2018 abdominal ultrasound normal except for mild diffuse fatty infiltration of liver.    Lab Results   Component Value Date    ALT 26 11/01/2023    AST 19 11/01/2023    ALKPHOS 50 11/01/2023    BILITOT 0.42 02/27/2015          
  Blood pressures have been stable on current multi drug regimen.  01/2024 creatinine 1.07. GFR 67.   Electrolytes normal. Hgb 12.6 . Prior Nephrology evaluation and workup for secondary causes of hypertension- metanephrine normal at 47. normetanephrine elevated at 183. renin 19.27. aldosterone 2.2. Renal artery dopplers 03/2015 no ISIAH. kidneys hypertrophic. 2.8 cm cyst left kidney.        BP Readings from Last 3 Encounters:   10/08/24 132/68   04/30/24 142/64   02/15/24 132/78           Lab Results   Component Value Date    WBC 6.34 01/11/2024    HGB 12.6 01/11/2024    HCT 38.6 01/11/2024    MCV 85 01/11/2024     01/11/2024     Lab Results   Component Value Date    SODIUM 137 11/01/2023    K 3.9 11/01/2023     11/01/2023    CO2 24 11/01/2023    BUN 28 (H) 11/01/2023    CREATININE 1.07 11/01/2023    GLUC 215 (H) 11/01/2023    CALCIUM 9.5 11/01/2023                
  COPD on Advair 250/50 once a day    Change Advair 250/50 to BID          
  FIOR/CPAP usage. FIOR on CPAP with auto adjustment feature.  He is currently benefiting from his current use of CPAP with restorative sleep and no excessive daytime tiredness          
  GERD/Kessler's stable on Omeprazole. No dysphagia. Repeat EGD 06/2023.         
  Lab Results   Component Value Date    HGBA1C 7.4 (H) 01/11/2024            
  On Atorvastatin 20 mg/day-side effects from 40 mg dose- and 2 fish oil capsules a day    02/2020 CT scan abdomen and pelvis ordered by his surgeon for recurrent abdominal pain.  Incidental finding significant triple-vessel coronary artery calcification    Lab Results   Component Value Date    CHOLESTEROL 110 08/31/2023    CHOLESTEROL 118 07/06/2023    CHOLESTEROL 123 12/27/2022     Lab Results   Component Value Date    HDL 32 (L) 08/31/2023    HDL 33 (L) 07/06/2023    HDL 33 (L) 12/27/2022     Lab Results   Component Value Date    TRIG 180 (H) 08/31/2023    TRIG 199 (H) 07/06/2023    TRIG 244 (H) 12/27/2022     Lab Results   Component Value Date    LDLCALC 42 08/31/2023            Orders:    TSH, 3rd generation with Free T4 reflex; Future    
  Orders:    TSH, 3rd generation with Free T4 reflex; Future    
  PAD with intermittent leg claudications.  Pain left hip and calf pain after walking long distances. No rest pain.     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     11/2016 arterial dopplers LEs.Diffuse heterogeneous plaque throughout both legs. Evidence of right lower extremity occlusive disease in mid SFA. aortoiliac study normal but limited views of proximal common iliac arteries due to body habitus          
  Type 2 DM  on Metformin 850 mg BID.  Side effects on 1,000 mg BID. 01/2024  A1c 7.4. 08/2023 Urine albumin 139.5  on ARB. No hypoglycemic events. +DPN symptoms on Gabapentin 300 mg 3x/day and vitamin supplement. Lat eye exam 08/2018 no diabetic retinopathy.        Lab Results   Component Value Date    HGBA1C 7.4 (H) 01/11/2024       Continue with current medications.  Repeat labs        
normal

## 2024-10-08 NOTE — PATIENT INSTRUCTIONS
Medicare Preventive Visit Patient Instructions  Thank you for completing your Welcome to Medicare Visit or Medicare Annual Wellness Visit today. Your next wellness visit will be due in one year (10/9/2025).  The screening/preventive services that you may require over the next 5-10 years are detailed below. Some tests may not apply to you based off risk factors and/or age. Screening tests ordered at today's visit but not completed yet may show as past due. Also, please note that scanned in results may not display below.  Preventive Screenings:  Service Recommendations Previous Testing/Comments   Colorectal Cancer Screening  Colonoscopy    Fecal Occult Blood Test (FOBT)/Fecal Immunochemical Test (FIT)  Fecal DNA/Cologuard Test  Flexible Sigmoidoscopy Age: 45-75 years old   Colonoscopy: every 10 years (May be performed more frequently if at higher risk)  OR  FOBT/FIT: every 1 year  OR  Cologuard: every 3 years  OR  Sigmoidoscopy: every 5 years  Screening may be recommended earlier than age 45 if at higher risk for colorectal cancer. Also, an individualized decision between you and your healthcare provider will decide whether screening between the ages of 76-85 would be appropriate. Colonoscopy: Not on file  FOBT/FIT: Not on file  Cologuard: Not on file  Sigmoidoscopy: Not on file          Prostate Cancer Screening Individualized decision between patient and health care provider in men between ages of 55-69   Medicare will cover every 12 months beginning on the day after your 50th birthday PSA: 1.9 ng/mL           Hepatitis C Screening Once for adults born between 1945 and 1965  More frequently in patients at high risk for Hepatitis C Hep C Antibody: Not on file        Diabetes Screening 1-2 times per year if you're at risk for diabetes or have pre-diabetes Fasting glucose: 144 mg/dL (8/31/2023)  A1C: 7.4 % (1/11/2024)      Cholesterol Screening Once every 5 years if you don't have a lipid disorder. May order more often  based on risk factors. Lipid panel: 08/31/2023         Other Preventive Screenings Covered by Medicare:  Abdominal Aortic Aneurysm (AAA) Screening: covered once if your at risk. You're considered to be at risk if you have a family history of AAA or a male between the age of 65-75 who smoking at least 100 cigarettes in your lifetime.  Lung Cancer Screening: covers low dose CT scan once per year if you meet all of the following conditions: (1) Age 55-77; (2) No signs or symptoms of lung cancer; (3) Current smoker or have quit smoking within the last 15 years; (4) You have a tobacco smoking history of at least 20 pack years (packs per day x number of years you smoked); (5) You get a written order from a healthcare provider.  Glaucoma Screening: covered annually if you're considered high risk: (1) You have diabetes OR (2) Family history of glaucoma OR (3)  aged 50 and older OR (4)  American aged 65 and older  Osteoporosis Screening: covered every 2 years if you meet one of the following conditions: (1) Have a vertebral abnormality; (2) On glucocorticoid therapy for more than 3 months; (3) Have primary hyperparathyroidism; (4) On osteoporosis medications and need to assess response to drug therapy.  HIV Screening: covered annually if you're between the age of 15-65. Also covered annually if you are younger than 15 and older than 65 with risk factors for HIV infection. For pregnant patients, it is covered up to 3 times per pregnancy.    Immunizations:  Immunization Recommendations   Influenza Vaccine Annual influenza vaccination during flu season is recommended for all persons aged >= 6 months who do not have contraindications   Pneumococcal Vaccine   * Pneumococcal conjugate vaccine = PCV13 (Prevnar 13), PCV15 (Vaxneuvance), PCV20 (Prevnar 20)  * Pneumococcal polysaccharide vaccine = PPSV23 (Pneumovax) Adults 19-65 yo with certain risk factors or if 65+ yo  If never received any pneumonia vaccine:  recommend Prevnar 20 (PCV20)  Give PCV20 if previously received 1 dose of PCV13 or PPSV23   Hepatitis B Vaccine 3 dose series if at intermediate or high risk (ex: diabetes, end stage renal disease, liver disease)   Respiratory syncytial virus (RSV) Vaccine - COVERED BY MEDICARE PART D  * RSVPreF3 (Arexvy) CDC recommends that adults 60 years of age and older may receive a single dose of RSV vaccine using shared clinical decision-making (SCDM)   Tetanus (Td) Vaccine - COST NOT COVERED BY MEDICARE PART B Following completion of primary series, a booster dose should be given every 10 years to maintain immunity against tetanus. Td may also be given as tetanus wound prophylaxis.   Tdap Vaccine - COST NOT COVERED BY MEDICARE PART B Recommended at least once for all adults. For pregnant patients, recommended with each pregnancy.   Shingles Vaccine (Shingrix) - COST NOT COVERED BY MEDICARE PART B  2 shot series recommended in those 19 years and older who have or will have weakened immune systems or those 50 years and older     Health Maintenance Due:      Topic Date Due   • Lung Cancer Screening  11/01/2024   • Hepatitis C Screening  Addressed   • Colorectal Cancer Screening  Discontinued     Immunizations Due:  There are no preventive care reminders to display for this patient.  Advance Directives   What are advance directives?  Advance directives are legal documents that state your wishes and plans for medical care. These plans are made ahead of time in case you lose your ability to make decisions for yourself. Advance directives can apply to any medical decision, such as the treatments you want, and if you want to donate organs.   What are the types of advance directives?  There are many types of advance directives, and each state has rules about how to use them. You may choose a combination of any of the following:  Living will:  This is a written record of the treatment you want. You can also choose which treatments you  do not want, which to limit, and which to stop at a certain time. This includes surgery, medicine, IV fluid, and tube feedings.   Durable power of  for healthcare (DPAHC):  This is a written record that states who you want to make healthcare choices for you when you are unable to make them for yourself. This person, called a proxy, is usually a family member or a friend. You may choose more than 1 proxy.  Do not resuscitate (DNR) order:  A DNR order is used in case your heart stops beating or you stop breathing. It is a request not to have certain forms of treatment, such as CPR. A DNR order may be included in other types of advance directives.  Medical directive:  This covers the care that you want if you are in a coma, near death, or unable to make decisions for yourself. You can list the treatments you want for each condition. Treatment may include pain medicine, surgery, blood transfusions, dialysis, IV or tube feedings, and a ventilator (breathing machine).  Values history:  This document has questions about your views, beliefs, and how you feel and think about life. This information can help others choose the care that you would choose.  Why are advance directives important?  An advance directive helps you control your care. Although spoken wishes may be used, it is better to have your wishes written down. Spoken wishes can be misunderstood, or not followed. Treatments may be given even if you do not want them. An advance directive may make it easier for your family to make difficult choices about your care.   Weight Management   Why it is important to manage your weight:  Being overweight increases your risk of health conditions such as heart disease, high blood pressure, type 2 diabetes, and certain types of cancer. It can also increase your risk for osteoarthritis, sleep apnea, and other respiratory problems. Aim for a slow, steady weight loss. Even a small amount of weight loss can lower your risk  of health problems.  How to lose weight safely:  A safe and healthy way to lose weight is to eat fewer calories and get regular exercise. You can lose up about 1 pound a week by decreasing the number of calories you eat by 500 calories each day.   Healthy meal plan for weight management:  A healthy meal plan includes a variety of foods, contains fewer calories, and helps you stay healthy. A healthy meal plan includes the following:  Eat whole-grain foods more often.  A healthy meal plan should contain fiber. Fiber is the part of grains, fruits, and vegetables that is not broken down by your body. Whole-grain foods are healthy and provide extra fiber in your diet. Some examples of whole-grain foods are whole-wheat breads and pastas, oatmeal, brown rice, and bulgur.  Eat a variety of vegetables every day.  Include dark, leafy greens such as spinach, kale, ervin greens, and mustard greens. Eat yellow and orange vegetables such as carrots, sweet potatoes, and winter squash.   Eat a variety of fruits every day.  Choose fresh or canned fruit (canned in its own juice or light syrup) instead of juice. Fruit juice has very little or no fiber.  Eat low-fat dairy foods.  Drink fat-free (skim) milk or 1% milk. Eat fat-free yogurt and low-fat cottage cheese. Try low-fat cheeses such as mozzarella and other reduced-fat cheeses.  Choose meat and other protein foods that are low in fat.  Choose beans or other legumes such as split peas or lentils. Choose fish, skinless poultry (chicken or turkey), or lean cuts of red meat (beef or pork). Before you cook meat or poultry, cut off any visible fat.   Use less fat and oil.  Try baking foods instead of frying them. Add less fat, such as margarine, sour cream, regular salad dressing and mayonnaise to foods. Eat fewer high-fat foods. Some examples of high-fat foods include french fries, doughnuts, ice cream, and cakes.  Eat fewer sweets.  Limit foods and drinks that are high in sugar.  This includes candy, cookies, regular soda, and sweetened drinks.  Exercise:  Exercise at least 30 minutes per day on most days of the week. Some examples of exercise include walking, biking, dancing, and swimming. You can also fit in more physical activity by taking the stairs instead of the elevator or parking farther away from stores. Ask your healthcare provider about the best exercise plan for you.      © Copyright [employer] 2018 Information is for End User's use only and may not be sold, redistributed or otherwise used for commercial purposes. All illustrations and images included in CareNotes® are the copyrighted property of [employer] Inc[employer] or [employer]

## 2024-10-08 NOTE — PROGRESS NOTES
Ambulatory Visit  Name: Poncho Villalobos Jr.      : 1945      MRN: [id number]  Encounter Provider: London Mata MD  Encounter Date: 10/8/2024   Encounter department: [facility]    Assessment & Plan  Type 2 diabetes mellitus with microalbuminuria, without long-term current use of insulin (HCC)    Lab Results   Component Value Date    HGBA1C 7.4 (H) 2024            Type 2 diabetes mellitus with diabetic polyneuropathy, without long-term current use of insulin (HCC)    Lab Results   Component Value Date    HGBA1C 7.4 (H) 2024       Orders:    metFORMIN (GLUCOPHAGE) 850 mg tablet; Take 1 tablet (850 mg total) by mouth 2 (two) times a day with meals    Diabetic polyneuropathy associated with type 2 diabetes mellitus ([facility])    Lab Results   Component Value Date    HGBA1C 7.4 (H) 2024            Mixed hyperlipidemia    Orders:    TSH, 3rd generation with Free T4 reflex; Future    Primary hypertension         PAD (peripheral artery disease) ([facility])         Left-sided chest wall pain    Orders:    XR chest pa and lateral; Future    FIOR (obstructive sleep apnea)         Chronic obstructive pulmonary disease, unspecified COPD type ([facility])         Fatty liver         Gastroesophageal reflux disease without esophagitis         Medicare annual wellness visit, subsequent           Depression Screening and Follow-up Plan: Patient was screened for depression during today's encounter. They screened negative with a PHQ-2 score of 0.    Falls Plan of Care: balance, strength, and gait training instructions were provided.       Preventive health issues were discussed with patient, and age appropriate screening tests were ordered as noted in patient's After Visit Summary. Personalized health advice and appropriate referrals for health education or preventive services given if needed, as noted in patient's After Visit Summary.    History of Present Illness     HPI   Patient Care Team:  London Mata MD  as PCP - General  London Mata MD as PCP - PCP-[facility] Aristides-MD KAYLAN Dominguez MD (General Surgery)    Review of Systems  Medical History Reviewed by provider this encounter:       Annual Wellness Visit Questionnaire   Poncho is here for his Subsequent Wellness visit. Last Medicare Wellness visit information reviewed, patient interviewed and updates made to the record today.      Health Risk Assessment:   Patient rates overall health as good. Patient feels that their physical health rating is slightly worse. Patient is satisfied with their life. Eyesight was rated as slightly worse. Hearing was rated as slightly worse. Patient feels that their emotional and mental health rating is same. Patients states they are never, rarely angry. Patient states they are sometimes unusually tired/fatigued. Pain experienced in the last 7 days has been some. Patient's pain rating has been 6/10. Patient states that he has experienced no weight loss or gain in last 6 months.     Depression Screening:   PHQ-2 Score: 0      Fall Risk Screening:   In the past year, patient has experienced: no history of falling in past year      Home Safety:  Patient does not have trouble with stairs inside or outside of their home. Patient has working smoke alarms and has no working carbon monoxide detector. Home safety hazards include: none.     Nutrition:   Current diet is Diabetic.     Medications:   Patient is currently taking over-the-counter supplements. OTC medications include: see medication list. Patient is able to manage medications.     Activities of Daily Living (ADLs)/Instrumental Activities of Daily Living (IADLs):   Walk and transfer into and out of bed and chair?: Yes  Dress and groom yourself?: Yes    Bathe or shower yourself?: Yes    Feed yourself? Yes  Do your laundry/housekeeping?: Yes  Manage your money, pay your bills and track your expenses?: Yes  Make your own meals?: Yes    Do your own  shopping?: Yes    Previous Hospitalizations:   Any hospitalizations or ED visits within the last 12 months?: Yes    How many hospitalizations have you had in the last year?: 1-2    Advance Care Planning:   Living will: No    Advanced directive: No      Cognitive Screening:   Provider or family/friend/caregiver concerned regarding cognition?: No    PREVENTIVE SCREENINGS      Cardiovascular Screening:    General: History Lipid Disorder    Due for: Lipid Panel      Diabetes Screening:     General: Screening Not Indicated and History Diabetes      Colorectal Cancer Screening:     General: Screening Current      Prostate Cancer Screening:    General: Screening Not Indicated      Osteoporosis Screening:    General: Screening Not Indicated      Abdominal Aortic Aneurysm (AAA) Screening:    Risk factors include: tobacco use        General: Screening Current      Lung Cancer Screening:     General: Screening Current      Hepatitis C Screening:    General: Screening Current    Screening, Brief Intervention, and Referral to Treatment (SBIRT)    Screening  Typical number of drinks in a day: 0  Typical number of drinks in a week: 0  Interpretation: Low risk drinking behavior.    Single Item Drug Screening:  How often have you used an illegal drug (including marijuana) or a prescription medication for non-medical reasons in the past year? never    Single Item Drug Screen Score: 0  Interpretation: Negative screen for possible drug use disorder    Brief Intervention  Alcohol & drug use screenings were reviewed. No concerns regarding substance use disorder identified.     Other Counseling Topics:   Calcium and vitamin D intake and regular weightbearing exercise.     Social Determinants of Health     Financial Resource Strain: Low Risk  (7/6/2023)    Overall Financial Resource Strain (CARDIA)     Difficulty of Paying Living Expenses: Not very hard   Food Insecurity: No Food Insecurity (10/8/2024)    Hunger Vital Sign     Worried  "About Running Out of Food in the Last Year: Never true     Ran Out of Food in the Last Year: Never true   Transportation Needs: No Transportation Needs (10/8/2024)    PRAPARE - Transportation     Lack of Transportation (Medical): No     Lack of Transportation (Non-Medical): No   Housing Stability: Low Risk  (10/8/2024)    Housing Stability Vital Sign     Unable to Pay for Housing in the Last Year: No     Number of Times Moved in the Last Year: 1     Homeless in the Last Year: No   Utilities: Not At Risk (10/8/2024)    [facility] Utilities     Threatened with loss of utilities: No         Objective     /68 (BP Location: Left arm, Patient Position: Sitting, Cuff Size: Large)   Pulse 76   Temp 97.9 °F (36.6 °C) (Temporal)   Resp 18   Ht 5' 8\" (1.727 m)   Wt 109 kg (240 lb 8 oz)   SpO2 98%   BMI 36.57 kg/m²     Physical Exam    "

## 2024-10-08 NOTE — PROGRESS NOTES
Ambulatory Visit  Name: Poncho Villalobos Jr.      : 1945      MRN: [id number]  Encounter Provider: London Mata MD  Encounter Date: 10/8/2024   Encounter department: [facility]    Assessment & Plan  Type 2 diabetes mellitus with microalbuminuria, without long-term current use of insulin (HCC)    Type 2 DM  on Metformin 850 mg BID.  Side effects on 1,000 mg BID. 2024  A1c 7.4. 2023 Urine albumin 139.5  on ARB. No hypoglycemic events. +DPN symptoms on Gabapentin 300 mg 3x/day and vitamin supplement. Lat eye exam 2018 no diabetic retinopathy.        Lab Results   Component Value Date    HGBA1C 7.4 (H) 2024       Continue with current medications.  Repeat labs        Type 2 diabetes mellitus with diabetic polyneuropathy, without long-term current use of insulin (HCC)    Lab Results   Component Value Date    HGBA1C 7.4 (H) 2024       Orders:    metFORMIN (GLUCOPHAGE) 850 mg tablet; Take 1 tablet (850 mg total) by mouth 2 (two) times a day with meals    Diabetic polyneuropathy associated with type 2 diabetes mellitus (HCC)    Lab Results   Component Value Date    HGBA1C 7.4 (H) 2024            Mixed hyperlipidemia    On Atorvastatin 20 mg/day-side effects from 40 mg dose- and 2 fish oil capsules a day    2020 CT scan abdomen and pelvis ordered by his surgeon for recurrent abdominal pain.  Incidental finding significant triple-vessel coronary artery calcification    Lab Results   Component Value Date    CHOLESTEROL 110 2023    CHOLESTEROL 118 2023    CHOLESTEROL 123 2022     Lab Results   Component Value Date    HDL 32 (L) 2023    HDL 33 (L) 2023    HDL 33 (L) 2022     Lab Results   Component Value Date    TRIG 180 (H) 2023    TRIG 199 (H) 2023    TRIG 244 (H) 2022     Lab Results   Component Value Date    LDLCALC 42 2023            Orders:    TSH, 3rd generation with Free T4 reflex; Future    Primary  hypertension    Blood pressures have been stable on current multi drug regimen.  01/2024 creatinine 1.07. GFR 67.   Electrolytes normal. Hgb 12.6 . Prior Nephrology evaluation and workup for secondary causes of hypertension- metanephrine normal at 47. normetanephrine elevated at 183. renin 19.27. aldosterone 2.2. Renal artery dopplers 03/2015 no ISIAH. kidneys hypertrophic. 2.8 cm cyst left kidney.        BP Readings from Last 3 Encounters:   10/08/24 132/68   04/30/24 142/64   02/15/24 132/78           Lab Results   Component Value Date    WBC 6.34 01/11/2024    HGB 12.6 01/11/2024    HCT 38.6 01/11/2024    MCV 85 01/11/2024     01/11/2024     Lab Results   Component Value Date    SODIUM 137 11/01/2023    K 3.9 11/01/2023     11/01/2023    CO2 24 11/01/2023    BUN 28 (H) 11/01/2023    CREATININE 1.07 11/01/2023    GLUC 215 (H) 11/01/2023    CALCIUM 9.5 11/01/2023                PAD (peripheral artery disease) (HCC)    PAD with intermittent leg claudications.  Pain left hip and calf pain after walking long distances. No rest pain.     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     11/2016 arterial dopplers LEs.Diffuse heterogeneous plaque throughout both legs. Evidence of right lower extremity occlusive disease in mid SFA. aortoiliac study normal but limited views of proximal common iliac arteries due to body habitus          Left-sided chest wall pain    Exertional intermittent pain left lower chest wall worse with twisting and bending.  Not worse with exertion deep breath.    Orders:    XR chest pa and lateral; Future    FIOR (obstructive sleep apnea)    FIOR/CPAP usage. FIOR on CPAP with auto adjustment feature.  He is currently benefiting from his current use of CPAP with restorative sleep and no excessive daytime tiredness          Chronic obstructive pulmonary disease, unspecified COPD type (HCC)    COPD on Advair 250/50 once a day    Change Advair  250/50 to BID          Fatty liver    01/2018 abdominal ultrasound normal except for mild diffuse fatty infiltration of liver.    Lab Results   Component Value Date    ALT 26 11/01/2023    AST 19 11/01/2023    ALKPHOS 50 11/01/2023    BILITOT 0.42 02/27/2015          Gastroesophageal reflux disease without esophagitis    GERD/Kessler's stable on Omeprazole. No dysphagia. Repeat EGD 06/2023.         Medicare annual wellness visit, subsequent        Continue with current medications.  Repeat labs.    Chest x-ray PA and lateral.    Up-to-date with flu vaccine.  COVID-19 vaccine recommended.    He is scheduled to be seen by Cardiology.    He is due for an eye exam     OV 6 months               History of Present Illness       HPI-follow up OV for chronic medical problems/AWV. Medications reviewed.           Review of Systems   Constitutional:  Positive for fatigue. Negative for appetite change, chills, fever and unexpected weight change.   HENT:  Negative for congestion, ear pain, rhinorrhea, sinus pain, sore throat and trouble swallowing.         + chronic nasal congestion. He has been using PRN Coricidan    Eyes:  Negative for visual disturbance.        S/p cataract surgery OS   Respiratory:  Positive for apnea. Negative for cough, shortness of breath and wheezing.    Cardiovascular:  Negative for chest pain, palpitations and leg swelling.        See HPI 11/2023  Echo Left Ventricle: Left ventricular cavity size is normal. Wall thickness is normal. The left ventricular ejection fraction is 60%. Systolic function is normal. Wall motion is normal. Diastolic function is mildly abnormal, consistent with grade I (abnormal) relaxation  Left Atrium: The atrium is dilated. Aortic Valve: There is aortic valve sclerosis.        08/2015 admission for right-sided chest pain associated with increased shortness of breath and accelerated hypertension. He ruled out for MI. EKG no acute EKG changes. nuclear stress test normal. no  perfusion abnormalities. EF 67%.   Gastrointestinal:  Negative for abdominal pain, blood in stool, constipation, diarrhea, nausea and vomiting.        See HPI. History of colon polyps-Colonoscopy 09/2022. 02/2020 CT scan abdomen and pelvis 3.8 cm exophytic left renal intra pole cyst.  Atherosclerotic changes of abdominal aorta no aneurysm.  Enlarged prostate.  Small fat containing umbilical hernia.  Small fat containing inguinal hernias.     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  Subsequent EGD negative for recurrence                                                       Endocrine: Negative for polydipsia and polyuria.   Genitourinary:  Negative for difficulty urinating.        Lab Results       Component                Value               Date                       PSA                      1.5                 09/22/2020                 PSA                      0.7                 02/27/2015                 PSA                      0.7                 03/18/2014                      Musculoskeletal:  Positive for back pain. Negative for arthralgias and myalgias.        S/p fall 11/2023 sustaining thoracic spine vertebral fractures.  X rays thoracic spine T7 and T8 vertebral body fractures. CT head  No acute intracranial process. No skull fracture. Chronic microangiopathy. CTA Acute fracture of anterior paravertebral ossification at the level of T7-8 with contiguous fracture extension into the T8 vertebral body. No significant displacement, vertebral body height loss or retropulsion. Trace bilateral pleural effusions with minimal adjacent subsegmental atelectasis. No aortic dissection or aneurysm. No aortic or major branch vessel occlusion or significant stenosis. Incidental thyroid nodule(s).       Skin:  Negative for rash.        S/p excision BCC L ear    Allergic/Immunologic: Negative for environmental allergies.   Neurological:  Negative for dizziness,  weakness and headaches.   Hematological:  Negative for adenopathy. Does not bruise/bleed easily.        07/2023 CBC microcytic anemia  No rectal bleeding or melena.     GERD on Omeprazole 40 mg twice daily. EGD 6/2023 gastritis/gastric polyps.  Hiatal hernia 3 cm  9/2022 colonoscopy normal except for diverticulosis. on oral iron      Lab Results       Component                Value               Date                       WBC                      6.34                01/11/2024                 HGB                      12.6                01/11/2024                 HCT                      38.6                01/11/2024                 MCV                      85                  01/11/2024                 PLT                      171                 01/11/2024                    Lab Results       Component                Value               Date                       IRON                     69                  01/11/2024                 TIBC                     380                 01/11/2024                 FERRITIN                 23 (L)              01/11/2024                          Lab Results       Component                Value               Date                       [id number]               477                 07/13/2023            Lab Results       Component                Value               Date                       FOLATE                   >22.3               07/13/2023                 Lab Results       Component                Value               Date                       RETICCTPCT               1.88 (H)            07/13/2023            Lab Results       Component                Value               Date                       SPEP                no monoclonal bands         07/13/2023                    Hemoglobin       Date                     Value               Ref Range           Status                07/06/2023               11.1 (L)            12.0 - 17.0 g/*     Final                 12/27/2022                12.7                12.0 - 17.0 g/*     Final                 2022               13.0                12.0 - 17.0 g/*     Final                 2015               15.3                12.0 - 17.0 g/*     Final                 2015               16.4                12.0 - 17.0 g/*     Final                 2015               15.3                12.0 - 17.0 g/*     Final                 Psychiatric/Behavioral:  Negative for dysphoric mood and sleep disturbance.      Past Medical History:   Diagnosis Date    Accelerated essential hypertension     LAST ASSESSED 8/27/15    Arthritis 01\01\11    Benign positional vertigo 2016    Cancer (HCC) Skin    Cataract, left eye 10/24/2017    Closed T8 spinal fracture (HCC)     COPD (chronic obstructive pulmonary disease) (HCC)     Diabetes mellitus (HCC)     Diverticulosis     GERD (gastroesophageal reflux disease)     Hypertension     Lumbar radiculopathy 2016    Polyp of colon 2017    Overview:  Added automatically from request for surgery [id number]    Prostatitis     LAST ASSESSED 3/17/15    Visual impairment \\20     Past Surgical History:   Procedure Laterality Date    CARDIOVASCULAR STRESS TEST      EYE SURGERY       Family History   Problem Relation Age of Onset    Emphysema Father         LUNG    Stroke Father     COPD Father     Hearing loss Father     Coronary artery disease Father     Hypertension Maternal Grandfather     Diabetes Maternal Grandmother     Hearing loss Paternal Grandfather     Drug abuse Brother      Social History     Tobacco Use    Smoking status: Former     Current packs/day: 0.00     Average packs/day: 2.0 packs/day for 30.0 years (60.0 ttl pk-yrs)     Types: Cigarettes     Start date:      Quit date: 2012     Years since quittin.7    Smokeless tobacco: Former     Types: Chew     Quit date: 2000   Vaping Use    Vaping status: Never Used   Substance and Sexual Activity    Alcohol  use: No    Drug use: No    Sexual activity: Not Currently     Current Outpatient Medications on File Prior to Visit   Medication Sig    amLODIPine (NORVASC) 10 mg tablet Take 1 tablet (10 mg total) by mouth daily    aspirin (ECOTRIN LOW STRENGTH) 81 mg EC tablet Take 81 mg by mouth daily    atorvastatin (LIPITOR) 20 mg tablet Take 1 tablet (20 mg total) by mouth daily    carvedilol (COREG) 25 mg tablet Take 1 tablet (25 mg total) by mouth 2 (two) times a day    chlorthalidone 25 mg tablet Take 1.5 tablets (37.5 mg total) by mouth daily    Cholecalciferol (VITAMIN D3) 1000 UNIT/SPRAY LIQD Take by mouth    Cyanocobalamin (VITAMIN B-12) 1000 MCG/15ML LIQD Take 1 tablet by mouth daily    Fluticasone-Salmeterol (Advair Diskus) 250-50 mcg/dose inhaler Inhale 1 puff 2 (two) times a day    Folic Acid 20 MG CAPS Take 1 capsule by mouth daily    gabapentin (NEURONTIN) 100 mg capsule Take 1 capsule (100 mg total) by mouth 3 (three) times a day    metFORMIN (GLUCOPHAGE) 850 mg tablet Take 1 tablet (850 mg total) by mouth 2 (two) times a day with meals    Omega-3 Fatty Acids (FISH OIL) 1,000 mg Take 1 capsule by mouth daily    omeprazole (PriLOSEC) 40 MG capsule Take 1 capsule by mouth 2 (two) times a day    polyethylene glycol-propylene glycol (SYSTANE) 0.4-0.3 % Apply to eye    Pyridoxine HCl (vitamin B-6) 25 MG tablet Take 25 mg by mouth daily    triamcinolone (KENALOG) 0.1 % cream Apply topically 2 (two) times a day    valsartan (DIOVAN) 320 MG tablet Take 1 tablet (320 mg total) by mouth daily     No Known Allergies  Immunization History   Administered Date(s) Administered    COVID-19 PFIZER VACCINE 0.3 ML IM 02/19/2021, 03/11/2021, 10/19/2021    COVID-19 Pfizer Vac BIVALENT Roger-sucrose 12 Yr+ IM 10/08/2022    COVID-19 Pfizer mRNA vacc PF roger-sucrose 12 yr and older (Comirnaty) 11/12/2023    COVID-19 Pfizer vac (Roger-sucrose, gray cap) 12 yr+ IM 05/05/2022    H1N1, All Formulations 02/09/2010    INFLUENZA 11/05/2019     "Influenza Split High Dose Preservative Free IM 09/23/2013, 10/01/2014, 11/15/2016, 10/24/2017, 11/05/2019    Influenza, high dose seasonal 0.7 mL 11/19/2018, 11/05/2019, 08/24/2020, 09/22/2021, 11/03/2023    Influenza, seasonal, injectable 09/06/2011, 09/11/2012    Pneumococcal Conjugate 13-Valent 10/12/2015    Pneumococcal Conjugate Vaccine 20-valent (Pcv20), Polysace 11/30/2023    Pneumococcal Polysaccharide PPV23 09/06/2011, 11/15/2016    Respiratory Syncytial Virus Vaccine (Recombinant, Adjuvanted) 02/10/2024    Zoster Vaccine Recombinant 02/18/2024     Objective     /68 (BP Location: Left arm, Patient Position: Sitting, Cuff Size: Large)   Pulse 76   Temp 97.9 °F (36.6 °C) (Temporal)   Resp 18   Ht 5' 8\" (1.727 m)   Wt 109 kg (240 lb 8 oz)   SpO2 98%   BMI 36.57 kg/m²     Wt Readings from Last 3 Encounters:   10/08/24 109 kg (240 lb 8 oz)   04/30/24 109 kg (240 lb 8 oz)   02/15/24 111 kg (244 lb)         Physical Exam  Constitutional:       General: He is not in acute distress.  HENT:      Right Ear: Tympanic membrane and ear canal normal.      Left Ear: Tympanic membrane and ear canal normal.      Mouth/Throat:      Mouth: No oral lesions.      Pharynx: Oropharynx is clear.   Eyes:      General: No scleral icterus.     Extraocular Movements: Extraocular movements intact.      Conjunctiva/sclera: Conjunctivae normal.      Pupils: Pupils are equal, round, and reactive to light.   Neck:      Thyroid: No thyroid mass or thyromegaly.      Vascular: Normal carotid pulses. No carotid bruit or JVD.   Cardiovascular:      Rate and Rhythm: Normal rate and regular rhythm.      Heart sounds: No murmur heard.     No gallop.   Pulmonary:      Effort: Pulmonary effort is normal.      Breath sounds: Normal breath sounds. No wheezing or rales.   Chest:      Chest wall: No tenderness.   Musculoskeletal:      Right lower leg: No edema.      Left lower leg: No edema.   Lymphadenopathy:      Cervical: No cervical " adenopathy.      Upper Body:      Right upper body: No supraclavicular adenopathy.      Left upper body: No supraclavicular adenopathy.   Skin:     Coloration: Skin is not cyanotic.      Findings: No rash.      Nails: There is no clubbing.   Neurological:      General: No focal deficit present.      Mental Status: He is alert and oriented to person, place, and time.   Psychiatric:         Mood and Affect: Mood normal.         Cognition and Memory: Cognition normal.

## 2024-10-09 ENCOUNTER — APPOINTMENT (OUTPATIENT)
Dept: LAB | Facility: MEDICAL CENTER | Age: 79
End: 2024-10-09
Payer: COMMERCIAL

## 2024-10-09 ENCOUNTER — APPOINTMENT (OUTPATIENT)
Dept: RADIOLOGY | Facility: MEDICAL CENTER | Age: 79
End: 2024-10-09
Payer: COMMERCIAL

## 2024-10-09 DIAGNOSIS — E78.2 MIXED HYPERLIPIDEMIA: ICD-10-CM

## 2024-10-09 DIAGNOSIS — R80.9 TYPE 2 DIABETES MELLITUS WITH MICROALBUMINURIA, WITHOUT LONG-TERM CURRENT USE OF INSULIN (HCC): ICD-10-CM

## 2024-10-09 DIAGNOSIS — E11.29 TYPE 2 DIABETES MELLITUS WITH MICROALBUMINURIA, WITHOUT LONG-TERM CURRENT USE OF INSULIN (HCC): ICD-10-CM

## 2024-10-09 DIAGNOSIS — I10 PRIMARY HYPERTENSION: ICD-10-CM

## 2024-10-09 DIAGNOSIS — R07.89 LEFT-SIDED CHEST WALL PAIN: ICD-10-CM

## 2024-10-09 LAB
ALBUMIN SERPL BCG-MCNC: 4.5 G/DL (ref 3.5–5)
ALP SERPL-CCNC: 55 U/L (ref 34–104)
ALT SERPL W P-5'-P-CCNC: 30 U/L (ref 7–52)
ANION GAP SERPL CALCULATED.3IONS-SCNC: 12 MMOL/L (ref 4–13)
AST SERPL W P-5'-P-CCNC: 25 U/L (ref 13–39)
BILIRUB SERPL-MCNC: 0.41 MG/DL (ref 0.2–1)
BUN SERPL-MCNC: 30 MG/DL (ref 5–25)
CALCIUM SERPL-MCNC: 10.1 MG/DL (ref 8.4–10.2)
CHLORIDE SERPL-SCNC: 102 MMOL/L (ref 96–108)
CHOLEST SERPL-MCNC: 120 MG/DL
CO2 SERPL-SCNC: 29 MMOL/L (ref 21–32)
CREAT SERPL-MCNC: 1.11 MG/DL (ref 0.6–1.3)
CREAT UR-MCNC: 68.1 MG/DL
ERYTHROCYTE [DISTWIDTH] IN BLOOD BY AUTOMATED COUNT: 14.7 % (ref 11.6–15.1)
EST. AVERAGE GLUCOSE BLD GHB EST-MCNC: 154 MG/DL
GFR SERPL CREATININE-BSD FRML MDRD: 63 ML/MIN/1.73SQ M
GLUCOSE P FAST SERPL-MCNC: 158 MG/DL (ref 65–99)
HBA1C MFR BLD: 7 %
HCT VFR BLD AUTO: 41.2 % (ref 36.5–49.3)
HDLC SERPL-MCNC: 31 MG/DL
HGB BLD-MCNC: 13.1 G/DL (ref 12–17)
LDLC SERPL CALC-MCNC: 43 MG/DL (ref 0–100)
MCH RBC QN AUTO: 28.3 PG (ref 26.8–34.3)
MCHC RBC AUTO-ENTMCNC: 31.8 G/DL (ref 31.4–37.4)
MCV RBC AUTO: 89 FL (ref 82–98)
MICROALBUMIN UR-MCNC: 443.8 MG/L
MICROALBUMIN/CREAT 24H UR: 652 MG/G CREATININE (ref 0–30)
PLATELET # BLD AUTO: 177 THOUSANDS/UL (ref 149–390)
PMV BLD AUTO: 11.9 FL (ref 8.9–12.7)
POTASSIUM SERPL-SCNC: 4.8 MMOL/L (ref 3.5–5.3)
PROT SERPL-MCNC: 7.4 G/DL (ref 6.4–8.4)
RBC # BLD AUTO: 4.63 MILLION/UL (ref 3.88–5.62)
SODIUM SERPL-SCNC: 143 MMOL/L (ref 135–147)
TRIGL SERPL-MCNC: 228 MG/DL
TSH SERPL DL<=0.05 MIU/L-ACNC: 2.5 UIU/ML (ref 0.45–4.5)
WBC # BLD AUTO: 7.92 THOUSAND/UL (ref 4.31–10.16)

## 2024-10-09 PROCEDURE — 80053 COMPREHEN METABOLIC PANEL: CPT

## 2024-10-09 PROCEDURE — 83036 HEMOGLOBIN GLYCOSYLATED A1C: CPT

## 2024-10-09 PROCEDURE — 82570 ASSAY OF URINE CREATININE: CPT

## 2024-10-09 PROCEDURE — 71046 X-RAY EXAM CHEST 2 VIEWS: CPT

## 2024-10-09 PROCEDURE — 84443 ASSAY THYROID STIM HORMONE: CPT

## 2024-10-09 PROCEDURE — 80061 LIPID PANEL: CPT

## 2024-10-09 PROCEDURE — 82043 UR ALBUMIN QUANTITATIVE: CPT

## 2024-10-09 PROCEDURE — 85027 COMPLETE CBC AUTOMATED: CPT

## 2024-10-09 PROCEDURE — 36415 COLL VENOUS BLD VENIPUNCTURE: CPT

## 2024-10-15 DIAGNOSIS — I10 ESSENTIAL HYPERTENSION: ICD-10-CM

## 2024-10-15 RX ORDER — CHLORTHALIDONE 25 MG/1
37.5 TABLET ORAL DAILY
Qty: 135 TABLET | Refills: 1 | Status: SHIPPED | OUTPATIENT
Start: 2024-10-15

## 2024-10-15 NOTE — TELEPHONE ENCOUNTER
Reason for call:   [x] Refill   [] Prior Auth  [] Other:     Office:   [x] PCP/Provider - London Mata    [] Specialty/Provider -     Medication:     chlorthalidone 25 mg tablet       Dose/Frequency:     Take 1.5 tablets (37.5 mg total) by mouth daily       Quantity: 135    Pharmacy: [facility] - [address] [phone]     Does the patient have enough for 3 days?   [] Yes   [x] No - Send as HP to POD

## 2024-10-24 ENCOUNTER — OFFICE VISIT (OUTPATIENT)
Dept: CARDIOLOGY CLINIC | Facility: MEDICAL CENTER | Age: 79
End: 2024-10-24
Payer: COMMERCIAL

## 2024-10-24 VITALS
HEIGHT: 68 IN | WEIGHT: 239 LBS | HEART RATE: 62 BPM | OXYGEN SATURATION: 96 % | BODY MASS INDEX: 36.22 KG/M2 | DIASTOLIC BLOOD PRESSURE: 70 MMHG | SYSTOLIC BLOOD PRESSURE: 138 MMHG

## 2024-10-24 DIAGNOSIS — J43.8 OTHER EMPHYSEMA (HCC): ICD-10-CM

## 2024-10-24 DIAGNOSIS — E11.29 TYPE 2 DIABETES MELLITUS WITH MICROALBUMINURIA, WITHOUT LONG-TERM CURRENT USE OF INSULIN (HCC): ICD-10-CM

## 2024-10-24 DIAGNOSIS — I10 PRIMARY HYPERTENSION: ICD-10-CM

## 2024-10-24 DIAGNOSIS — I73.9 PAD (PERIPHERAL ARTERY DISEASE) (HCC): ICD-10-CM

## 2024-10-24 DIAGNOSIS — R94.31 ABNORMAL ECG: ICD-10-CM

## 2024-10-24 DIAGNOSIS — E78.2 MIXED HYPERLIPIDEMIA: Primary | ICD-10-CM

## 2024-10-24 DIAGNOSIS — R80.9 TYPE 2 DIABETES MELLITUS WITH MICROALBUMINURIA, WITHOUT LONG-TERM CURRENT USE OF INSULIN (HCC): ICD-10-CM

## 2024-10-24 PROCEDURE — 93000 ELECTROCARDIOGRAM COMPLETE: CPT | Performed by: INTERNAL MEDICINE

## 2024-10-24 PROCEDURE — 99203 OFFICE O/P NEW LOW 30 MIN: CPT | Performed by: INTERNAL MEDICINE

## 2024-10-24 NOTE — PROGRESS NOTES
Cardiology Follow Up    Poncho Villalobos Jr.  1945  [id number]  [facility] [place]  [address]  [place] [address] [phone]    1. Mixed hyperlipidemia  2. Other emphysema (HCC)  3. Primary hypertension  4. PAD (peripheral artery disease) ([facility])  5. Type 2 diabetes mellitus with microalbuminuria, without long-term current use of insulin ([facility])      Discussion: Mr. Villalobos's symptoms do not suggest a cardiac etiology.  There is no exertional component, the symptoms last up to hours, and are associated with a jabbing sensation.  In addition, symptoms are worse with deep inspiration.  A musculoskeletal etiology is most likely.  However, the baseline ECG is abnormal, with a very long first-degree AV block and left anterior fascicular block, and indeterminant BBB.  I would like to follow this over time.  I asked him to return for a follow-up visit in 3 months for a repeat ECG.    Cardiovascular History:   Mr. Villalobos has no prior history of heart disease. He has a several year history of non-cardiac chest discomfort, located in the left axilla and shoulder, often lasting for hours, often associated with a jabbing sensation, and consistently worse with deep inspiration.  There is no exertional component.  He does not have symptoms suggestive of heart failure, although his activity is limited by back discomfort and neuropathy, as well as significant morbid obesity.  He does have risk factors, including diabetes, hypertension, and dyslipidemia.  Lipid panel in 10/24 disclosed total cholesterol 120, LDL 43, HDL 31, and triglycerides 228.  ECG at the time of his initial cardiac visit did disclose a markedly prolonged first-degree AV block of approximately 400 ms, with left anterior fascicular block indeterminant BBB, and delayed transition.  An echocardiogram in 11/23 showed an estimated ejection fraction of 60% with grade 1 diastolic  dysfunction, left atrial enlargement, and aortic valve sclerosis without stenosis.  SPECT perfusion imaging in the distant past (4/15) was normal.  There was no history of syncope or near-syncope.    Patient Active Problem List   Diagnosis    Mixed hyperlipidemia    Hypertension    FIOR (obstructive sleep apnea)    Kessler's esophagus without dysplasia    Gastroesophageal reflux disease    Fatty liver    Eczema    Degenerative joint disease of hand    PAD (peripheral artery disease) (HCC)    Chronic obstructive pulmonary disease, unspecified (HCC)    Diabetic neuropathy associated with type 2 diabetes mellitus (HCC)    Type 2 diabetes mellitus with microalbuminuria, without long-term current use of insulin (HCC)    Diaphragmatic hernia    Mucous retention cyst of salivary gland    Obesity, morbid (HCC)     Past Medical History:   Diagnosis Date    Accelerated essential hypertension     LAST ASSESSED 8/27/15    Arthritis \\11    Benign positional vertigo 2016    Cancer (HCC) Skin    Cataract, left eye 10/24/2017    Closed T8 spinal fracture (HCC)     COPD (chronic obstructive pulmonary disease) (HCC)     Diabetes mellitus (HCC)     Diverticulosis     GERD (gastroesophageal reflux disease)     Hypertension     Lumbar radiculopathy 2016    Polyp of colon 2017    Overview:  Added automatically from request for surgery [id number]    Prostatitis     LAST ASSESSED 3/17/15    Visual impairment 01\\20     Social History     Socioeconomic History    Marital status: /Civil Union     Spouse name: Not on file    Number of children: Not on file    Years of education: Not on file    Highest education level: Not on file   Occupational History    Not on file   Tobacco Use    Smoking status: Former     Current packs/day: 0.00     Average packs/day: 2.0 packs/day for 30.0 years (60.0 ttl pk-yrs)     Types: Cigarettes     Start date:      Quit date: 2012     Years since quittin.8    Smokeless  tobacco: Former     Types: Chew     Quit date: 1/1/2000   Vaping Use    Vaping status: Never Used   Substance and Sexual Activity    Alcohol use: No    Drug use: No    Sexual activity: Not Currently   Other Topics Concern    Not on file   Social History Narrative    Not on file     Social Determinants of Health     Financial Resource Strain: Low Risk  (7/6/2023)    Overall Financial Resource Strain (CARDIA)     Difficulty of Paying Living Expenses: Not very hard   Food Insecurity: No Food Insecurity (10/8/2024)    Nursing - Inadequate Food Risk Classification     Worried About Running Out of Food in the Last Year: Never true     Ran Out of Food in the Last Year: Never true     Ran Out of Food in the Last Year: Not on file   Transportation Needs: No Transportation Needs (10/8/2024)    PRAPARE - Transportation     Lack of Transportation (Medical): No     Lack of Transportation (Non-Medical): No   Physical Activity: Not on file   Stress: Not on file   Social Connections: Not on file   Intimate Partner Violence: Not on file   Housing Stability: Low Risk  (10/8/2024)    Housing Stability Vital Sign     Unable to Pay for Housing in the Last Year: No     Number of Times Moved in the Last Year: 1     Homeless in the Last Year: No      Family History   Problem Relation Age of Onset    Emphysema Father         LUNG    Stroke Father     COPD Father     Hearing loss Father     Coronary artery disease Father     Hypertension Maternal Grandfather     Diabetes Maternal Grandmother     Hearing loss Paternal Grandfather     Drug abuse Brother      Past Surgical History:   Procedure Laterality Date    CARDIOVASCULAR STRESS TEST      EYE SURGERY         Current Outpatient Medications:     amLODIPine (NORVASC) 10 mg tablet, Take 1 tablet (10 mg total) by mouth daily, Disp: 90 tablet, Rfl: 1    aspirin (ECOTRIN LOW STRENGTH) 81 mg EC tablet, Take 81 mg by mouth daily, Disp: , Rfl:     atorvastatin (LIPITOR) 20 mg tablet, Take 1 tablet  (20 mg total) by mouth daily, Disp: 90 tablet, Rfl: 1    carvedilol (COREG) 25 mg tablet, Take 1 tablet (25 mg total) by mouth 2 (two) times a day, Disp: 180 tablet, Rfl: 1    chlorthalidone 25 mg tablet, Take 1.5 tablets (37.5 mg total) by mouth daily, Disp: 135 tablet, Rfl: 1    Cholecalciferol (VITAMIN D3) 1000 UNIT/SPRAY LIQD, Take by mouth, Disp: , Rfl:     Cyanocobalamin (VITAMIN B-12) 1000 MCG/15ML LIQD, Take 1 tablet by mouth daily, Disp: , Rfl:     Fluticasone-Salmeterol (Advair Diskus) 250-50 mcg/dose inhaler, Inhale 1 puff 2 (two) times a day, Disp: 180 blister, Rfl: 1    Folic Acid 20 MG CAPS, Take 1 capsule by mouth daily, Disp: , Rfl:     gabapentin (NEURONTIN) 100 mg capsule, Take 1 capsule (100 mg total) by mouth 3 (three) times a day, Disp: 90 capsule, Rfl: 5    metFORMIN (GLUCOPHAGE) 850 mg tablet, Take 1 tablet (850 mg total) by mouth 2 (two) times a day with meals, Disp: 200 tablet, Rfl: 3    Omega-3 Fatty Acids (FISH OIL) 1,000 mg, Take 1 capsule by mouth daily, Disp: , Rfl:     omeprazole (PriLOSEC) 40 MG capsule, Take 1 capsule by mouth 2 (two) times a day, Disp: , Rfl:     polyethylene glycol-propylene glycol (SYSTANE) 0.4-0.3 %, Apply to eye, Disp: , Rfl:     Pyridoxine HCl (vitamin B-6) 25 MG tablet, Take 25 mg by mouth daily, Disp: , Rfl:     triamcinolone (KENALOG) 0.1 % cream, Apply topically 2 (two) times a day, Disp: 80 g, Rfl: 3    valsartan (DIOVAN) 320 MG tablet, Take 1 tablet (320 mg total) by mouth daily, Disp: 90 tablet, Rfl: 1  No Known Allergies    Labs: personally reviewed all pertinent labs  Imaging:  personally reviewed all pertinent imaging  Cath:  ECHO:  Stress:  Holter:    Review of Systems:  Review of Systems   Constitutional: Negative.   HENT: Negative.     Eyes: Negative.    Cardiovascular: Negative.    Respiratory: Negative.     Endocrine: Negative.    Hematologic/Lymphatic: Negative.    Skin: Negative.    Musculoskeletal: Negative.    Gastrointestinal: Negative.     Genitourinary: Negative.    Neurological: Negative.    Psychiatric/Behavioral: Negative.     Allergic/Immunologic: Negative.    All other systems reviewed and are negative.      There were no vitals filed for this visit.  Weight (last 2 days)       None            Physical Exam:  Physical Exam  Vitals reviewed.   Constitutional:       General: He is not in acute distress.     Appearance: He is well-developed. He is not diaphoretic.   HENT:      Head: Normocephalic and atraumatic.   Eyes:      General: No scleral icterus.     Conjunctiva/sclera: Conjunctivae normal.   Neck:      Vascular: No JVD.      Trachea: No tracheal deviation.   Cardiovascular:      Rate and Rhythm: Normal rate and regular rhythm.      Pulses: Intact distal pulses.      Heart sounds: Normal heart sounds. No murmur heard.     No friction rub. No gallop.   Pulmonary:      Effort: Pulmonary effort is normal. No respiratory distress.      Breath sounds: Normal breath sounds. No stridor. No wheezing or rales.   Chest:      Chest wall: No tenderness.   Abdominal:      General: Bowel sounds are normal. There is no distension.      Palpations: Abdomen is soft.      Tenderness: There is no abdominal tenderness.   Musculoskeletal:         General: No tenderness. Normal range of motion.      Cervical back: Normal range of motion and neck supple.   Skin:     General: Skin is warm and dry.      Findings: No erythema.   Neurological:      Mental Status: He is alert and oriented to person, place, and time.      Cranial Nerves: No cranial nerve deficit.      Coordination: Coordination normal.   Psychiatric:         Behavior: Behavior normal.         Thought Content: Thought content normal.         Judgment: Judgment normal.         Walt Farris MD

## 2024-12-26 DIAGNOSIS — E78.2 MIXED HYPERLIPIDEMIA: ICD-10-CM

## 2024-12-26 DIAGNOSIS — I10 ESSENTIAL HYPERTENSION: ICD-10-CM

## 2024-12-26 RX ORDER — CARVEDILOL 25 MG/1
25 TABLET ORAL 2 TIMES DAILY
Qty: 180 TABLET | Refills: 1 | Status: SHIPPED | OUTPATIENT
Start: 2024-12-26 | End: 2024-12-27 | Stop reason: SDUPTHER

## 2024-12-26 RX ORDER — ATORVASTATIN CALCIUM 20 MG/1
20 TABLET, FILM COATED ORAL DAILY
Qty: 90 TABLET | Refills: 1 | Status: SHIPPED | OUTPATIENT
Start: 2024-12-26 | End: 2024-12-27 | Stop reason: SDUPTHER

## 2024-12-26 NOTE — TELEPHONE ENCOUNTER
Patient requesting multiple  refills on new prescription being sent     Reason for call:   [x] Refill   [] Prior Auth  [] Other:     Office:   [x] PCP/Provider - London Mata  [] Specialty/Provider -     Medication: Atorvastatin   Dose/Frequency: 20 mg Daily   Quantity: 90    Medication: Carvedilol  Dose/Frequency: 25 mg BID  Quantity: 180      Pharmacy: [facility] [place] [facility]    Does the patient have enough for 3 days?   [x] Yes   [] No - Send as HP to POD

## 2024-12-27 DIAGNOSIS — E78.2 MIXED HYPERLIPIDEMIA: ICD-10-CM

## 2024-12-27 DIAGNOSIS — I10 ESSENTIAL HYPERTENSION: ICD-10-CM

## 2024-12-27 RX ORDER — ATORVASTATIN CALCIUM 20 MG/1
20 TABLET, FILM COATED ORAL DAILY
Qty: 90 TABLET | Refills: 1 | Status: SHIPPED | OUTPATIENT
Start: 2024-12-27

## 2024-12-27 RX ORDER — CARVEDILOL 25 MG/1
25 TABLET ORAL 2 TIMES DAILY
Qty: 180 TABLET | Refills: 1 | Status: SHIPPED | OUTPATIENT
Start: 2024-12-27

## 2024-12-27 NOTE — TELEPHONE ENCOUNTER
Reason for call:   [x] Refill   [] Prior Auth  [x] Other: Not a duplicate. Pharmacy change     Office:   [x] PCP/Provider - GENA OLSON   [] Specialty/Provider -     atorvastatin (LIPITOR) 20 mg tablet    20 mg, Daily   90     carvedilol (COREG) 25 mg tablet   25 mg, 2 times daily   180      Pharmacy: [facility]    Does the patient have enough for 3 days?   [x] Yes   [] No - Send as HP to POD

## 2025-01-30 DIAGNOSIS — I10 ESSENTIAL HYPERTENSION: ICD-10-CM

## 2025-01-30 RX ORDER — AMLODIPINE BESYLATE 10 MG/1
10 TABLET ORAL DAILY
Qty: 90 TABLET | Refills: 1 | Status: SHIPPED | OUTPATIENT
Start: 2025-01-30

## 2025-01-30 NOTE — TELEPHONE ENCOUNTER
Reason for call:   [x] Refill   [] Prior Auth  [] Other:     Office:   [x] PCP/Provider -   [] Specialty/Provider -     Medication: amLODIPine (NORVASC) 10 mg Take 1 tablet (10 mg total) by mouth daily     Pharmacy: [facility]     Does the patient have enough for 3 days?   [x] Yes   [] No - Send as HP to POD

## 2025-03-12 DIAGNOSIS — I10 ESSENTIAL HYPERTENSION: ICD-10-CM

## 2025-03-12 NOTE — TELEPHONE ENCOUNTER
Reason for call:   [x] Refill   [] Prior Auth  [] Other:     Office:   [x] PCP/Provider -   [] Specialty/Provider -     Medication: Take 1 tablet (320 mg total) by mouth daily,     Dose/Frequency: Take 1 tablet (320 mg total) by mouth daily,     Quantity: 90 tabler    Pharmacy: [facility] [address] [place] [address] Pharmacy   Does the patient have enough for 3 days?   [x] Yes   [] No - Send as HP to POD

## 2025-03-13 RX ORDER — VALSARTAN 320 MG/1
320 TABLET ORAL DAILY
Qty: 90 TABLET | Refills: 1 | Status: SHIPPED | OUTPATIENT
Start: 2025-03-13

## 2025-03-13 NOTE — TELEPHONE ENCOUNTER
Patient called rx refill line inquiring status on Valsartan. Informed request remains pending, allowing 24-72 hours to receive a response. Patient asking for a rush to be placed.

## 2025-04-01 ENCOUNTER — RA CDI HCC (OUTPATIENT)
Dept: OTHER | Facility: HOSPITAL | Age: 80
End: 2025-04-01

## 2025-04-01 NOTE — PROGRESS NOTES
HCC coding opportunities          Chart Reviewed number of suggestions sent to Provider: 1     Patients Insurance     Medicare Insurance: [employer]          E11.51: Type 2 diabetes mellitus with diabetic peripheral angiopathy without gangrene (HCC) [555322]     DM & PVD are presumed to have a causal-effect relationship unless documented as unrelated

## 2025-04-15 DIAGNOSIS — I10 ESSENTIAL HYPERTENSION: ICD-10-CM

## 2025-04-15 RX ORDER — CHLORTHALIDONE 25 MG/1
37.5 TABLET ORAL DAILY
Qty: 135 TABLET | Refills: 0 | Status: SHIPPED | OUTPATIENT
Start: 2025-04-15 | End: 2025-04-21 | Stop reason: SDUPTHER

## 2025-04-15 NOTE — TELEPHONE ENCOUNTER
Reason for call:   [x] Refill   [] Prior Auth  [] Other:     Office:   [x] PCP/Provider -   [] Specialty/Provider -     chlorthalidone 25 mg tablet 37.5 mg, Oral, Daily       Quantity: 90    [facility]    Local [facility]acy   Does the patient have enough for 3 days?   [] Yes   [] No - Send as HP to POD    Mail Away Pharmacy   Does the patient have enough for 10 days?   [] Yes   [x] No - Send as HP to POD

## 2025-04-16 ENCOUNTER — TELEPHONE (OUTPATIENT)
Age: 80
End: 2025-04-16

## 2025-04-16 DIAGNOSIS — R26.9 GAIT DISTURBANCE: ICD-10-CM

## 2025-04-16 DIAGNOSIS — E78.2 MIXED HYPERLIPIDEMIA: ICD-10-CM

## 2025-04-16 DIAGNOSIS — E11.42 DIABETIC POLYNEUROPATHY ASSOCIATED WITH TYPE 2 DIABETES MELLITUS (HCC): ICD-10-CM

## 2025-04-16 DIAGNOSIS — R80.9 TYPE 2 DIABETES MELLITUS WITH MICROALBUMINURIA, WITHOUT LONG-TERM CURRENT USE OF INSULIN (HCC): Primary | ICD-10-CM

## 2025-04-16 DIAGNOSIS — E11.29 TYPE 2 DIABETES MELLITUS WITH MICROALBUMINURIA, WITHOUT LONG-TERM CURRENT USE OF INSULIN (HCC): Primary | ICD-10-CM

## 2025-04-16 DIAGNOSIS — I10 ESSENTIAL HYPERTENSION: ICD-10-CM

## 2025-04-16 NOTE — TELEPHONE ENCOUNTER
Spoke to patient on 4/16/25 and let him know both labs and urine test were ordered and should be done prior to next week's appointment.

## 2025-04-16 NOTE — TELEPHONE ENCOUNTER
Patient called in to inquire if any labs were to be done prior to OV 4/21. No labs ordered at this time. No Further Action required.

## 2025-04-17 ENCOUNTER — APPOINTMENT (OUTPATIENT)
Dept: LAB | Facility: MEDICAL CENTER | Age: 80
End: 2025-04-17
Attending: FAMILY MEDICINE
Payer: COMMERCIAL

## 2025-04-17 DIAGNOSIS — R26.9 GAIT DISTURBANCE: ICD-10-CM

## 2025-04-17 DIAGNOSIS — R80.9 TYPE 2 DIABETES MELLITUS WITH MICROALBUMINURIA, WITHOUT LONG-TERM CURRENT USE OF INSULIN (HCC): ICD-10-CM

## 2025-04-17 DIAGNOSIS — E11.29 TYPE 2 DIABETES MELLITUS WITH MICROALBUMINURIA, WITHOUT LONG-TERM CURRENT USE OF INSULIN (HCC): ICD-10-CM

## 2025-04-17 LAB
ALBUMIN SERPL BCG-MCNC: 4.5 G/DL (ref 3.5–5)
ALP SERPL-CCNC: 54 U/L (ref 34–104)
ALT SERPL W P-5'-P-CCNC: 28 U/L (ref 7–52)
ANION GAP SERPL CALCULATED.3IONS-SCNC: 11 MMOL/L (ref 4–13)
AST SERPL W P-5'-P-CCNC: 25 U/L (ref 13–39)
BASOPHILS # BLD AUTO: 0.09 THOUSANDS/ÂΜL (ref 0–0.1)
BASOPHILS NFR BLD AUTO: 1 % (ref 0–1)
BILIRUB SERPL-MCNC: 0.53 MG/DL (ref 0.2–1)
BUN SERPL-MCNC: 27 MG/DL (ref 5–25)
CALCIUM SERPL-MCNC: 9.9 MG/DL (ref 8.4–10.2)
CHLORIDE SERPL-SCNC: 106 MMOL/L (ref 96–108)
CHOLEST SERPL-MCNC: 119 MG/DL (ref ?–200)
CO2 SERPL-SCNC: 26 MMOL/L (ref 21–32)
CREAT SERPL-MCNC: 1.13 MG/DL (ref 0.6–1.3)
CREAT UR-MCNC: 97.3 MG/DL
EOSINOPHIL # BLD AUTO: 0.51 THOUSAND/ÂΜL (ref 0–0.61)
EOSINOPHIL NFR BLD AUTO: 6 % (ref 0–6)
ERYTHROCYTE [DISTWIDTH] IN BLOOD BY AUTOMATED COUNT: 14.5 % (ref 11.6–15.1)
EST. AVERAGE GLUCOSE BLD GHB EST-MCNC: 157 MG/DL
GFR SERPL CREATININE-BSD FRML MDRD: 61 ML/MIN/1.73SQ M
GLUCOSE P FAST SERPL-MCNC: 142 MG/DL (ref 65–99)
HBA1C MFR BLD: 7.1 %
HCT VFR BLD AUTO: 40.4 % (ref 36.5–49.3)
HDLC SERPL-MCNC: 34 MG/DL
HGB BLD-MCNC: 12.9 G/DL (ref 12–17)
IMM GRANULOCYTES # BLD AUTO: 0.04 THOUSAND/UL (ref 0–0.2)
IMM GRANULOCYTES NFR BLD AUTO: 1 % (ref 0–2)
LDLC SERPL CALC-MCNC: 42 MG/DL (ref 0–100)
LYMPHOCYTES # BLD AUTO: 1.96 THOUSANDS/ÂΜL (ref 0.6–4.47)
LYMPHOCYTES NFR BLD AUTO: 23 % (ref 14–44)
MCH RBC QN AUTO: 28 PG (ref 26.8–34.3)
MCHC RBC AUTO-ENTMCNC: 31.9 G/DL (ref 31.4–37.4)
MCV RBC AUTO: 88 FL (ref 82–98)
MICROALBUMIN UR-MCNC: 260.5 MG/L
MICROALBUMIN/CREAT 24H UR: 268 MG/G CREATININE (ref 0–30)
MONOCYTES # BLD AUTO: 0.43 THOUSAND/ÂΜL (ref 0.17–1.22)
MONOCYTES NFR BLD AUTO: 5 % (ref 4–12)
NEUTROPHILS # BLD AUTO: 5.42 THOUSANDS/ÂΜL (ref 1.85–7.62)
NEUTS SEG NFR BLD AUTO: 64 % (ref 43–75)
NONHDLC SERPL-MCNC: 85 MG/DL
NRBC BLD AUTO-RTO: 0 /100 WBCS
PLATELET # BLD AUTO: 189 THOUSANDS/UL (ref 149–390)
PMV BLD AUTO: 11.9 FL (ref 8.9–12.7)
POTASSIUM SERPL-SCNC: 3.9 MMOL/L (ref 3.5–5.3)
PROT SERPL-MCNC: 7.5 G/DL (ref 6.4–8.4)
RBC # BLD AUTO: 4.61 MILLION/UL (ref 3.88–5.62)
SODIUM SERPL-SCNC: 143 MMOL/L (ref 135–147)
TRIGL SERPL-MCNC: 214 MG/DL (ref ?–150)
VIT B12 SERPL-MCNC: 3688 PG/ML (ref 180–914)
WBC # BLD AUTO: 8.45 THOUSAND/UL (ref 4.31–10.16)

## 2025-04-17 PROCEDURE — 85025 COMPLETE CBC W/AUTO DIFF WBC: CPT | Performed by: FAMILY MEDICINE

## 2025-04-17 PROCEDURE — 80061 LIPID PANEL: CPT | Performed by: FAMILY MEDICINE

## 2025-04-17 PROCEDURE — 83036 HEMOGLOBIN GLYCOSYLATED A1C: CPT | Performed by: FAMILY MEDICINE

## 2025-04-17 PROCEDURE — 36415 COLL VENOUS BLD VENIPUNCTURE: CPT | Performed by: FAMILY MEDICINE

## 2025-04-17 PROCEDURE — 82607 VITAMIN B-12: CPT

## 2025-04-17 PROCEDURE — 80053 COMPREHEN METABOLIC PANEL: CPT | Performed by: FAMILY MEDICINE

## 2025-04-17 PROCEDURE — 82043 UR ALBUMIN QUANTITATIVE: CPT

## 2025-04-17 PROCEDURE — 82570 ASSAY OF URINE CREATININE: CPT

## 2025-04-19 NOTE — ASSESSMENT & PLAN NOTE
FIOR/CPAP usage. FIOR on CPAP with auto adjustment feature. He is currently benefiting from his current use of CPAP with restorative sleep and no excessive daytime tiredness

## 2025-04-19 NOTE — ASSESSMENT & PLAN NOTE
On Atorvastatin 20 mg/day-side effects from 40 mg dose- and 2 fish oil capsules a day    02/2020 CT scan abdomen and pelvis ordered by his surgeon for recurrent abdominal pain.  Incidental finding significant triple-vessel coronary artery calcification    Lab Results   Component Value Date    CHOLESTEROL 119 04/17/2025    CHOLESTEROL 120 10/09/2024    CHOLESTEROL 110 08/31/2023     Lab Results   Component Value Date    HDL 34 (L) 04/17/2025    HDL 31 (L) 10/09/2024    HDL 32 (L) 08/31/2023     Lab Results   Component Value Date    TRIG 214 (H) 04/17/2025    TRIG 228 (H) 10/09/2024    TRIG 180 (H) 08/31/2023     Lab Results   Component Value Date    LDLCALC 42 04/17/2025        Watch diet.     Orders:    Lipid Panel with Direct LDL reflex; Future

## 2025-04-19 NOTE — ASSESSMENT & PLAN NOTE
Type 2 DM on Metformin 850 mg BID. Side effects on Metformin 1,000 mg BID. 04/2025 A1c 7.1. Urine albumin 266 decreased from 443.     No hypoglycemia. + DPN improved on vitamin supplement. No longer on Gabapentin    Last eye exam > 1 year ago     OV 6 months with labs  Due for an eye exam         Lab Results   Component Value Date    HGBA1C 7.1 (H) 04/17/2025       Orders:    Hemoglobin A1C; Future

## 2025-04-19 NOTE — ASSESSMENT & PLAN NOTE
FIB-4 score 1.95     01/2018 abdominal ultrasound normal except for mild diffuse fatty infiltration of liver.    Lab Results   Component Value Date    ALT 28 04/17/2025    AST 25 04/17/2025    ALKPHOS 54 04/17/2025    BILITOT 0.42 02/27/2015

## 2025-04-19 NOTE — PROGRESS NOTES
Name: Poncho Villalobos Jr.      : 1945      MRN: [id number]  Encounter Provider: London Mata MD  Encounter Date: 2025   Encounter department: [facility]  :  Assessment & Plan  Type 2 diabetes mellitus with microalbuminuria, without long-term current use of insulin (HCC)    Type 2 DM on Metformin 850 mg BID. Side effects on Metformin 1,000 mg BID. 2025 A1c 7.1. Urine albumin 266 decreased from 443.     No hypoglycemia. + DPN improved on vitamin supplement. No longer on Gabapentin    Last eye exam > 1 year ago     OV 6 months with labs  Due for an eye exam         Lab Results   Component Value Date    HGBA1C 7.1 (H) 2025       Orders:    Hemoglobin A1C; Future      Primary hypertension    Blood pressures have been stable on current multi drug regimen-Valsartan 320 mg/day, Amlodipine 10 mg/day, Carvedilol 25 mg BID and Chlorthalidone 37.5 mg/day     Prior Nephrology evaluation and workup for secondary causes of hypertension- metanephrine normal at 47. Nor metanephrine elevated at 183. Renin 19.27. aldosterone 2.2. Renal artery dopplers 2015 no ISIAH. kidneys hypertrophic. 2.8 cm cyst left kidney.      BP Readings from Last 3 Encounters:   25 130/70   10/24/24 138/70   10/08/24 132/68     Lab Results   Component Value Date     10/06/2015    SODIUM 143 2025    K 3.9 2025     2025    CO2 26 2025    ANIONGAP 9 10/06/2015    AGAP 11 2025    BUN 27 (H) 2025    CREATININE 1.13 2025    GLUC 215 (H) 2023    GLUF 142 (H) 2025    CALCIUM 9.9 2025    AST 25 2025    ALT 28 2025    ALKPHOS 54 2025    PROT 7.9 2015    TP 7.5 2025    BILITOT 0.42 2015    TBILI 0.53 2025    EGFR 61 2025     Lab Results   Component Value Date    WBC 8.45 2025    HGB 12.9 2025    HCT 40.4 2025    MCV 88 2025     2025         Orders:    chlorthalidone 25 mg  tablet; Take 1.5 tablets (37.5 mg total) by mouth daily    Comprehensive metabolic panel; Future    CBC and differential; Future      Mixed hyperlipidemia    On Atorvastatin 20 mg/day-side effects from 40 mg dose- and 2 fish oil capsules a day    02/2020 CT scan abdomen and pelvis ordered by his surgeon for recurrent abdominal pain.  Incidental finding significant triple-vessel coronary artery calcification    Lab Results   Component Value Date    CHOLESTEROL 119 04/17/2025    CHOLESTEROL 120 10/09/2024    CHOLESTEROL 110 08/31/2023     Lab Results   Component Value Date    HDL 34 (L) 04/17/2025    HDL 31 (L) 10/09/2024    HDL 32 (L) 08/31/2023     Lab Results   Component Value Date    TRIG 214 (H) 04/17/2025    TRIG 228 (H) 10/09/2024    TRIG 180 (H) 08/31/2023     Lab Results   Component Value Date    LDLCALC 42 04/17/2025        Watch diet.     Orders:    Lipid Panel with Direct LDL reflex; Future      Diabetic polyneuropathy associated with type 2 diabetes mellitus (HCC)    Lab Results   Component Value Date    HGBA1C 7.1 (H) 04/17/2025            PAD (peripheral artery disease) (HCC)    PAD with intermittent leg claudications.  Pain left hip and calf pain after walking long distances. No rest pain.      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      11/2016 arterial dopplers LEs.Diffuse heterogeneous plaque throughout both legs. Evidence of right lower extremity occlusive disease in mid SFA. aortoiliac study normal but limited views of proximal common iliac arteries due to body habitus            Chronic obstructive pulmonary disease, unspecified COPD type (HCC)    COPD on Advair 250/50 once a day             FIOR (obstructive sleep apnea)    FIOR/CPAP usage. FIOR on CPAP with auto adjustment feature. He is currently benefiting from his current use of CPAP with restorative sleep and no excessive daytime tiredness            Gastroesophageal reflux disease without  esophagitis    GERD stable on Omeprazole 40 mg BID. Last EGD 06/2023 gastritis/gastric polyps. HH 3 CM              Fatty liver    FIB-4 score 1.95     01/2018 abdominal ultrasound normal except for mild diffuse fatty infiltration of liver.    Lab Results   Component Value Date    ALT 28 04/17/2025    AST 25 04/17/2025    ALKPHOS 54 04/17/2025    BILITOT 0.42 02/27/2015            Obesity, morbid (HCC)    BMI Readings from Last 1 Encounters:   04/21/25 36.19 kg/m²                     History of Present Illness     CC follow up OV for chronic medical problems       Review of Systems   Constitutional:  Positive for fatigue. Negative for appetite change, chills, fever and unexpected weight change.   HENT:  Positive for congestion. Negative for ear pain, rhinorrhea, sinus pain, sore throat and trouble swallowing.         + chronic nasal congestion. He has been using PRN Flonase     Eyes:  Negative for visual disturbance.        S/p cataract surgery OS   Respiratory:  Positive for apnea. Negative for cough, shortness of breath and wheezing.    Cardiovascular:  Negative for chest pain, palpitations and leg swelling.        See HPI 11/2023  Echo Left Ventricle: Left ventricular cavity size is normal. Wall thickness is normal. The left ventricular ejection fraction is 60%. Systolic function is normal. Wall motion is normal. Diastolic function is mildly abnormal, consistent with grade I (abnormal) relaxation  Left Atrium: The atrium is dilated. Aortic Valve: There is aortic valve sclerosis.        08/2015 admission for right-sided chest pain associated with increased shortness of breath and accelerated hypertension. He ruled out for MI. EKG no acute EKG changes. nuclear stress test normal. no perfusion abnormalities. EF 67%.   Gastrointestinal:  Negative for abdominal pain, blood in stool, constipation, diarrhea, nausea and vomiting.        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  Subsequent EGD negative for recurrence                                                       Endocrine: Negative for polydipsia and polyuria.   Genitourinary:  Negative for difficulty urinating.        Lab Results       Component                Value               Date                       PSA                      1.5                 09/22/2020                 PSA                      0.7                 02/27/2015                 PSA                      0.7                 03/18/2014                      Musculoskeletal:  Positive for back pain. Negative for arthralgias and myalgias.        S/p fall 11/2023 sustaining thoracic spine vertebral fractures.  X rays thoracic spine T7 and T8 vertebral body fractures. CT head  No acute intracranial process. No skull fracture. Chronic microangiopathy. CTA Acute fracture of anterior paravertebral ossification at the level of T7-8 with contiguous fracture extension into the T8 vertebral body. No significant displacement, vertebral body height loss or retropulsion. Trace bilateral pleural effusions with minimal adjacent subsegmental atelectasis. No aortic dissection or aneurysm. No aortic or major branch vessel occlusion or significant stenosis. Incidental thyroid nodule(s).       Skin:  Negative for rash.        S/p excision BCC L ear    Allergic/Immunologic: Negative for environmental allergies.   Neurological:  Negative for dizziness, weakness and headaches.   Hematological:  Negative for adenopathy. Does not bruise/bleed easily.        Lab Results       Component                Value               Date                       WBC                      8.45                04/17/2025                 HGB                      12.9                04/17/2025                 HCT                      40.4                04/17/2025                 MCV                      88                  04/17/2025                 PLT                      189                  04/17/2025       Lab Results       Component                Value               Date                       [id number]               3,688 (H)           04/17/2025                                        Psychiatric/Behavioral:  Negative for dysphoric mood and sleep disturbance.        Objective   /70 (BP Location: Left arm, Patient Position: Sitting, Cuff Size: Large)   Pulse 55   Temp (!) 97.1 °F (36.2 °C) (Temporal)   Resp 16   Wt 108 kg (238 lb)   SpO2 95%   BMI 36.19 kg/m²      Wt Readings from Last 3 Encounters:   04/21/25 108 kg (238 lb)   10/24/24 108 kg (239 lb)   10/08/24 109 kg (240 lb 8 oz)         Physical Exam  Constitutional:       General: He is not in acute distress.  HENT:      Right Ear: Tympanic membrane and ear canal normal.      Left Ear: Tympanic membrane and ear canal normal.      Mouth/Throat:      Mouth: No oral lesions.      Pharynx: Oropharynx is clear.   Eyes:      General: No scleral icterus.     Extraocular Movements: Extraocular movements intact.      Conjunctiva/sclera: Conjunctivae normal.      Pupils: Pupils are equal, round, and reactive to light.   Neck:      Thyroid: No thyroid mass or thyromegaly.      Vascular: Normal carotid pulses. No carotid bruit or JVD.   Cardiovascular:      Rate and Rhythm: Normal rate and regular rhythm.      Heart sounds: No murmur heard.     No gallop.   Pulmonary:      Effort: Pulmonary effort is normal.      Breath sounds: Normal breath sounds. No wheezing or rales.   Musculoskeletal:      Right lower leg: No edema.      Left lower leg: No edema.   Lymphadenopathy:      Cervical: No cervical adenopathy.      Upper Body:      Right upper body: No supraclavicular adenopathy.      Left upper body: No supraclavicular adenopathy.   Skin:     Coloration: Skin is not cyanotic.      Findings: No rash.      Nails: There is no clubbing.   Neurological:      General: No focal deficit present.      Mental Status: He is alert and oriented to person,  place, and time.   Psychiatric:         Mood and Affect: Mood normal.

## 2025-04-19 NOTE — ASSESSMENT & PLAN NOTE
Blood pressures have been stable on current multi drug regimen-Valsartan 320 mg/day, Amlodipine 10 mg/day, Carvedilol 25 mg BID and Chlorthalidone 37.5 mg/day     Prior Nephrology evaluation and workup for secondary causes of hypertension- metanephrine normal at 47. Nor metanephrine elevated at 183. Renin 19.27. aldosterone 2.2. Renal artery dopplers 03/2015 no ISIAH. kidneys hypertrophic. 2.8 cm cyst left kidney.      BP Readings from Last 3 Encounters:   04/21/25 130/70   10/24/24 138/70   10/08/24 132/68     Lab Results   Component Value Date     10/06/2015    SODIUM 143 04/17/2025    K 3.9 04/17/2025     04/17/2025    CO2 26 04/17/2025    ANIONGAP 9 10/06/2015    AGAP 11 04/17/2025    BUN 27 (H) 04/17/2025    CREATININE 1.13 04/17/2025    GLUC 215 (H) 11/01/2023    GLUF 142 (H) 04/17/2025    CALCIUM 9.9 04/17/2025    AST 25 04/17/2025    ALT 28 04/17/2025    ALKPHOS 54 04/17/2025    PROT 7.9 02/27/2015    TP 7.5 04/17/2025    BILITOT 0.42 02/27/2015    TBILI 0.53 04/17/2025    EGFR 61 04/17/2025     Lab Results   Component Value Date    WBC 8.45 04/17/2025    HGB 12.9 04/17/2025    HCT 40.4 04/17/2025    MCV 88 04/17/2025     04/17/2025         Orders:    chlorthalidone 25 mg tablet; Take 1.5 tablets (37.5 mg total) by mouth daily    Comprehensive metabolic panel; Future    CBC and differential; Future

## 2025-04-21 ENCOUNTER — OFFICE VISIT (OUTPATIENT)
Dept: FAMILY MEDICINE CLINIC | Facility: CLINIC | Age: 80
End: 2025-04-21
Payer: COMMERCIAL

## 2025-04-21 VITALS
HEART RATE: 55 BPM | SYSTOLIC BLOOD PRESSURE: 130 MMHG | WEIGHT: 238 LBS | OXYGEN SATURATION: 95 % | TEMPERATURE: 97.1 F | RESPIRATION RATE: 16 BRPM | BODY MASS INDEX: 36.19 KG/M2 | DIASTOLIC BLOOD PRESSURE: 70 MMHG

## 2025-04-21 DIAGNOSIS — E11.42 DIABETIC POLYNEUROPATHY ASSOCIATED WITH TYPE 2 DIABETES MELLITUS (HCC): ICD-10-CM

## 2025-04-21 DIAGNOSIS — I73.9 PAD (PERIPHERAL ARTERY DISEASE) (HCC): ICD-10-CM

## 2025-04-21 DIAGNOSIS — K21.9 GASTROESOPHAGEAL REFLUX DISEASE WITHOUT ESOPHAGITIS: ICD-10-CM

## 2025-04-21 DIAGNOSIS — K76.0 FATTY LIVER: ICD-10-CM

## 2025-04-21 DIAGNOSIS — R80.9 TYPE 2 DIABETES MELLITUS WITH MICROALBUMINURIA, WITHOUT LONG-TERM CURRENT USE OF INSULIN (HCC): Primary | ICD-10-CM

## 2025-04-21 DIAGNOSIS — J44.9 CHRONIC OBSTRUCTIVE PULMONARY DISEASE, UNSPECIFIED COPD TYPE (HCC): ICD-10-CM

## 2025-04-21 DIAGNOSIS — E66.01 OBESITY, MORBID (HCC): ICD-10-CM

## 2025-04-21 DIAGNOSIS — G47.33 OSA (OBSTRUCTIVE SLEEP APNEA): ICD-10-CM

## 2025-04-21 DIAGNOSIS — E11.29 TYPE 2 DIABETES MELLITUS WITH MICROALBUMINURIA, WITHOUT LONG-TERM CURRENT USE OF INSULIN (HCC): Primary | ICD-10-CM

## 2025-04-21 DIAGNOSIS — E78.2 MIXED HYPERLIPIDEMIA: ICD-10-CM

## 2025-04-21 DIAGNOSIS — I10 PRIMARY HYPERTENSION: ICD-10-CM

## 2025-04-21 PROCEDURE — G2211 COMPLEX E/M VISIT ADD ON: HCPCS | Performed by: FAMILY MEDICINE

## 2025-04-21 PROCEDURE — 99214 OFFICE O/P EST MOD 30 MIN: CPT | Performed by: FAMILY MEDICINE

## 2025-04-21 RX ORDER — CHLORTHALIDONE 25 MG/1
37.5 TABLET ORAL DAILY
Qty: 135 TABLET | Refills: 3 | Status: SHIPPED | OUTPATIENT
Start: 2025-04-21

## 2025-04-21 NOTE — ASSESSMENT & PLAN NOTE
PAD with intermittent leg claudications.  Pain left hip and calf pain after walking long distances. No rest pain.      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      11/2016 arterial dopplers LEs.Diffuse heterogeneous plaque throughout both legs. Evidence of right lower extremity occlusive disease in mid SFA. aortoiliac study normal but limited views of proximal common iliac arteries due to body habitus

## 2025-04-21 NOTE — ASSESSMENT & PLAN NOTE
GERD stable on Omeprazole 40 mg BID. Last EGD 06/2023 gastritis/gastric polyps. HH 3 CM

## 2025-06-17 ENCOUNTER — APPOINTMENT (EMERGENCY)
Dept: RADIOLOGY | Facility: HOSPITAL | Age: 80
End: 2025-06-17
Payer: COMMERCIAL

## 2025-06-17 ENCOUNTER — HOSPITAL ENCOUNTER (EMERGENCY)
Facility: HOSPITAL | Age: 80
Discharge: HOME | End: 2025-06-17
Attending: STUDENT IN AN ORGANIZED HEALTH CARE EDUCATION/TRAINING PROGRAM | Admitting: STUDENT IN AN ORGANIZED HEALTH CARE EDUCATION/TRAINING PROGRAM
Payer: COMMERCIAL

## 2025-06-17 VITALS
SYSTOLIC BLOOD PRESSURE: 174 MMHG | RESPIRATION RATE: 16 BRPM | DIASTOLIC BLOOD PRESSURE: 81 MMHG | HEART RATE: 72 BPM | OXYGEN SATURATION: 99 %

## 2025-06-17 DIAGNOSIS — M54.42 ACUTE LEFT-SIDED LOW BACK PAIN WITH LEFT-SIDED SCIATICA: Primary | ICD-10-CM

## 2025-06-17 PROCEDURE — 96372 THER/PROPH/DIAG INJ SC/IM: CPT

## 2025-06-17 PROCEDURE — 73502 X-RAY EXAM HIP UNI 2-3 VIEWS: CPT

## 2025-06-17 PROCEDURE — 99283 EMERGENCY DEPT VISIT LOW MDM: CPT

## 2025-06-17 PROCEDURE — 99284 EMERGENCY DEPT VISIT MOD MDM: CPT | Performed by: STUDENT IN AN ORGANIZED HEALTH CARE EDUCATION/TRAINING PROGRAM

## 2025-06-17 RX ORDER — KETOROLAC TROMETHAMINE 30 MG/ML
15 INJECTION, SOLUTION INTRAMUSCULAR; INTRAVENOUS ONCE
Status: COMPLETED | OUTPATIENT
Start: 2025-06-17 | End: 2025-06-17

## 2025-06-17 RX ORDER — ACETAMINOPHEN 325 MG/1
975 TABLET ORAL ONCE
Status: COMPLETED | OUTPATIENT
Start: 2025-06-17 | End: 2025-06-17

## 2025-06-17 RX ORDER — LIDOCAINE 50 MG/G
1 PATCH TOPICAL ONCE
Status: DISCONTINUED | OUTPATIENT
Start: 2025-06-17 | End: 2025-06-17 | Stop reason: HOSPADM

## 2025-06-17 RX ADMIN — LIDOCAINE 1 PATCH: 700 PATCH TOPICAL at 10:54

## 2025-06-17 RX ADMIN — KETOROLAC TROMETHAMINE 15 MG: 30 INJECTION, SOLUTION INTRAMUSCULAR; INTRAVENOUS at 10:54

## 2025-06-17 RX ADMIN — ACETAMINOPHEN 975 MG: 325 TABLET ORAL at 10:54

## 2025-06-17 NOTE — ED PROVIDER NOTES
Time reflects when diagnosis was documented in both MDM as applicable and the Disposition within this note       Time User Action Codes Description Comment    6/17/2025 10:48 AM Donna Desire Add [M54.42] Acute left-sided low back pain with left-sided sciatica           ED Disposition       ED Disposition   Discharge    Condition   Stable    Date/Time   Tue Jun 17, 2025 10:48 AM    Comment   Poncho Villalobos Jr. discharge to home/self care.                   Assessment & Plan       Medical Decision Making  80-year-old male with history of PAD, COPD, FIOR, diabetes, hypertension, lumbar fractures from remote trauma who presents with left-sided back and left leg pain that is worsened with sitting and improved with walking without associated symptoms.    No red flag symptoms for back pain, specifically no fevers/chills, saddle anesthesia, urinary/bowel incontinence, numbness/tingling/weakness, hx of trauma, active malignancy, or IVDU.    Vitals with hypertension and bradycardia but otherwise stable and afebrile.  No midline spinal TTP/stepoffs/deformities. 5/5 strength with SILT throughout bilateral lower extremities.  2+ femoral and DP/PT pulses with SILT in all dermatomes and 5/5 strength with hip flexion/extension, knee flexion/extension, and dorsi/plantarflexion bilaterally.  Mild tenderness to palpation over left hip without deformity or skin changes.      Differential diagnosis includes but is not limited to: musculoskeletal strain, sciatica, less likely bursitis, doubt fracture, limb ischemia given reassuring physical exam and lack of claudication symptoms or preceding trauma    Workup and treatment as below:    Imaging: See ED course  EKG: N/A  Labs: N/A  Meds: analgesia  Consults: N/A  Reassessment: Patient tolerated p.o. and ambulatory challenges in the emergency department and had stable vitals at time of discharge.    Dispo: Patient was discharged to home with strict return precautions and referral to  comprehensive spine program. Patient acknowledged understanding of plan and diagnostic results, and all their questions were answered to their satisfaction.       Amount and/or Complexity of Data Reviewed  Radiology: ordered.    Risk  OTC drugs.  Prescription drug management.        ED Course as of 06/17/25 1058   Tue Jun 17, 2025   1047 Plain films of the hip without acute bony abnormality.       Medications   lidocaine (LIDODERM) 5 % patch 1 patch (1 patch Topical Medication Applied 6/17/25 1054)   acetaminophen (TYLENOL) tablet 975 mg (975 mg Oral Given 6/17/25 1054)   ketorolac (TORADOL) injection 15 mg (15 mg Intramuscular Given 6/17/25 1054)       ED Risk Strat Scores                    No data recorded                            History of Present Illness       Chief Complaint   Patient presents with    Leg Pain     Intermittent L leg pain for approx 3wks. Often starts at the hip and radiates down leg. Pain sometimes only occurs around calf.        Past Medical History[1]   Past Surgical History[2]   Family History[3]   Social History[4]   E-Cigarette/Vaping    E-Cigarette Use Never User       E-Cigarette/Vaping Substances    Nicotine No     THC No     CBD No     Flavoring No     Other No     Unknown No       I have reviewed and agree with the history as documented.     80-year-old male with history of PAD, COPD, FIOR, diabetes, hypertension, lumbar fractures from remote trauma who presents with left-sided back and left leg pain that is worsened with sitting and improved with walking without associated symptoms.    No red flag symptoms for back pain, specifically no fevers/chills, saddle anesthesia, urinary/bowel incontinence, numbness/tingling/weakness, hx of trauma, active malignancy, or IVDU.      Leg Pain  Associated symptoms: back pain    Associated symptoms: no fever        Review of Systems   Constitutional:  Negative for chills and fever.   HENT:  Negative for ear pain and sore throat.    Eyes:   Negative for pain and visual disturbance.   Respiratory:  Negative for cough and shortness of breath.    Cardiovascular:  Negative for chest pain, palpitations and leg swelling.   Gastrointestinal:  Negative for abdominal pain, blood in stool, constipation, diarrhea, nausea and vomiting.   Genitourinary:  Negative for dysuria and hematuria.   Musculoskeletal:  Positive for arthralgias and back pain. Negative for gait problem.   Skin:  Negative for color change and rash.   Neurological:  Negative for dizziness, seizures, syncope, facial asymmetry, speech difficulty, weakness, light-headedness, numbness and headaches.   All other systems reviewed and are negative.          Objective       ED Triage Vitals [06/17/25 1004]   Temp Pulse Blood Pressure Respirations SpO2 Patient Position - Orthostatic VS   -- (!) 52 (!) 222/97 20 99 % Sitting      Temp src Heart Rate Source BP Location FiO2 (%) Pain Score    -- Monitor Right arm -- --      Vitals      Date and Time Temp Pulse SpO2 Resp BP Pain Score FACES Pain Rating User   06/17/25 1004 -- 52 99 % 20 222/97 -- -- KM            Physical Exam  Vitals and nursing note reviewed.   Constitutional:       General: He is not in acute distress.     Appearance: Normal appearance. He is not ill-appearing or toxic-appearing.   HENT:      Head: Normocephalic.      Nose: Nose normal.      Mouth/Throat:      Mouth: Mucous membranes are moist.      Pharynx: Oropharynx is clear.   Pulmonary:      Effort: Pulmonary effort is normal.   Abdominal:      General: There is distension.      Palpations: Abdomen is soft.     Musculoskeletal:      Comments:  No midline spinal TTP/stepoffs/deformities. 5/5 strength with SILT throughout bilateral lower extremities.  2+ femoral and DP/PT pulses with SILT in all dermatomes and 5/5 strength with hip flexion/extension, knee flexion/extension, and dorsi/plantarflexion bilaterally.  Mild tenderness to palpation over left hip without deformity or skin  changes.       Neurological:      Mental Status: He is alert and oriented to person, place, and time.     Psychiatric:         Mood and Affect: Mood normal.         Behavior: Behavior normal.         Results Reviewed       None            XR hip/pelv 2-3 vws left if performed    (Results Pending)       Procedures    ED Medication and Procedure Management   Prior to Admission Medications   Prescriptions Last Dose Informant Patient Reported? Taking?   Cholecalciferol (VITAMIN D3) 1000 UNIT/SPRAY LIQD  Self Yes No   Sig: Take by mouth   Cyanocobalamin (VITAMIN B-12) 1000 MCG/15ML LIQD  Self Yes No   Sig: Take 1 tablet by mouth daily   Fluticasone-Salmeterol (Advair Diskus) 250-50 mcg/dose inhaler  Self No No   Sig: Inhale 1 puff 2 (two) times a day   Folic Acid 20 MG CAPS  Self Yes No   Sig: Take 1 capsule by mouth daily   Omega-3 Fatty Acids (FISH OIL) 1,000 mg  Self Yes No   Sig: Take 1 capsule by mouth daily   Pyridoxine HCl (vitamin B-6) 25 MG tablet  Self Yes No   Sig: Take 25 mg by mouth daily   amLODIPine (NORVASC) 10 mg tablet   No No   Sig: Take 1 tablet (10 mg total) by mouth daily   aspirin (ECOTRIN LOW STRENGTH) 81 mg EC tablet  Self Yes No   Sig: Take 81 mg by mouth daily   atorvastatin (LIPITOR) 20 mg tablet   No No   Sig: Take 1 tablet (20 mg total) by mouth daily   carvedilol (COREG) 25 mg tablet   No No   Sig: Take 1 tablet (25 mg total) by mouth 2 (two) times a day   chlorthalidone 25 mg tablet   No No   Sig: Take 1.5 tablets (37.5 mg total) by mouth daily   metFORMIN (GLUCOPHAGE) 850 mg tablet  Self No No   Sig: Take 1 tablet (850 mg total) by mouth 2 (two) times a day with meals   omeprazole (PriLOSEC) 40 MG capsule  Self Yes No   Sig: Take 1 capsule by mouth 2 (two) times a day   polyethylene glycol-propylene glycol (SYSTANE) 0.4-0.3 %  Self Yes No   Sig: Apply to eye   triamcinolone (KENALOG) 0.1 % cream  Self No No   Sig: Apply topically 2 (two) times a day   valsartan (DIOVAN) 320 MG tablet   No  No   Sig: Take 1 tablet (320 mg total) by mouth daily      Facility-Administered Medications: None     Patient's Medications   Discharge Prescriptions    No medications on file       ED SEPSIS DOCUMENTATION   Time reflects when diagnosis was documented in both MDM as applicable and the Disposition within this note       Time User Action Codes Description Comment    6/17/2025 10:48 AM Desire Thompson [M54.42] Acute left-sided low back pain with left-sided sciatica                    [1]   Past Medical History:  Diagnosis Date    Accelerated essential hypertension     LAST ASSESSED 8/27/15    Aneurysm (HCC)     Arthritis 01\01\11    Benign positional vertigo 08/05/2016    Cancer (HCC) Skin    Cataract, left eye 10/24/2017    Closed T8 spinal fracture ([facility]) 11/01/2023    COPD (chronic obstructive pulmonary disease) (HCC)     not sure    Diabetes mellitus (HCC)     Diverticulosis     GERD (gastroesophageal reflux disease)     Hypertension     Lumbar radiculopathy 12/07/2016    Polyp of colon 06/05/2017    Overview:  Added automatically from request for surgery [id number]    Prostatitis     LAST ASSESSED 3/17/15    Thoracic disc disorder 10/29/23    Visual impairment 01\01\20   [2]   Past Surgical History:  Procedure Laterality Date    CARDIOVASCULAR STRESS TEST      EYE SURGERY     [3]   Family History  Problem Relation Name Age of Onset    Emphysema Father Poncho         LUNG    Stroke Father Poncho     COPD Father Poncho     Hearing loss Father Poncho     Coronary artery disease Father Poncho     Drug abuse Brother Brian     Diabetes Maternal Grandmother P     Heart attack Maternal Grandfather Deondre     Hypertension Maternal Grandfather Deondre     Heart attack Paternal Grandfather Deondre Wilfredo     Hearing loss Paternal Grandfather Deondre Villalobos    [4]   Social History  Tobacco Use    Smoking status: Former     Current packs/day: 0.00     Average packs/day: 2.0 packs/day for 30.0 years (60.0 ttl pk-yrs)     Types: Cigarettes      Start date: 1970     Quit date: 2012     Years since quittin.4    Smokeless tobacco: Former     Types: Chew     Quit date: 2000   Vaping Use    Vaping status: Never Used   Substance Use Topics    Alcohol use: No    Drug use: No        Desire Thompson MD  25 [phone]

## 2025-06-17 NOTE — DISCHARGE INSTRUCTIONS
You were seen in the Emergency Department for: Low back pain    Your workup today showed: No evidence of nerve or bony injury    Your next steps should include: A referral has been made to our comprehensive spine program.  You should receive a call from them in the next 3 days to schedule an appointment.  Please also call today to make an appointment with your primary care provider in one week.    Pain management: You may take two extra strength Tylenol every 8 hours as needed for pain as well as two 200 mg ibuprofen pills every 8 hours for the first 48 hours.  Alternate these medicines so that you take 2 Tylenol pills, then wait 4 hours before taking 2 ibuprofen pills, then wait 4 hours before your next dose of Tylenol and repeat.    Reasons to RETURN IMMEDIATELY to the Emergency Department: Loss of control of bowel/bladder, new numbness/tingling/weakness of your legs, temperature > 100.4 degrees, uncontrollable nausea/vomiting, or any other concerns.

## 2025-06-18 ENCOUNTER — NURSE TRIAGE (OUTPATIENT)
Dept: PHYSICAL THERAPY | Facility: OTHER | Age: 80
End: 2025-06-18

## 2025-06-18 DIAGNOSIS — M54.42 ACUTE LEFT-SIDED LOW BACK PAIN WITH LEFT-SIDED SCIATICA: Primary | ICD-10-CM

## 2025-06-18 NOTE — TELEPHONE ENCOUNTER
Background - Initial Assessment  Clinical complaint: Left lower back pain which radiates down the left leg with numbness and tingling. States this pain has been present for about 3 weeks. NKI. States he has a history of back fracture years ago.   Date of onset: 3 weeks  Frequency of pain: intermittent  Quality of pain: aching and throbbing    Protocols used: [facility] Protocol    Red Flag and Start Back charting is currently located under Additional Charting.    Comprehensive Spine Program was reviewed in detail and what we can provide for their back pain.  Patient is agreeable to being triaged and would like to proceed with Physical Therapy.    Referral was placed for Physical Therapy at the [place] site. Patients information was sent to the  to make evaluation appointment. Patient made aware that the PT office  will be calling to schedule the appointment.  Patient was provided with the phone number to the PT office.    No further questions and/or concerns were voiced by the patient at this time. Patient states understanding of the referral that was placed.    Referral Closed.

## 2025-07-02 DIAGNOSIS — I10 ESSENTIAL HYPERTENSION: ICD-10-CM

## 2025-07-02 DIAGNOSIS — E78.2 MIXED HYPERLIPIDEMIA: ICD-10-CM

## 2025-07-02 NOTE — TELEPHONE ENCOUNTER
Reason for call:   [x] Refill   [] Prior Auth  [] Other:     Office:   [x] PCP/Provider -   [] Specialty/Provider -     Medication: Atorvastatin    Dose/Frequency: 20 mg    Quantity: 90 tablets    Pharmacy: [facility] [address] [phone]    [facility]   Does the patient have enough for 3 days?   [] Yes   [] No - Send as HP to POD    [facility]   Does the patient have enough for 10 days?   [x] Yes   [] No - Send as HP to POD          Reason for call:   [x] Refill   [] Prior Auth  [] Other:     Office:   [x] PCP/Provider -   [] Specialty/Provider -     Medication: Carvedilol    Dose/Frequency: 25 mg    Quantity: 180 tablets    Pharmacy: [facility] - [address] [phone]    [facility]   Does the patient have enough for 3 days?   [] Yes   [] No - Send as HP to POD    [facility]   Does the patient have enough for 10 days?   [x] Yes   [] No - Send as HP to POD

## 2025-07-03 RX ORDER — ATORVASTATIN CALCIUM 20 MG/1
20 TABLET, FILM COATED ORAL DAILY
Qty: 90 TABLET | Refills: 1 | Status: SHIPPED | OUTPATIENT
Start: 2025-07-03

## 2025-07-03 RX ORDER — CARVEDILOL 25 MG/1
25 TABLET ORAL 2 TIMES DAILY
Qty: 180 TABLET | Refills: 1 | Status: SHIPPED | OUTPATIENT
Start: 2025-07-03

## 2025-08-04 DIAGNOSIS — I10 ESSENTIAL HYPERTENSION: ICD-10-CM

## 2025-08-06 RX ORDER — AMLODIPINE BESYLATE 10 MG/1
10 TABLET ORAL DAILY
Qty: 90 TABLET | Refills: 1 | Status: SHIPPED | OUTPATIENT
Start: 2025-08-06